# Patient Record
Sex: MALE | Race: WHITE | Employment: OTHER | URBAN - METROPOLITAN AREA
[De-identification: names, ages, dates, MRNs, and addresses within clinical notes are randomized per-mention and may not be internally consistent; named-entity substitution may affect disease eponyms.]

---

## 2019-06-18 ENCOUNTER — OFFICE VISIT (OUTPATIENT)
Dept: ORTHOPEDIC SURGERY | Age: 69
End: 2019-06-18
Payer: MEDICARE

## 2019-06-18 VITALS — HEIGHT: 74 IN | WEIGHT: 283.07 LBS | BODY MASS INDEX: 36.33 KG/M2

## 2019-06-18 DIAGNOSIS — S93.401A MILD ANKLE SPRAIN, RIGHT, INITIAL ENCOUNTER: Primary | ICD-10-CM

## 2019-06-18 DIAGNOSIS — M25.571 ACUTE RIGHT ANKLE PAIN: ICD-10-CM

## 2019-06-18 PROCEDURE — 4004F PT TOBACCO SCREEN RCVD TLK: CPT | Performed by: PODIATRIST

## 2019-06-18 PROCEDURE — G8428 CUR MEDS NOT DOCUMENT: HCPCS | Performed by: PODIATRIST

## 2019-06-18 PROCEDURE — 4040F PNEUMOC VAC/ADMIN/RCVD: CPT | Performed by: PODIATRIST

## 2019-06-18 PROCEDURE — 99203 OFFICE O/P NEW LOW 30 MIN: CPT | Performed by: PODIATRIST

## 2019-06-18 PROCEDURE — G8417 CALC BMI ABV UP PARAM F/U: HCPCS | Performed by: PODIATRIST

## 2019-06-18 PROCEDURE — 3017F COLORECTAL CA SCREEN DOC REV: CPT | Performed by: PODIATRIST

## 2019-06-18 PROCEDURE — 1123F ACP DISCUSS/DSCN MKR DOCD: CPT | Performed by: PODIATRIST

## 2019-06-18 PROCEDURE — L1902 AFO ANKLE GAUNTLET PRE OTS: HCPCS | Performed by: PODIATRIST

## 2019-06-18 RX ORDER — DOXYCYCLINE HYCLATE 100 MG
TABLET ORAL
COMMUNITY
Start: 2013-08-26 | End: 2019-07-17

## 2019-06-18 RX ORDER — MULTIVIT-MIN/IRON/FOLIC ACID/K 18-600-40
1 CAPSULE ORAL
Status: ON HOLD | COMMUNITY
Start: 2016-06-30 | End: 2022-10-27 | Stop reason: HOSPADM

## 2019-06-18 RX ORDER — CELECOXIB 200 MG/1
200 CAPSULE ORAL
COMMUNITY
Start: 2018-01-11 | End: 2022-10-26 | Stop reason: CLARIF

## 2019-06-18 NOTE — PROGRESS NOTES
HISTORY OF PRESENT ILLNESS: This is an initial visit for a 21-year-old male with a chief complaint of right lateral ankle and foot pain. He was shopping at [employer] on Saturday night when he suddenly fell. Pain is present with weightbearing  and twisting motions of the ankle. The pain is best relieved with rest, ice, and  elevation. FAMILY HISTORY:  Documented in chart. SOCIAL HISTORY: Documented in chart. REVIEW OF SYSTEMS: NIDDM with peripheral neuropathy otherwise, the patient denies any fever, chills, or night sweats. The patient also denies developing any type of rash. The patient denies any problems with cardiovascular, pulmonary, gastrointestinal, neurologic, urologic, genitourinary, psychiatric, dermatologic, and HEENT systems. PHYSICAL EXAMINATION: The area of greatest palpable tenderness is at the  right lateral ankle, over the ATF ligament. An anterior drawer test does  not reproduce any gross instability. There is mild to moderate edema of the  lateral ankle. There is no erythema or ecchymosis noted. No open lesions or fracture blisters are present. Neurovascular  status is grossly intact to the foot and ankle. There is no pain over the deltoid  ligament. There is no pain over the Achilles tendon and this is palpated to be  intact. Thompsonâs test is negative for a complete rupture of the Achilles  tendon. The patient is able to dorsiflex and plantarflex the foot, as well as  joao and invert independently. RADIOGRAPHS: Three weightbearing x-ray views of the right ankle were evaluated. No acute fractures or dislocations are noted. The ankle joint is in  excellent alignment. ASSESSMENT: Right Lateral Ankle Sprain with ankle pain. PLAN: The patient was educated on the pathology and its treatment options. An Aircast ankle brace was dispensed for the right ankle.   The patient was instructed on how to apply it correctly and will use it full-time with a supportive shoe.    I prescribed physical therapy to rehab the ankle. The patient will return after therapy. Procedures    Aircast Air Sport Ankle Brace     Patient was prescribed an Aircast Air Sport Brace. The right ankle will require stabilization / immobilization from this semi-rigid / rigid orthosis to improve their function. The orthosis will assist in protecting the affected area, provide functional support and facilitate healing. Patient was instructed to progress ambulation weight bearing as tolerated in the device. The patient was educated and fit by a healthcare professional with expert knowledge and specialization in brace application while under the direct supervision of the treating physician. Verbal and written instructions for the use of and application of this item were provided. They were instructed to contact the office immediately should the brace result in increased pain, decreased sensation, increased swelling or worsening of the condition.

## 2019-06-25 ENCOUNTER — HOSPITAL ENCOUNTER (OUTPATIENT)
Dept: PHYSICAL THERAPY | Age: 69
Setting detail: THERAPIES SERIES
Discharge: HOME | End: 2019-06-25
Payer: MEDICARE

## 2019-06-25 PROCEDURE — G0283 ELEC STIM OTHER THAN WOUND: HCPCS | Performed by: PHYSICAL THERAPIST

## 2019-06-25 PROCEDURE — 97110 THERAPEUTIC EXERCISES: CPT | Performed by: PHYSICAL THERAPIST

## 2019-06-25 PROCEDURE — 97161 PT EVAL LOW COMPLEX 20 MIN: CPT | Performed by: PHYSICAL THERAPIST

## 2019-06-25 NOTE — FLOWSHEET NOTE
activities related to improving balance, coordination, kinesthetic sense, posture, motor skill, proprioception and motor activation to allow for proper function of core, proximal hip and LE with self care and ADLs  [] (89221) Gait Re-education- Provided training and instruction to the patient for proper LE, core and proximal hip recruitment and positioning and eccentric body weight control with ambulation re-education including up and down stairs     Home Exercise Program:    [x] (67296) Reviewed/Progressed HEP activities related to strengthening, flexibility, endurance, ROM of core, proximal hip and LE for functional self-care, mobility, lifting and ambulation/stair navigation   [] (28049)Reviewed/Progressed HEP activities related to improving balance, coordination, kinesthetic sense, posture, motor skill, proprioception of core, proximal hip and LE for self care, mobility, lifting, and ambulation/stair navigation      Manual Treatments:  PROM / STM / Oscillations-Mobs:  G-I, II, III, IV (PA's, Inf., Post.)  [] (86998) Provided manual therapy to mobilize LE, proximal hip and/or LS spine soft tissue/joints for the purpose of modulating pain, promoting relaxation,  increasing ROM, reducing/eliminating soft tissue swelling/inflammation/restriction, improving soft tissue extensibility and allowing for proper ROM for normal function with self care, mobility, lifting and ambulation. Modalities:  hivolt 10Min ankle    Charges:  Timed Code Treatment Minutes: 20   Total Treatment Minutes: 50     [x] EVAL (LOW) 16585 (typically 20 minutes face-to-face)    [x] ASIA(03793) x      [] IONTO  [] NMR (02101) x      [] VASO  [] Manual (63987) x       [] Other:  [] TA x       [] Mech Traction (02828)  [] ES(attended) (23440)      [x] ES (un) (29625):     Kathrine Martinez stated goal: normal gait     Therapist goals for Patient:   Short Term Goals: To be achieved in: 2 weeks  1.  Independent in HEP and progression per patient tolerance, in order to prevent re-injury. 2. Patient will have a decrease in pain to facilitate improvement in movement, function, and ADLs as indicated by Functional Deficits.     Long Term Goals: To be achieved in: 8 weeks  1. Disability index score of 12% or less for the LEFS to assist with reaching prior level of function. 2. Patient will demonstrate increased AROM to cooper + to allow for proper joint functioning as indicated by patients Functional Deficits. 3. Patient will demonstrate an increase in Strength to good proximal hip strength and control, within 5/5 MMT in LE to allow for proper functional mobility as indicated by patients Functional Deficits. 4. Patient will return to full functional activities without increased symptoms or restriction. 5. Normal gait comm(patient specific functional goal)    6. recip gait up/down steps 1 flight            Progression Towards Functional goals:  [] Patient is progressing as expected towards functional goals listed. [] Progression is slowed due to complexities listed. [] Progression has been slowed due to co-morbidities.   [x] Plan just implemented, too soon to assess goals progression  [] Other:     ASSESSMENT:  See eval    Treatment/Activity Tolerance:  [x] Patient tolerated treatment well [] Patient limited by fatique  [] Patient limited by pain  [] Patient limited by other medical complications  [] Other:     Prognosis: [x] Good [] Fair  [] Poor    Patient Requires Follow-up: [x] Yes  [] No    PLAN: See eval  [] Continue per plan of care [] Alter current plan (see comments)  [x] Plan of care initiated [] Hold pending MD visit [] Discharge    Electronically signed by: Tab Villarreal PT

## 2019-06-25 NOTE — PLAN OF CARE
[facility], [address] [facility], [address] [facility] [address]  Phone: [phone]   Fax:     [phone]       Physical Therapy Certification    Dear  Dr Axel Goss,    We had the pleasure of evaluating the following patient for physical therapy services at [address] [place] [address]. A summary of our findings can be found in the initial assessment below. This includes our plan of care. If you have any questions or concerns regarding these findings, please do not hesitate to contact me at the office phone number checked above. Thank you for the referral.       Physician Signature:_______________________________Date:__________________  By signing above (or electronic signature), therapists plan is approved by physician      Patient: Pattie Hernandez   : 1950   MRN: [id number]  Referring Physician: Axel Goss      Evaluation Date: 2019      Medical Diagnosis Information:   S93.401D R ankle sprain     M25.571 R ankle pain                                        Insurance information:   [facility]     Precautions/ Contra-indications: stroke 20 yrs ago no long term problems, has a balance problem he is being seen for at [address] [place] [address] Road:  [x]NO      []YES  Preferred Language for Healthcare:   [x]English       []other:    SUBJECTIVE: Patient stated complaint:  Week ago  fell while walking not sure how or why. XRAY neg for fx.  Marked time on steps    Relevant Medical History:as a kid had hurt ankle  Functional Disability Index: lefs 25%    Pain Scale: 3/10  Easing factors: rest elevation  Provocative factors: standing prolonged walking prolonged      Type: []Constant   [x]Intermittent  []Radiating []Localized []other:     Numbness/Tingling: neuropathies fr diabetes    Occupation/School:retired, volunteer sitting and with [employer]    Living Status/Prior Level of Function: Independent with ADLs and IADLs,     OBJECTIVE:     AROM LEFT RIGHT   HIP Flex     HIP Abd     HIP Ext     HIP IR     HIP ER     Knee ext     Knee Flex     Ankle PF  40   Ankle DF  Jay Jay/sol  -4 fr 0/13   Ankle In  32   Ankle Ev  3   Strength  LEFT RIGHT   HIP Flexors     HIP Abductors     HIP Ext     Hip ER     Knee EXT (quad)     Knee Flex (HS)     Ankle DF  5/5   Ankle PF     Ankle Inv  5/5   Ankle EV  4/5        Circumference  Mid apex  7 cm prox             Reflexes/Sensation:    [x]Dermatomes/Myotomes intact    [x]Reflexes equal and normal bilaterally   []Other:    Joint mobility:    []Normal    []Hypo   []Hyper    Palpation: red area over lat mall that is hyper sensitive to touch, + swelling    Functional Mobility/Transfers: independent    Posture: good    Bandages/Dressings/Incisions: na    Gait: (include devices/WB status) antalgic has a brace he wears all the time per MD    Orthopedic Special Tests:                        [x] Patient history, allergies, meds reviewed. Medical chart reviewed. See intake form. Review Of Systems (ROS):  [x]Performed Review of systems (Integumentary, CardioPulmonary, Neurological) by intake and observation. Intake form has been scanned into medical record. Patient has been instructed to contact their primary care physician regarding ROS issues if not already being addressed at this time.       Co-morbidities/Complexities (which will affect course of rehabilitation):   [x]None           Arthritic conditions   []Rheumatoid arthritis (M05.9)  []Osteoarthritis (M19.91)   Cardiovascular conditions   []Hypertension (I10)  []Hyperlipidemia (E78.5)  []Angina pectoris (I20)  []Atherosclerosis (I70)   Musculoskeletal conditions   []Disc pathology   []Congenital spine pathologies   []Prior surgical intervention  []Osteoporosis (M81.8)  []Osteopenia (M85.8)   Endocrine conditions   []Hypothyroid (E03.9)  []Hyperthyroid Gastrointestinal conditions   []Constipation (P42.49)   Metabolic [] high complexity using standardized patient assessment instrument and/or measurable assessment of functional outcome. [x] EVAL (LOW) 04908 (typically 20 minutes face-to-face)  [] EVAL (MOD) 45497 (typically 30 minutes face-to-face)  [] EVAL (HIGH) 88038 (typically 45 minutes face-to-face)  [] RE-EVAL     PLAN:   Frequency/Duration:  2-3 days per week for 8 Weeks:  Interventions:  [x]  Therapeutic exercise including: strength training, ROM, for Lower extremity and core   [x]  NMR activation and proprioception for LE, Glutes and Core   [x]  Manual therapy as indicated for LE, Hip and spine to include: Dry Needling/IASTM, STM, PROM, Gr I-IV mobilizations, manipulation. [x] Modalities as needed that may include: thermal agents, E-stim, Biofeedback, US, iontophoresis as indicated  [x] Patient education on joint protection, postural re-education, activity modification, progression of HEP. HEP instruction: (see scanned forms)    GOALS:  Patient stated goal: normal gait    Therapist goals for Patient:   Short Term Goals: To be achieved in: 2 weeks  1. Independent in HEP and progression per patient tolerance, in order to prevent re-injury. 2. Patient will have a decrease in pain to facilitate improvement in movement, function, and ADLs as indicated by Functional Deficits. Long Term Goals: To be achieved in: 8 weeks  1. Disability index score of 12% or less for the LEFS to assist with reaching prior level of function. 2. Patient will demonstrate increased AROM to cooper + to allow for proper joint functioning as indicated by patients Functional Deficits. 3. Patient will demonstrate an increase in Strength to good proximal hip strength and control, within 5/5 MMT in LE to allow for proper functional mobility as indicated by patients Functional Deficits. 4. Patient will return to full functional activities without increased symptoms or restriction. 5. Normal gait comm(patient specific functional goal)    6. recip gait up/down steps 1 flight     Electronically signed by:  Catherine Rosas, PT

## 2019-06-27 ENCOUNTER — APPOINTMENT (OUTPATIENT)
Dept: PHYSICAL THERAPY | Age: 69
End: 2019-06-27
Payer: MEDICARE

## 2019-07-01 ENCOUNTER — HOSPITAL ENCOUNTER (OUTPATIENT)
Dept: PHYSICAL THERAPY | Age: 69
Setting detail: THERAPIES SERIES
Discharge: HOME | End: 2019-07-01
Payer: MEDICARE

## 2019-07-01 PROCEDURE — G0283 ELEC STIM OTHER THAN WOUND: HCPCS | Performed by: PHYSICAL THERAPIST

## 2019-07-01 PROCEDURE — 97112 NEUROMUSCULAR REEDUCATION: CPT | Performed by: PHYSICAL THERAPIST

## 2019-07-01 PROCEDURE — 97110 THERAPEUTIC EXERCISES: CPT | Performed by: PHYSICAL THERAPIST

## 2019-07-01 NOTE — FLOWSHEET NOTE
[facility]    Physical Therapy Daily Treatment Note  Date:  2019    Patient Name:  Karl Villegas 26  :  1950  MRN: [id number]  Restrictions/Precautions:    Medical/Treatment Diagnosis Information:    S93.401D R ankle sprain           M25.571 R ankle pain        ·      Insurance/Certification information:   [facility]  Physician Information: [facility] signed (Y/N):     Date of Patient follow up with Physician:     G-Code (if applicable):      Date G-Code Applied:     lefs 25%    Progress Note: [x]  Yes  []  No  Next due by: Visit #10       Latex Allergy:  [x]NO      []YES  Preferred Language for Healthcare:   [x]English       []other:    Visit # Insurance Allowable   2 [facility]     Pain level:  3/10     SUBJECTIVE:  Feeling much better with pain and function. OBJECTIVE: See eval  Observation:   Test measurements:      RESTRICTIONS/PRECAUTIONS: none    Exercises/Interventions:     Therapeutic Ex Sets/sec Reps Notes HEP   SLR  Standing hip ABD  TB ankle 4 way    Belt stretch G/S  HR  SLB  Standing calf stretch  Ankle iso ev/inv     limegreen x30  x30  x30 ea      x30  5 min  3x30s  10x5s     Max cue.  Unable to move foot only          Table leg, siting X  X  X      X  X  X  x          Bike   5 min            Pt ed on monitoring red spots from brace to keep sores from forming 5 min  Lat mal           equipment  next                                                                    Manual Intervention                                Therapeutic Exercise and NMR EXR  [x] (79057) Provided verbal/tactile cueing for activities related to strengthening, flexibility, endurance, ROM for improvements in LE, proximal hip, and core control with self care, mobility, lifting, ambulation.  [] (39660) Provided verbal/tactile cueing for activities related to improving balance, coordination, kinesthetic sense, posture, motor skill, proprioception  to assist with LE, proximal hip, and core control in self care, mobility, lifting, ambulation and eccentric single leg control. NMR and Therapeutic Activities:    [] (70865 or 29018) Provided verbal/tactile cueing for activities related to improving balance, coordination, kinesthetic sense, posture, motor skill, proprioception and motor activation to allow for proper function of core, proximal hip and LE with self care and ADLs  [] (11599) Gait Re-education- Provided training and instruction to the patient for proper LE, core and proximal hip recruitment and positioning and eccentric body weight control with ambulation re-education including up and down stairs     Home Exercise Program:    [x] (75868) Reviewed/Progressed HEP activities related to strengthening, flexibility, endurance, ROM of core, proximal hip and LE for functional self-care, mobility, lifting and ambulation/stair navigation   [] (50095)Reviewed/Progressed HEP activities related to improving balance, coordination, kinesthetic sense, posture, motor skill, proprioception of core, proximal hip and LE for self care, mobility, lifting, and ambulation/stair navigation      Manual Treatments:  PROM / STM / Oscillations-Mobs:  G-I, II, III, IV (PA's, Inf., Post.)  [] (47733) Provided manual therapy to mobilize LE, proximal hip and/or LS spine soft tissue/joints for the purpose of modulating pain, promoting relaxation,  increasing ROM, reducing/eliminating soft tissue swelling/inflammation/restriction, improving soft tissue extensibility and allowing for proper ROM for normal function with self care, mobility, lifting and ambulation.      Modalities:  hivolt 10Min ankle    Charges:  Timed Code Treatment Minutes: 40   Total Treatment Minutes: 50     [] EVAL (LOW) 32595 (typically 20 minutes face-to-face)    [x] XH(30682) x  2   [] IONTO  [x] NMR (39456) x      [] VASO  [] Manual (88171) x       [] Other:  [] TA x       [] Mech Traction (47718)  [] ES(attended)

## 2019-07-03 ENCOUNTER — HOSPITAL ENCOUNTER (OUTPATIENT)
Dept: PHYSICAL THERAPY | Age: 69
Setting detail: THERAPIES SERIES
Discharge: HOME | End: 2019-07-03
Payer: MEDICARE

## 2019-07-03 PROCEDURE — 97110 THERAPEUTIC EXERCISES: CPT | Performed by: PHYSICAL THERAPIST

## 2019-07-03 PROCEDURE — 97112 NEUROMUSCULAR REEDUCATION: CPT | Performed by: PHYSICAL THERAPIST

## 2019-07-03 PROCEDURE — G0283 ELEC STIM OTHER THAN WOUND: HCPCS | Performed by: PHYSICAL THERAPIST

## 2019-07-09 ENCOUNTER — HOSPITAL ENCOUNTER (OUTPATIENT)
Dept: PHYSICAL THERAPY | Age: 69
Setting detail: THERAPIES SERIES
Discharge: HOME | End: 2019-07-09
Payer: MEDICARE

## 2019-07-09 PROCEDURE — 97140 MANUAL THERAPY 1/> REGIONS: CPT | Performed by: PHYSICAL THERAPIST

## 2019-07-09 PROCEDURE — G0283 ELEC STIM OTHER THAN WOUND: HCPCS | Performed by: PHYSICAL THERAPIST

## 2019-07-09 PROCEDURE — 97110 THERAPEUTIC EXERCISES: CPT | Performed by: PHYSICAL THERAPIST

## 2019-07-09 NOTE — FLOWSHEET NOTE
kinesthetic sense, posture, motor skill, proprioception  to assist with LE, proximal hip, and core control in self care, mobility, lifting, ambulation and eccentric single leg control. NMR and Therapeutic Activities:    [] (41474 or 79252) Provided verbal/tactile cueing for activities related to improving balance, coordination, kinesthetic sense, posture, motor skill, proprioception and motor activation to allow for proper function of core, proximal hip and LE with self care and ADLs  [] (09106) Gait Re-education- Provided training and instruction to the patient for proper LE, core and proximal hip recruitment and positioning and eccentric body weight control with ambulation re-education including up and down stairs     Home Exercise Program:    [x] (86330) Reviewed/Progressed HEP activities related to strengthening, flexibility, endurance, ROM of core, proximal hip and LE for functional self-care, mobility, lifting and ambulation/stair navigation   [] (95940)Reviewed/Progressed HEP activities related to improving balance, coordination, kinesthetic sense, posture, motor skill, proprioception of core, proximal hip and LE for self care, mobility, lifting, and ambulation/stair navigation      Manual Treatments:  PROM / STM / Oscillations-Mobs:  G-I, II, III, IV (PA's, Inf., Post.)  [] (01638) Provided manual therapy to mobilize LE, proximal hip and/or LS spine soft tissue/joints for the purpose of modulating pain, promoting relaxation,  increasing ROM, reducing/eliminating soft tissue swelling/inflammation/restriction, improving soft tissue extensibility and allowing for proper ROM for normal function with self care, mobility, lifting and ambulation.      Modalities:  hivolt 10Min ankle    Charges:  Timed Code Treatment Minutes: 40   Total Treatment Minutes: 50     [] EVAL (LOW) 10013 (typically 20 minutes face-to-face)    [x] BF(75848) x  2   [] IONTO  [x] NMR (30793) x      [] VASO  [] Manual (81602) x       []

## 2019-07-16 ENCOUNTER — HOSPITAL ENCOUNTER (OUTPATIENT)
Dept: PHYSICAL THERAPY | Age: 69
Setting detail: THERAPIES SERIES
Discharge: HOME | End: 2019-07-16
Payer: MEDICARE

## 2019-07-16 PROCEDURE — 97112 NEUROMUSCULAR REEDUCATION: CPT | Performed by: PHYSICAL THERAPIST

## 2019-07-16 PROCEDURE — G0283 ELEC STIM OTHER THAN WOUND: HCPCS | Performed by: PHYSICAL THERAPIST

## 2019-07-16 PROCEDURE — 97110 THERAPEUTIC EXERCISES: CPT | Performed by: PHYSICAL THERAPIST

## 2019-07-16 NOTE — FLOWSHEET NOTE
[facility]    Physical Therapy Daily Treatment Note  Date:  2019    Patient Name:  Mouna SMITH ANDI [facility]  :  1950  MRN: [id number]  Restrictions/Precautions:    Medical/Treatment Diagnosis Information:    S93.401D R ankle sprain           M25.571 R ankle pain        ·      Insurance/Certification information:   [facility]  Physician Information: [facility] signed (Y/N):     Date of Patient follow up with Physician:     G-Code (if applicable):      Date G-Code Applied:     lefs 25%    Progress Note: [x]  Yes  []  No  Next due by: Visit #10       Latex Allergy:  [x]NO      []YES  Preferred Language for Healthcare:   [x]English       []other:    Visit # Insurance Allowable   5 [facility]     Pain level:  3/10     SUBJECTIVE: ankle doing better    OBJECTIVE:   recip gait up steps with handrails  BUE  Observation:   Test measurements:  AROM +5 DF,  MMT EV 4+/5    RESTRICTIONS/PRECAUTIONS: none    Exercises/Interventions:     Therapeutic Ex Sets/sec Reps Notes HEP   SLR  Standing hip ABD  TB ankle 4 way    Belt stretch G/S  HR  SLB  Standing calf stretch  Ankle iso ev/inv  LAQ  HS curls   30 L  limegreen   x30  x30 5# R      x30  10x5s  3x30s  10x5s  x30 5#  x30 brown     Max cue.  Unable to move foot only          Table leg, siting X  X  X      X  X  X  x          Bike   5 min            Pt ed on monitoring red spots from brace to keep sores from forming 5 min  Lat mal           equipment  next                                                                    Manual Intervention                                Therapeutic Exercise and NMR EXR  [x] (18175) Provided verbal/tactile cueing for activities related to strengthening, flexibility, endurance, ROM for improvements in LE, proximal hip, and core control with self care, mobility, lifting, ambulation.  [] (54182) Provided verbal/tactile cueing for activities related to improving balance, coordination, Other:  [] TA x       [] [facility] Traction (56898)  [] ES(attended) (76773)      [x] ES (un) (09963):     Xuan Vides stated goal: normal gait     Therapist goals for Patient:   Short Term Goals: To be achieved in: 2 weeks  1. Independent in HEP and progression per patient tolerance, in order to prevent re-injury. 2. Patient will have a decrease in pain to facilitate improvement in movement, function, and ADLs as indicated by Functional Deficits.     Long Term Goals: To be achieved in: 8 weeks  1. Disability index score of 12% or less for the LEFS to assist with reaching prior level of function. 2. Patient will demonstrate increased AROM to cooper + to allow for proper joint functioning as indicated by patients Functional Deficits. 3. Patient will demonstrate an increase in Strength to good proximal hip strength and control, within 5/5 MMT in LE to allow for proper functional mobility as indicated by patients Functional Deficits. 4. Patient will return to full functional activities without increased symptoms or restriction. 5. Normal gait comm(patient specific functional goal)    6. recip gait up/down steps 1 flight            Progression Towards Functional goals:  [x] Patient is progressing as expected towards functional goals listed. [] Progression is slowed due to complexities listed. [] Progression has been slowed due to co-morbidities. [] Plan just implemented, too soon to assess goals progression  [] Other:     ASSESSMENT:  husam increased wts and reps today.  Progressing with increased strenght and ROM     Treatment/Activity Tolerance:  [x] Patient tolerated treatment well [] Patient limited by fatique  [] Patient limited by pain  [] Patient limited by other medical complications  [] Other:     Prognosis: [x] Good [] Fair  [] Poor    Patient Requires Follow-up: [x] Yes  [] No    PLAN:   [x] Continue per plan of care [] Alter current plan (see comments)  [] Plan of care initiated [] Hold pending MD visit [] Discharge    Electronically signed by: Tanya Youssef PT

## 2019-07-17 ENCOUNTER — OFFICE VISIT (OUTPATIENT)
Dept: ORTHOPEDIC SURGERY | Age: 69
End: 2019-07-17
Payer: MEDICARE

## 2019-07-17 VITALS
SYSTOLIC BLOOD PRESSURE: 126 MMHG | DIASTOLIC BLOOD PRESSURE: 60 MMHG | HEART RATE: 67 BPM | HEIGHT: 74 IN | BODY MASS INDEX: 36.33 KG/M2 | WEIGHT: 283.07 LBS

## 2019-07-17 DIAGNOSIS — S93.401A MILD ANKLE SPRAIN, RIGHT, INITIAL ENCOUNTER: Primary | ICD-10-CM

## 2019-07-17 DIAGNOSIS — M25.571 ACUTE RIGHT ANKLE PAIN: ICD-10-CM

## 2019-07-17 PROCEDURE — G8427 DOCREV CUR MEDS BY ELIG CLIN: HCPCS | Performed by: PODIATRIST

## 2019-07-17 PROCEDURE — G8417 CALC BMI ABV UP PARAM F/U: HCPCS | Performed by: PODIATRIST

## 2019-07-17 PROCEDURE — 1036F TOBACCO NON-USER: CPT | Performed by: PODIATRIST

## 2019-07-17 PROCEDURE — 99213 OFFICE O/P EST LOW 20 MIN: CPT | Performed by: PODIATRIST

## 2019-07-17 PROCEDURE — 1123F ACP DISCUSS/DSCN MKR DOCD: CPT | Performed by: PODIATRIST

## 2019-07-17 PROCEDURE — 3017F COLORECTAL CA SCREEN DOC REV: CPT | Performed by: PODIATRIST

## 2019-07-17 PROCEDURE — 4040F PNEUMOC VAC/ADMIN/RCVD: CPT | Performed by: PODIATRIST

## 2019-07-17 RX ORDER — DOXYCYCLINE HYCLATE 100 MG
TABLET ORAL
COMMUNITY
Start: 2013-08-26 | End: 2022-10-26 | Stop reason: CLARIF

## 2019-07-24 ENCOUNTER — HOSPITAL ENCOUNTER (OUTPATIENT)
Dept: PHYSICAL THERAPY | Age: 69
Setting detail: THERAPIES SERIES
Discharge: HOME | End: 2019-07-24
Payer: MEDICARE

## 2019-07-24 PROCEDURE — 97110 THERAPEUTIC EXERCISES: CPT | Performed by: PHYSICAL THERAPIST

## 2019-07-24 PROCEDURE — G0283 ELEC STIM OTHER THAN WOUND: HCPCS | Performed by: PHYSICAL THERAPIST

## 2019-07-24 NOTE — FLOWSHEET NOTE
cueing for activities related to improving balance, coordination, kinesthetic sense, posture, motor skill, proprioception  to assist with LE, proximal hip, and core control in self care, mobility, lifting, ambulation and eccentric single leg control. NMR and Therapeutic Activities:    [] (88150 or 63023) Provided verbal/tactile cueing for activities related to improving balance, coordination, kinesthetic sense, posture, motor skill, proprioception and motor activation to allow for proper function of core, proximal hip and LE with self care and ADLs  [] (98786) Gait Re-education- Provided training and instruction to the patient for proper LE, core and proximal hip recruitment and positioning and eccentric body weight control with ambulation re-education including up and down stairs     Home Exercise Program:    [x] (91455) Reviewed/Progressed HEP activities related to strengthening, flexibility, endurance, ROM of core, proximal hip and LE for functional self-care, mobility, lifting and ambulation/stair navigation   [] (31308)Reviewed/Progressed HEP activities related to improving balance, coordination, kinesthetic sense, posture, motor skill, proprioception of core, proximal hip and LE for self care, mobility, lifting, and ambulation/stair navigation      Manual Treatments:  PROM / STM / Oscillations-Mobs:  G-I, II, III, IV (PA's, Inf., Post.)  [] (81088) Provided manual therapy to mobilize LE, proximal hip and/or LS spine soft tissue/joints for the purpose of modulating pain, promoting relaxation,  increasing ROM, reducing/eliminating soft tissue swelling/inflammation/restriction, improving soft tissue extensibility and allowing for proper ROM for normal function with self care, mobility, lifting and ambulation.      Modalities:  hivolt 10Min ankle    Charges:  Timed Code Treatment Minutes: 30   Total Treatment Minutes: 40     [] EVAL (LOW) 61095 (typically 20 minutes face-to-face)    [x] XP(90819) x  2   []

## 2019-07-29 ENCOUNTER — HOSPITAL ENCOUNTER (OUTPATIENT)
Dept: PHYSICAL THERAPY | Age: 69
Setting detail: THERAPIES SERIES
Discharge: HOME | End: 2019-07-29
Payer: MEDICARE

## 2019-07-29 PROCEDURE — 97112 NEUROMUSCULAR REEDUCATION: CPT | Performed by: PHYSICAL THERAPIST

## 2019-07-29 PROCEDURE — G0283 ELEC STIM OTHER THAN WOUND: HCPCS | Performed by: PHYSICAL THERAPIST

## 2019-07-29 PROCEDURE — 97110 THERAPEUTIC EXERCISES: CPT | Performed by: PHYSICAL THERAPIST

## 2019-07-29 NOTE — FLOWSHEET NOTE
[facility]    Physical Therapy Daily Treatment Note  Date:  2019    Patient Name:  Brigida SMITH ANDI [facility]  :  1950  MRN: [id number]  Restrictions/Precautions:    Medical/Treatment Diagnosis Information:    S93.401D R ankle sprain           M25.571 R ankle pain        ·      Insurance/Certification information:   [facility]  Physician Information: [address] [facility] signed (Y/N):     Date of Patient follow up with Physician:     G-Code (if applicable):      Date G-Code Applied:     lefs 25%    Progress Note: [x]  Yes  []  No  Next due by: Visit #10  MD 19     Latex Allergy:  [x]NO      []YES  Preferred Language for Healthcare:   [x]English       []other:    Visit # Insurance Allowable   7 MC     Pain level:  3/10     SUBJECTIVE: did a lot of walking and it is sore. Over all better    OBJECTIVE:   recip gait up steps with handrails  BUE   Observation:   Test measurements:  AROM +5 DF,  MMT EV 4+/5    RESTRICTIONS/PRECAUTIONS: none    Exercises/Interventions:     Therapeutic Ex Sets/sec Reps Notes HEP   SLR  Standing hip ABD  Standing hip ext  TB ankle 4 way      Belt stretch G/S  HR  SLB  Standing calf stretch  Ankle iso ev/inv  LAQ  HS curls   30 L  30 L  limegreen   x30 R/L  x30 5# R  x30 R 5#        x30  10x5s  3x30s  10x5s  x30 5#  x30 brown       Max cue.  Unable to move foot only          Table leg, siting X  X  X      X  X  X  x   Bridges  crunches  x30  x30     Bike   5 min            Pt ed on monitoring red spots from brace to keep sores from forming 5 min  Lat mal           equipment  next            Pt ed on breaking in new shoes  8 min                                                      Manual Intervention                                Therapeutic Exercise and NMR EXR  [x] (31014) Provided verbal/tactile cueing for activities related to strengthening, flexibility, endurance, ROM for improvements in LE, proximal hip, and core control with self care, mobility, lifting, ambulation.  [] (57245) Provided verbal/tactile cueing for activities related to improving balance, coordination, kinesthetic sense, posture, motor skill, proprioception  to assist with LE, proximal hip, and core control in self care, mobility, lifting, ambulation and eccentric single leg control. NMR and Therapeutic Activities:    [] (80841 or 83804) Provided verbal/tactile cueing for activities related to improving balance, coordination, kinesthetic sense, posture, motor skill, proprioception and motor activation to allow for proper function of core, proximal hip and LE with self care and ADLs  [] (05138) Gait Re-education- Provided training and instruction to the patient for proper LE, core and proximal hip recruitment and positioning and eccentric body weight control with ambulation re-education including up and down stairs     Home Exercise Program:    [x] (73926) Reviewed/Progressed HEP activities related to strengthening, flexibility, endurance, ROM of core, proximal hip and LE for functional self-care, mobility, lifting and ambulation/stair navigation   [] (70475)Reviewed/Progressed HEP activities related to improving balance, coordination, kinesthetic sense, posture, motor skill, proprioception of core, proximal hip and LE for self care, mobility, lifting, and ambulation/stair navigation      Manual Treatments:  PROM / STM / Oscillations-Mobs:  G-I, II, III, IV (PA's, Inf., Post.)  [] (46811) Provided manual therapy to mobilize LE, proximal hip and/or LS spine soft tissue/joints for the purpose of modulating pain, promoting relaxation,  increasing ROM, reducing/eliminating soft tissue swelling/inflammation/restriction, improving soft tissue extensibility and allowing for proper ROM for normal function with self care, mobility, lifting and ambulation.      Modalities:  hivolt 10Min ankle    Charges:  Timed Code Treatment Minutes: 40   Total Treatment Minutes: 50     [] Requires Follow-up: [x] Yes  [] No    PLAN:   [x] Continue per plan of care [] Alter current plan (see comments)  [] Plan of care initiated [] Hold pending MD visit [] Discharge    Electronically signed by: Marnie Villalba PT

## 2019-08-01 ENCOUNTER — HOSPITAL ENCOUNTER (OUTPATIENT)
Dept: PHYSICAL THERAPY | Age: 69
Setting detail: THERAPIES SERIES
Discharge: HOME | End: 2019-08-01
Payer: MEDICARE

## 2019-08-01 PROCEDURE — 97110 THERAPEUTIC EXERCISES: CPT | Performed by: PHYSICAL THERAPIST

## 2019-08-01 PROCEDURE — 97112 NEUROMUSCULAR REEDUCATION: CPT | Performed by: PHYSICAL THERAPIST

## 2019-08-01 PROCEDURE — G0283 ELEC STIM OTHER THAN WOUND: HCPCS | Performed by: PHYSICAL THERAPIST

## 2019-08-01 NOTE — FLOWSHEET NOTE
Treatment Minutes: 50     [] EVAL (LOW) 37938 (typically 20 minutes face-to-face)    [x] AU(35810) x  2   [] IONTO  [x] NMR (16232) x      [] VASO  [] Manual (62093) x       [] Other:  [] TA x       [] Mech Traction (95067)  [] ES(attended) (18546)      [x] ES (un) (25193):     Shayy Franco stated goal: normal gait     Therapist goals for Patient:   Short Term Goals: To be achieved in: 2 weeks  1. Independent in HEP and progression per patient tolerance, in order to prevent re-injury. 2. Patient will have a decrease in pain to facilitate improvement in movement, function, and ADLs as indicated by Functional Deficits.     Long Term Goals: To be achieved in: 8 weeks  1. Disability index score of 12% or less for the LEFS to assist with reaching prior level of function. 2. Patient will demonstrate increased AROM to cooper + to allow for proper joint functioning as indicated by patients Functional Deficits. 3. Patient will demonstrate an increase in Strength to good proximal hip strength and control, within 5/5 MMT in LE to allow for proper functional mobility as indicated by patients Functional Deficits. 4. Patient will return to full functional activities without increased symptoms or restriction. 5. Normal gait comm(patient specific functional goal)    6. recip gait up/down steps 1 flight            Progression Towards Functional goals:  [x] Patient is progressing as expected towards functional goals listed. [] Progression is slowed due to complexities listed. [] Progression has been slowed due to co-morbidities.   [] Plan just implemented, too soon to assess goals progression  [] Other:     ASSESSMENT:  husam new ex without increased pain     Treatment/Activity Tolerance:  [x] Patient tolerated treatment well [] Patient limited by fatique  [] Patient limited by pain  [] Patient limited by other medical complications  [] Other:     Prognosis: [x] Good [] Fair  [] Poor    Patient Requires Follow-up: [x]

## 2019-08-05 ENCOUNTER — HOSPITAL ENCOUNTER (OUTPATIENT)
Dept: PHYSICAL THERAPY | Age: 69
Setting detail: THERAPIES SERIES
Discharge: HOME | End: 2019-08-05
Payer: MEDICARE

## 2019-08-05 PROCEDURE — 97110 THERAPEUTIC EXERCISES: CPT | Performed by: PHYSICAL THERAPIST

## 2019-08-05 NOTE — FLOWSHEET NOTE
Ladders     Square     Jump/Hop  Low                        Med.                        High               Modality     PTA Assessment Added a step up and a few machines today. Good exercise tolerance. Good exercise pace and technique.     Time Based Treatment 15 minutes 25 minutes     Electronically signed by: Harvinder Rodriguez PTA, ATC
Minutes: 30     [] EVAL (LOW) 79443 (typically 20 minutes face-to-face)    [x] NR(84239) x  2   [] IONTO  [] NMR (03446) x      [] VASO  [] Manual (02491) x       [] Other:  [] TA x       [] Mech Traction (69062)  [] ES(attended) (93225)      [] ES (un) (82170):     Dai Fuentes stated goal: normal gait     Therapist goals for Patient:   Short Term Goals: To be achieved in: 2 weeks  1. Independent in HEP and progression per patient tolerance, in order to prevent re-injury. 2. Patient will have a decrease in pain to facilitate improvement in movement, function, and ADLs as indicated by Functional Deficits.     Long Term Goals: To be achieved in: 8 weeks  1. Disability index score of 12% or less for the LEFS to assist with reaching prior level of function. 2. Patient will demonstrate increased AROM to cooper + to allow for proper joint functioning as indicated by patients Functional Deficits. 3. Patient will demonstrate an increase in Strength to good proximal hip strength and control, within 5/5 MMT in LE to allow for proper functional mobility as indicated by patients Functional Deficits. 4. Patient will return to full functional activities without increased symptoms or restriction. 5. Normal gait comm(patient specific functional goal)    6. recip gait up/down steps 1 flight            Progression Towards Functional goals:  [x] Patient is progressing as expected towards functional goals listed. [] Progression is slowed due to complexities listed. [] Progression has been slowed due to co-morbidities.   [] Plan just implemented, too soon to assess goals progression  [] Other:     ASSESSMENT:  husam new ex without increased pain     Treatment/Activity Tolerance:  [x] Patient tolerated treatment well [] Patient limited by fatique  [] Patient limited by pain  [] Patient limited by other medical complications  [] Other:     Prognosis: [x] Good [] Fair  [] Poor    Patient Requires Follow-up: [x] Yes  []

## 2019-08-08 ENCOUNTER — HOSPITAL ENCOUNTER (OUTPATIENT)
Dept: PHYSICAL THERAPY | Age: 69
Setting detail: THERAPIES SERIES
Discharge: HOME | End: 2019-08-08
Payer: MEDICARE

## 2019-08-08 PROCEDURE — G0283 ELEC STIM OTHER THAN WOUND: HCPCS | Performed by: PHYSICAL THERAPIST

## 2019-08-08 PROCEDURE — 97110 THERAPEUTIC EXERCISES: CPT | Performed by: PHYSICAL THERAPIST

## 2019-08-08 NOTE — FLOWSHEET NOTE
Physical Therapist Assistant Activity Sheet    Date:  2019    Patient Name:  Lucia Spatz    :  1950  MRN: [id number]  Restrictions/Precautions:    Medical/Treatment Diagnosis Information:     S93.401D R ankle sprain         ·   T03.726 R ankle pain         Physician Information:       Weeks Post-op  8 wks  12 wks 16 wks 20 wks   24 wks                            Activities                                                DOS/DOI:                                                    Date: 19   Bike  6' 6'   Elliptical      Treadmill      Airdyne            Gastroc stretch      Soleus stretch      Hamstring stretch      ITB stretch      Hip Flexor stretch      Quad stretch      Adductor stretch            Weight Shifting sp                                fp                                tp      Lateral walking (with/w/o TB)            Balance: PEP/Isa board                     SLS            Star excursion load/explode            Extremity reach UE/LE            Leg Press Moises. 65# x 30 65# x 30 65# x 30                     Ecc.                        Inv. Calf Press Moises. Ecc.                        Inv.            ADA   Flex                 ABd R/L 30# x 20 ea R/L 30# x 30 ea R/L 35# x 30 ea              ADd                 TKE                 Ext R/L 30# x 20 ea R/L 30# x 30 ea R/L 35# x 30 ea         Steps  Up 6\" x 20 6\" x 30 6\" x 30               Up and Over                  Down                  Lateral                  Rotation            Squats:  Mini                     Wall                     BOSU            Lunges:  Lunge to Balance                     Balance to Lunge                     Walking            Knee Extension Bilat. 25# x 30 25# x 30 25# x 30                              Ecc.                                 Inv. Hamstring Curls Bilat.  35# x 30 35# x 30 40# x 30                               Ecc.

## 2019-08-08 NOTE — FLOWSHEET NOTE
[x] (38463) Reviewed/Progressed HEP activities related to strengthening, flexibility, endurance, ROM of core, proximal hip and LE for functional self-care, mobility, lifting and ambulation/stair navigation   [] (36140)Reviewed/Progressed HEP activities related to improving balance, coordination, kinesthetic sense, posture, motor skill, proprioception of core, proximal hip and LE for self care, mobility, lifting, and ambulation/stair navigation      Manual Treatments:  PROM / STM / Oscillations-Mobs:  G-I, II, III, IV (PA's, Inf., Post.)  [] (12998) Provided manual therapy to mobilize LE, proximal hip and/or LS spine soft tissue/joints for the purpose of modulating pain, promoting relaxation,  increasing ROM, reducing/eliminating soft tissue swelling/inflammation/restriction, improving soft tissue extensibility and allowing for proper ROM for normal function with self care, mobility, lifting and ambulation. Modalities:  hivolt 10Min ankle    Charges:  Timed Code Treatment Minutes: 30   Total Treatment Minutes: 30     [] EVAL (LOW) 33176 (typically 20 minutes face-to-face)    [x] WA(69071) x  2   [] IONTO  [] NMR (16112) x      [] VASO  [] Manual (34847) x       [] Other:  [] TA x       [] Mech Traction (72955)  [] ES(attended) (89531)      [] ES (un) (30337):     Donna Hill stated goal: normal gait     Therapist goals for Patient:   Short Term Goals: To be achieved in: 2 weeks  1. Independent in HEP and progression per patient tolerance, in order to prevent re-injury. 2. Patient will have a decrease in pain to facilitate improvement in movement, function, and ADLs as indicated by Functional Deficits.     Long Term Goals: To be achieved in: 8 weeks  1. Disability index score of 12% or less for the LEFS to assist with reaching prior level of function. 2. Patient will demonstrate increased AROM to cooper + to allow for proper joint functioning as indicated by patients Functional Deficits.    3. Patient will demonstrate an increase in Strength to good proximal hip strength and control, within 5/5 MMT in LE to allow for proper functional mobility as indicated by patients Functional Deficits. 4. Patient will return to full functional activities without increased symptoms or restriction. 5. Normal gait comm(patient specific functional goal)    6. recip gait up/down steps 1 flight          Progression Towards Functional goals:  [x] Patient is progressing as expected towards functional goals listed. [] Progression is slowed due to complexities listed. [] Progression has been slowed due to co-morbidities. [] Plan just implemented, too soon to assess goals progression  [] Other:     ASSESSMENT: Progressing strength. See other flowsheet for exercises.     Treatment/Activity Tolerance:  [x] Patient tolerated treatment well [] Patient limited by fatique  [] Patient limited by pain  [] Patient limited by other medical complications  [] Other:     Prognosis: [x] Good [] Fair  [] Poor    Patient Requires Follow-up: [x] Yes  [] No    PLAN:   [x] Continue per plan of care [] Alter current plan (see comments)  [] Plan of care initiated [] Hold pending MD visit [] Discharge    Electronically signed by: Niko Rowley PT

## 2019-08-12 ENCOUNTER — HOSPITAL ENCOUNTER (OUTPATIENT)
Dept: PHYSICAL THERAPY | Age: 69
Setting detail: THERAPIES SERIES
Discharge: HOME | End: 2019-08-12
Payer: MEDICARE

## 2019-08-12 PROCEDURE — 97110 THERAPEUTIC EXERCISES: CPT | Performed by: PHYSICAL THERAPIST

## 2019-08-12 PROCEDURE — G0283 ELEC STIM OTHER THAN WOUND: HCPCS | Performed by: PHYSICAL THERAPIST

## 2019-08-12 NOTE — FLOWSHEET NOTE
[facility]                                                                         Physical Therapy Flow Sheet      Date:  2019    Patient Name:  Gold SMITH ANDI [facility]  :  1950  MRN: [id number]  Restrictions/Precautions:    Medical/Treatment Diagnosis Information:    S93.401D R ankle sprain           M25.571 R ankle pain        ·      Insurance/Certification information:   [facility]  Physician Information: [address] [facility] signed (Y/N):     Date of Patient follow up with Physician:     G-Code (if applicable):      Date G-Code Applied:     lefs 45%    Progress Note: [x]  Yes  []  No  Next due by: Visit #10  MD 19     Latex Allergy:  [x]NO      []YES  Preferred Language for Healthcare:   [x]English       []other:    Visit # Insurance Allowable   11 MC     Pain level:  3/10     SUBJECTIVE: ankle feeling good. Back still hurting     OBJECTIVE:   recip gait up steps with handrails  BUE   Observation:   Test measurements:  AROM +5 DF,  MMT EV 4+/5    RESTRICTIONS/PRECAUTIONS: none    Exercises/Interventions:     Therapeutic Ex Sets/sec Reps Notes HEP   SLR  Standing hip ABD  Standing hip ext  TB ankle 4 way      Belt stretch G/S  HR  SLB  Standing calf stretch  Ankle iso ev/inv  LAQ  HS curls   30 L  30 L  limegreen                B  40# x30 R/L  x30 5# R  x30 R 5#        x30  20x5s B  3x30s B  10x5s  x30 25#  x30        Max cue.  Unable to move foot only          Table leg, siting X  X  X      X  X  X  x   Bridges  crunches  x30  x30     Bike   5 min     Leg press  Step up/back 80# x30 B  6\" x30      Pt ed on monitoring red spots from brace to keep sores from forming 5 min  Lat mal           See other flow sheet              Pt ed on breaking in new shoes  8 min                                                      Manual Intervention                                Therapeutic Exercise and NMR EXR  [x] (92559) Provided verbal/tactile cueing for activities related to strengthening, flexibility, endurance, ROM for improvements in LE, proximal hip, and core control with self care, mobility, lifting, ambulation.  [] (77675) Provided verbal/tactile cueing for activities related to improving balance, coordination, kinesthetic sense, posture, motor skill, proprioception  to assist with LE, proximal hip, and core control in self care, mobility, lifting, ambulation and eccentric single leg control. NMR and Therapeutic Activities:    [] (82089 or 08201) Provided verbal/tactile cueing for activities related to improving balance, coordination, kinesthetic sense, posture, motor skill, proprioception and motor activation to allow for proper function of core, proximal hip and LE with self care and ADLs  [] (35410) Gait Re-education- Provided training and instruction to the patient for proper LE, core and proximal hip recruitment and positioning and eccentric body weight control with ambulation re-education including up and down stairs     Home Exercise Program:    [x] (42237) Reviewed/Progressed HEP activities related to strengthening, flexibility, endurance, ROM of core, proximal hip and LE for functional self-care, mobility, lifting and ambulation/stair navigation   [] (01670)Reviewed/Progressed HEP activities related to improving balance, coordination, kinesthetic sense, posture, motor skill, proprioception of core, proximal hip and LE for self care, mobility, lifting, and ambulation/stair navigation      Manual Treatments:  PROM / STM / Oscillations-Mobs:  G-I, II, III, IV (PA's, Inf., Post.)  [] (33908) Provided manual therapy to mobilize LE, proximal hip and/or LS spine soft tissue/joints for the purpose of modulating pain, promoting relaxation,  increasing ROM, reducing/eliminating soft tissue swelling/inflammation/restriction, improving soft tissue extensibility and allowing for proper ROM for normal function with self care, mobility, lifting and ambulation.

## 2019-08-15 ENCOUNTER — HOSPITAL ENCOUNTER (OUTPATIENT)
Dept: PHYSICAL THERAPY | Age: 69
Setting detail: THERAPIES SERIES
Discharge: HOME | End: 2019-08-15
Payer: MEDICARE

## 2019-08-15 PROCEDURE — 97110 THERAPEUTIC EXERCISES: CPT | Performed by: PHYSICAL THERAPIST

## 2019-08-15 NOTE — FLOWSHEET NOTE
[facility] [facility]                                                                         Physical Therapy Flow Sheet      Date:  8/15/2019    Patient Name:  Dutch SMITH ANDI [facility]  :  1950  MRN: [id number]  Restrictions/Precautions:    Medical/Treatment Diagnosis Information:    S93.401D R ankle sprain           M25.571 R ankle pain        ·      Insurance/Certification information:   [facility]  Physician Information: [address] [facility] signed (Y/N):     Date of Patient follow up with Physician:     G-Code (if applicable):      Date G-Code Applied:     lefs 45%    Progress Note: [x]  Yes  []  No  Next due by: Visit #10  MD 19     Latex Allergy:  [x]NO      []YES  Preferred Language for Healthcare:   [x]English       []other:    Visit # Insurance Allowable   12 MC     Pain level:  3/10     SUBJECTIVE: waiting to hear back fr [facility]. OBJECTIVE:   recip gait up steps with handrails  BUE   Observation:   Test measurements:  AROM +5 DF,  MMT EV 4+/5    RESTRICTIONS/PRECAUTIONS: none    Exercises/Interventions:     Therapeutic Ex Sets/sec Reps Notes HEP   SLR  Standing hip ABD  Standing hip ext  TB ankle 4 way      Belt stretch G/S  HR  SLB  Standing calf stretch  Ankle iso ev/inv  LAQ  HS curls   30 L  30 L  limegreen                B  40# x30 R/L  x30 5# R  x30 R 5#        x30  10x5s B  3x30s B  10x5s  x30 25#  x30        Max cue.  Unable to move foot only          Table leg, siting X  X  X      X  X  X  x   Bridges  crunches  x30  x30     Bike   5 min     Leg press  Step up/back  ADA ABD 80# x30 B  8\" x30  30# x30 R/L      Pt ed on monitoring red spots from brace to keep sores from forming 5 min  Lat mal           See other flow sheet              Pt ed on breaking in new shoes  8 min                                                      Manual Intervention                                Therapeutic Exercise and NMR EXR  [x] (66245) Provided pain  [] Patient limited by other medical complications  [] Other:     Prognosis: [x] Good [] Fair  [] Poor    Patient Requires Follow-up: [x] Yes  [] No    PLAN:   [x] Continue per plan of care [] Alter current plan (see comments)  [] Plan of care initiated [] Hold pending MD visit [] Discharge    Electronically signed by: Clifton Pritchard PT

## 2019-08-20 ENCOUNTER — HOSPITAL ENCOUNTER (OUTPATIENT)
Dept: PHYSICAL THERAPY | Age: 69
Setting detail: THERAPIES SERIES
Discharge: HOME | End: 2019-08-20
Payer: MEDICARE

## 2019-08-20 PROCEDURE — 97110 THERAPEUTIC EXERCISES: CPT | Performed by: PHYSICAL THERAPIST

## 2019-08-20 NOTE — PLAN OF CARE
hear back fr [place]. OBJECTIVE:   recip gait up steps with handrails  BUE   Observation:   Test measurements:  AROM +5 DF,  MMT EV 4+/5    RESTRICTIONS/PRECAUTIONS: none    Exercises/Interventions:     Therapeutic Ex Sets/sec Reps Notes HEP   SLR  Standing hip ABD  Standing hip ext  TB ankle 4 way      Belt stretch G/S  HR  SLB  Standing calf stretch  Ankle iso ev/inv  LAQ  HS curls   30 L  30 L  [place]                B  50# x30 R/L  x30 5# R  x30 R 5#        x30  10x5s B  3x30s B  10x5s  x30 35#  x30        Max cue. Unable to move foot only          Table leg, siting X  X  X      X  X  X  x   Bridges  crunches  x30  x30     Bike   5 min     Leg press  Step up/back  ADA ABD 85# x30 B  8\" x30  35# x30 R/L      Pt ed on monitoring red spots from brace to keep sores from forming 5 min  Lat mal           See other flow sheet              Pt ed on breaking in new shoes  8 min                                                      Manual Intervention                                Therapeutic Exercise and NMR EXR  [x] (87887) Provided verbal/tactile cueing for activities related to strengthening, flexibility, endurance, ROM for improvements in LE, proximal hip, and core control with self care, mobility, lifting, ambulation.  [] (84585) Provided verbal/tactile cueing for activities related to improving balance, coordination, kinesthetic sense, posture, motor skill, proprioception  to assist with LE, proximal hip, and core control in self care, mobility, lifting, ambulation and eccentric single leg control.      NMR and Therapeutic Activities:    [] (23020 or 64815) Provided verbal/tactile cueing for activities related to improving balance, coordination, kinesthetic sense, posture, motor skill, proprioception and motor activation to allow for proper function of core, proximal hip and LE with self care and ADLs  [] (08818) Gait Re-education- Provided training and instruction to the patient for proper LE, core and will demonstrate increased AROM to cooper + to allow for proper joint functioning as indicated by patients Functional Deficits. 3. Patient will demonstrate an increase in Strength to good proximal hip strength and control, within 5/5 MMT in LE to allow for proper functional mobility as indicated by patients Functional Deficits. 4. Patient will return to full functional activities without increased symptoms or restriction. 5. Normal gait comm(patient specific functional goal)    6. recip gait up/down steps 1 flight          Progression Towards Functional goals:  [x] Patient is progressing as expected towards functional goals listed. [] Progression is slowed due to complexities listed. [] Progression has been slowed due to co-morbidities. [] Plan just implemented, too soon to assess goals progression  [] Other:     ASSESSMENT: cont with eversion weakness despite the extra reps he has been doing on the ev mode. . Instructed to start doing HEP every day again to stay strong and help work core stability for his back pain.     Treatment/Activity Tolerance:  [x] Patient tolerated treatment well [] Patient limited by fatique  [] Patient limited by pain  [] Patient limited by other medical complications  [] Other:     Prognosis: [x] Good [] Fair  [] Poor    Patient Requires Follow-up: [x] Yes  [] No    PLAN: To try HEP unitl back from trip and call with questions   [] Continue per plan of care [] Alter current plan (see comments)  [] Plan of care initiated [] Hold pending MD visit [] Discharge    Electronically signed by: Jose Sow PT

## 2019-08-21 ENCOUNTER — OFFICE VISIT (OUTPATIENT)
Dept: ORTHOPEDIC SURGERY | Age: 69
End: 2019-08-21
Payer: MEDICARE

## 2019-08-21 VITALS
DIASTOLIC BLOOD PRESSURE: 79 MMHG | HEART RATE: 67 BPM | SYSTOLIC BLOOD PRESSURE: 149 MMHG | HEIGHT: 74 IN | BODY MASS INDEX: 36.33 KG/M2 | WEIGHT: 283.07 LBS

## 2019-08-21 DIAGNOSIS — S93.401A MILD ANKLE SPRAIN, RIGHT, INITIAL ENCOUNTER: Primary | ICD-10-CM

## 2019-08-21 PROCEDURE — G8417 CALC BMI ABV UP PARAM F/U: HCPCS | Performed by: PODIATRIST

## 2019-08-21 PROCEDURE — 4040F PNEUMOC VAC/ADMIN/RCVD: CPT | Performed by: PODIATRIST

## 2019-08-21 PROCEDURE — 99213 OFFICE O/P EST LOW 20 MIN: CPT | Performed by: PODIATRIST

## 2019-08-21 PROCEDURE — 1123F ACP DISCUSS/DSCN MKR DOCD: CPT | Performed by: PODIATRIST

## 2019-08-21 PROCEDURE — 1036F TOBACCO NON-USER: CPT | Performed by: PODIATRIST

## 2019-08-21 PROCEDURE — G8427 DOCREV CUR MEDS BY ELIG CLIN: HCPCS | Performed by: PODIATRIST

## 2019-08-21 PROCEDURE — 3017F COLORECTAL CA SCREEN DOC REV: CPT | Performed by: PODIATRIST

## 2019-08-21 NOTE — PROGRESS NOTES
HISTORY OF PRESENT ILLNESS: This is a followup for a right lateral ankle sprain. He states that he is no longer having pain. PHYSICAL EXAMINATION: He has minimal palpable tenderness over the right lateral ankle. There is very mild edema of the lateral ankle. Single limb stance strength of the right is still significantly weaker than the left. No open lesions or fracture blisters are present. Pedal pulses are palpable bilateral.  The sensation is grossly intact bilateral. There is no pain over the deltoid  ligament. There is no pain over the Achilles tendon and this is palpated to be  intact. Thompsonâs test is negative for a complete rupture of the Achilles  tendon. There is no pain over the peroneal tendons. Range of motion at the ankle does not demonstrate subluxation of the peroneal tendons. The patient is able to dorsiflex and plantarflex the foot, as well as joao and invert independently. ASSESSMENT: Right Lateral Ankle Sprain with ankle pain. PLAN: He can discontinue formal physical therapy and continue with home exercises. I advised him to continue using the ankle brace until he improves his strength. The lack of strength to the right lower leg may actually be due more to his lower back condition than the sprain ankle. I will see him back as needed.

## 2019-11-27 ENCOUNTER — OFFICE VISIT (OUTPATIENT)
Dept: ORTHOPEDIC SURGERY | Age: 69
End: 2019-11-27
Payer: MEDICARE

## 2019-11-27 VITALS
HEIGHT: 74 IN | SYSTOLIC BLOOD PRESSURE: 155 MMHG | BODY MASS INDEX: 36.33 KG/M2 | WEIGHT: 283.07 LBS | HEART RATE: 74 BPM | DIASTOLIC BLOOD PRESSURE: 72 MMHG

## 2019-11-27 DIAGNOSIS — L97.519 TYPE 2 DIABETES MELLITUS WITH RIGHT DIABETIC FOOT ULCER (HCC): Primary | ICD-10-CM

## 2019-11-27 DIAGNOSIS — E11.621 TYPE 2 DIABETES MELLITUS WITH RIGHT DIABETIC FOOT ULCER (HCC): Primary | ICD-10-CM

## 2019-11-27 DIAGNOSIS — L03.115 CELLULITIS OF RIGHT FOOT: ICD-10-CM

## 2019-11-27 DIAGNOSIS — L02.611 ABSCESS OF RIGHT FOOT: ICD-10-CM

## 2019-11-27 DIAGNOSIS — E11.42 DM TYPE 2 WITH DIABETIC PERIPHERAL NEUROPATHY (HCC): ICD-10-CM

## 2019-11-27 PROCEDURE — 1036F TOBACCO NON-USER: CPT | Performed by: PODIATRIST

## 2019-11-27 PROCEDURE — L3260 AMBULATORY SURGICAL BOOT EAC: HCPCS | Performed by: PODIATRIST

## 2019-11-27 PROCEDURE — 3017F COLORECTAL CA SCREEN DOC REV: CPT | Performed by: PODIATRIST

## 2019-11-27 PROCEDURE — G8484 FLU IMMUNIZE NO ADMIN: HCPCS | Performed by: PODIATRIST

## 2019-11-27 PROCEDURE — G8417 CALC BMI ABV UP PARAM F/U: HCPCS | Performed by: PODIATRIST

## 2019-11-27 PROCEDURE — 11042 DBRDMT SUBQ TIS 1ST 20SQCM/<: CPT | Performed by: PODIATRIST

## 2019-11-27 PROCEDURE — G8427 DOCREV CUR MEDS BY ELIG CLIN: HCPCS | Performed by: PODIATRIST

## 2019-11-27 PROCEDURE — 99213 OFFICE O/P EST LOW 20 MIN: CPT | Performed by: PODIATRIST

## 2019-11-27 PROCEDURE — 2022F DILAT RTA XM EVC RTNOPTHY: CPT | Performed by: PODIATRIST

## 2019-11-27 PROCEDURE — 1123F ACP DISCUSS/DSCN MKR DOCD: CPT | Performed by: PODIATRIST

## 2019-11-27 PROCEDURE — 4040F PNEUMOC VAC/ADMIN/RCVD: CPT | Performed by: PODIATRIST

## 2019-11-27 RX ORDER — AMOXICILLIN AND CLAVULANATE POTASSIUM 875; 125 MG/1; MG/1
1 TABLET, FILM COATED ORAL 2 TIMES DAILY
Qty: 20 TABLET | Refills: 0 | Status: SHIPPED | OUTPATIENT
Start: 2019-11-27 | End: 2022-10-26 | Stop reason: CLARIF

## 2019-12-02 LAB
ANAEROBIC CULTURE: ABNORMAL
GRAM STAIN RESULT: ABNORMAL
ORGANISM: ABNORMAL
WOUND/ABSCESS: ABNORMAL
WOUND/ABSCESS: ABNORMAL

## 2019-12-04 ENCOUNTER — OFFICE VISIT (OUTPATIENT)
Dept: ORTHOPEDIC SURGERY | Age: 69
End: 2019-12-04
Payer: MEDICARE

## 2019-12-04 VITALS
WEIGHT: 283.07 LBS | SYSTOLIC BLOOD PRESSURE: 187 MMHG | HEIGHT: 74 IN | HEART RATE: 60 BPM | DIASTOLIC BLOOD PRESSURE: 92 MMHG | BODY MASS INDEX: 36.33 KG/M2

## 2019-12-04 DIAGNOSIS — E11.621 TYPE 2 DIABETES MELLITUS WITH RIGHT DIABETIC FOOT ULCER (HCC): Primary | ICD-10-CM

## 2019-12-04 DIAGNOSIS — L03.115 CELLULITIS OF RIGHT FOOT: ICD-10-CM

## 2019-12-04 DIAGNOSIS — L02.611 ABSCESS OF RIGHT FOOT: ICD-10-CM

## 2019-12-04 DIAGNOSIS — E11.42 DM TYPE 2 WITH DIABETIC PERIPHERAL NEUROPATHY (HCC): ICD-10-CM

## 2019-12-04 DIAGNOSIS — L97.519 TYPE 2 DIABETES MELLITUS WITH RIGHT DIABETIC FOOT ULCER (HCC): Primary | ICD-10-CM

## 2019-12-04 PROCEDURE — 1036F TOBACCO NON-USER: CPT | Performed by: PODIATRIST

## 2019-12-04 PROCEDURE — 1123F ACP DISCUSS/DSCN MKR DOCD: CPT | Performed by: PODIATRIST

## 2019-12-04 PROCEDURE — G8417 CALC BMI ABV UP PARAM F/U: HCPCS | Performed by: PODIATRIST

## 2019-12-04 PROCEDURE — 4040F PNEUMOC VAC/ADMIN/RCVD: CPT | Performed by: PODIATRIST

## 2019-12-04 PROCEDURE — 2022F DILAT RTA XM EVC RTNOPTHY: CPT | Performed by: PODIATRIST

## 2019-12-04 PROCEDURE — 11042 DBRDMT SUBQ TIS 1ST 20SQCM/<: CPT | Performed by: PODIATRIST

## 2019-12-04 PROCEDURE — G8427 DOCREV CUR MEDS BY ELIG CLIN: HCPCS | Performed by: PODIATRIST

## 2019-12-04 PROCEDURE — G8484 FLU IMMUNIZE NO ADMIN: HCPCS | Performed by: PODIATRIST

## 2019-12-04 PROCEDURE — 3017F COLORECTAL CA SCREEN DOC REV: CPT | Performed by: PODIATRIST

## 2019-12-11 ENCOUNTER — OFFICE VISIT (OUTPATIENT)
Dept: ORTHOPEDIC SURGERY | Age: 69
End: 2019-12-11
Payer: MEDICARE

## 2019-12-11 VITALS — BODY MASS INDEX: 35.89 KG/M2 | HEIGHT: 72 IN | WEIGHT: 265 LBS

## 2019-12-11 DIAGNOSIS — L97.519 TYPE 2 DIABETES MELLITUS WITH RIGHT DIABETIC FOOT ULCER (HCC): Primary | ICD-10-CM

## 2019-12-11 DIAGNOSIS — E11.621 TYPE 2 DIABETES MELLITUS WITH RIGHT DIABETIC FOOT ULCER (HCC): Primary | ICD-10-CM

## 2019-12-11 DIAGNOSIS — E11.42 DM TYPE 2 WITH DIABETIC PERIPHERAL NEUROPATHY (HCC): ICD-10-CM

## 2019-12-11 PROCEDURE — 1036F TOBACCO NON-USER: CPT | Performed by: PODIATRIST

## 2019-12-11 PROCEDURE — 3045F PR MOST RECENT HEMOGLOBIN A1C LEVEL 7.0-9.0%: CPT | Performed by: PODIATRIST

## 2019-12-11 PROCEDURE — 1123F ACP DISCUSS/DSCN MKR DOCD: CPT | Performed by: PODIATRIST

## 2019-12-11 PROCEDURE — G8484 FLU IMMUNIZE NO ADMIN: HCPCS | Performed by: PODIATRIST

## 2019-12-11 PROCEDURE — 11042 DBRDMT SUBQ TIS 1ST 20SQCM/<: CPT | Performed by: PODIATRIST

## 2019-12-11 PROCEDURE — 4040F PNEUMOC VAC/ADMIN/RCVD: CPT | Performed by: PODIATRIST

## 2019-12-11 PROCEDURE — 3017F COLORECTAL CA SCREEN DOC REV: CPT | Performed by: PODIATRIST

## 2019-12-11 PROCEDURE — 2022F DILAT RTA XM EVC RTNOPTHY: CPT | Performed by: PODIATRIST

## 2019-12-11 PROCEDURE — G8417 CALC BMI ABV UP PARAM F/U: HCPCS | Performed by: PODIATRIST

## 2019-12-11 PROCEDURE — G8427 DOCREV CUR MEDS BY ELIG CLIN: HCPCS | Performed by: PODIATRIST

## 2019-12-18 ENCOUNTER — OFFICE VISIT (OUTPATIENT)
Dept: ORTHOPEDIC SURGERY | Age: 69
End: 2019-12-18
Payer: MEDICARE

## 2019-12-18 VITALS — BODY MASS INDEX: 35.89 KG/M2 | HEIGHT: 72 IN | WEIGHT: 265 LBS

## 2019-12-18 DIAGNOSIS — E11.621 TYPE 2 DIABETES MELLITUS WITH RIGHT DIABETIC FOOT ULCER (HCC): Primary | ICD-10-CM

## 2019-12-18 DIAGNOSIS — E11.42 DM TYPE 2 WITH DIABETIC PERIPHERAL NEUROPATHY (HCC): ICD-10-CM

## 2019-12-18 DIAGNOSIS — L97.519 TYPE 2 DIABETES MELLITUS WITH RIGHT DIABETIC FOOT ULCER (HCC): Primary | ICD-10-CM

## 2019-12-18 PROCEDURE — G8417 CALC BMI ABV UP PARAM F/U: HCPCS | Performed by: PODIATRIST

## 2019-12-18 PROCEDURE — 1123F ACP DISCUSS/DSCN MKR DOCD: CPT | Performed by: PODIATRIST

## 2019-12-18 PROCEDURE — 3017F COLORECTAL CA SCREEN DOC REV: CPT | Performed by: PODIATRIST

## 2019-12-18 PROCEDURE — 99212 OFFICE O/P EST SF 10 MIN: CPT | Performed by: PODIATRIST

## 2019-12-18 PROCEDURE — 3045F PR MOST RECENT HEMOGLOBIN A1C LEVEL 7.0-9.0%: CPT | Performed by: PODIATRIST

## 2019-12-18 PROCEDURE — 2022F DILAT RTA XM EVC RTNOPTHY: CPT | Performed by: PODIATRIST

## 2019-12-18 PROCEDURE — 4040F PNEUMOC VAC/ADMIN/RCVD: CPT | Performed by: PODIATRIST

## 2019-12-18 PROCEDURE — G8484 FLU IMMUNIZE NO ADMIN: HCPCS | Performed by: PODIATRIST

## 2019-12-18 PROCEDURE — 1036F TOBACCO NON-USER: CPT | Performed by: PODIATRIST

## 2019-12-18 PROCEDURE — G8427 DOCREV CUR MEDS BY ELIG CLIN: HCPCS | Performed by: PODIATRIST

## 2020-02-05 ENCOUNTER — OFFICE VISIT (OUTPATIENT)
Dept: ORTHOPEDIC SURGERY | Age: 70
End: 2020-02-05
Payer: MEDICARE

## 2020-02-05 VITALS — WEIGHT: 264.99 LBS | HEIGHT: 72 IN | BODY MASS INDEX: 35.89 KG/M2

## 2020-02-05 PROCEDURE — 4040F PNEUMOC VAC/ADMIN/RCVD: CPT | Performed by: PODIATRIST

## 2020-02-05 PROCEDURE — 3046F HEMOGLOBIN A1C LEVEL >9.0%: CPT | Performed by: PODIATRIST

## 2020-02-05 PROCEDURE — G8417 CALC BMI ABV UP PARAM F/U: HCPCS | Performed by: PODIATRIST

## 2020-02-05 PROCEDURE — G8427 DOCREV CUR MEDS BY ELIG CLIN: HCPCS | Performed by: PODIATRIST

## 2020-02-05 PROCEDURE — 1123F ACP DISCUSS/DSCN MKR DOCD: CPT | Performed by: PODIATRIST

## 2020-02-05 PROCEDURE — 3017F COLORECTAL CA SCREEN DOC REV: CPT | Performed by: PODIATRIST

## 2020-02-05 PROCEDURE — 1036F TOBACCO NON-USER: CPT | Performed by: PODIATRIST

## 2020-02-05 PROCEDURE — G8484 FLU IMMUNIZE NO ADMIN: HCPCS | Performed by: PODIATRIST

## 2020-02-05 PROCEDURE — 99212 OFFICE O/P EST SF 10 MIN: CPT | Performed by: PODIATRIST

## 2020-02-05 PROCEDURE — 2022F DILAT RTA XM EVC RTNOPTHY: CPT | Performed by: PODIATRIST

## 2021-09-17 ENCOUNTER — OFFICE VISIT (OUTPATIENT)
Dept: ORTHOPEDIC SURGERY | Age: 71
End: 2021-09-17
Payer: MEDICARE

## 2021-09-17 DIAGNOSIS — M75.81 ROTATOR CUFF TENDINITIS, RIGHT: ICD-10-CM

## 2021-09-17 DIAGNOSIS — M25.511 ACUTE PAIN OF RIGHT SHOULDER: Primary | ICD-10-CM

## 2021-09-17 PROCEDURE — G8427 DOCREV CUR MEDS BY ELIG CLIN: HCPCS | Performed by: STUDENT IN AN ORGANIZED HEALTH CARE EDUCATION/TRAINING PROGRAM

## 2021-09-17 PROCEDURE — 20610 DRAIN/INJ JOINT/BURSA W/O US: CPT | Performed by: STUDENT IN AN ORGANIZED HEALTH CARE EDUCATION/TRAINING PROGRAM

## 2021-09-17 PROCEDURE — 1036F TOBACCO NON-USER: CPT | Performed by: STUDENT IN AN ORGANIZED HEALTH CARE EDUCATION/TRAINING PROGRAM

## 2021-09-17 PROCEDURE — G8421 BMI NOT CALCULATED: HCPCS | Performed by: STUDENT IN AN ORGANIZED HEALTH CARE EDUCATION/TRAINING PROGRAM

## 2021-09-17 PROCEDURE — 4040F PNEUMOC VAC/ADMIN/RCVD: CPT | Performed by: STUDENT IN AN ORGANIZED HEALTH CARE EDUCATION/TRAINING PROGRAM

## 2021-09-17 PROCEDURE — 99203 OFFICE O/P NEW LOW 30 MIN: CPT | Performed by: STUDENT IN AN ORGANIZED HEALTH CARE EDUCATION/TRAINING PROGRAM

## 2021-09-17 PROCEDURE — 1123F ACP DISCUSS/DSCN MKR DOCD: CPT | Performed by: STUDENT IN AN ORGANIZED HEALTH CARE EDUCATION/TRAINING PROGRAM

## 2021-09-17 PROCEDURE — 3017F COLORECTAL CA SCREEN DOC REV: CPT | Performed by: STUDENT IN AN ORGANIZED HEALTH CARE EDUCATION/TRAINING PROGRAM

## 2021-09-17 RX ORDER — LIDOCAINE HYDROCHLORIDE 10 MG/ML
4 INJECTION, SOLUTION INFILTRATION; PERINEURAL ONCE
Status: COMPLETED | OUTPATIENT
Start: 2021-09-17 | End: 2021-09-17

## 2021-09-17 RX ORDER — METHYLPREDNISOLONE ACETATE 40 MG/ML
80 INJECTION, SUSPENSION INTRA-ARTICULAR; INTRALESIONAL; INTRAMUSCULAR; SOFT TISSUE ONCE
Status: COMPLETED | OUTPATIENT
Start: 2021-09-17 | End: 2021-09-17

## 2021-09-17 RX ADMIN — LIDOCAINE HYDROCHLORIDE 4 ML: 10 INJECTION, SOLUTION INFILTRATION; PERINEURAL at 10:18

## 2021-09-17 RX ADMIN — METHYLPREDNISOLONE ACETATE 80 MG: 40 INJECTION, SUSPENSION INTRA-ARTICULAR; INTRALESIONAL; INTRAMUSCULAR; SOFT TISSUE at 10:18

## 2021-09-17 NOTE — PROGRESS NOTES
Date:  2021    Name:  Felicia Collazo  Address:  [address] [place] [address] [facility]    :  1950      Age:   66 y.o.    SSN:  [id number]      Medical Record Number:  [id number]    Reason for Visit:    Chief Complaint    Shoulder Pain      DOS:2021     HPI: Shanique Mckee is a right hand dominant 66 y.o. male here today for new patient evaluation regarding his right shoulder. He reports R shoulder pain for the past month after sustaining a fall backwards and helping his friend carry a smoker. He denies prior shoulder pain in the past. Since then he has had worsening pain to the shoulder and difficulty with sleeping on his right side. He denies numbness and tingling down the arm. He has a history of DM on insulin, last A1c 7.1, as well as afib for which he reports taking Xarelto. ROS: All systems reviewed on patient intake form. Pertinent items are noted in HPI.         Past Medical History:   Diagnosis Date    Atrial fibrillation ([address]     Cancer ([address]     skin    Diabetes mellitus ([facility]     Hyperlipidemia     Hypertension     TIA (transient ischemic attack)         Past Surgical History:   Procedure Laterality Date    COLONOSCOPY      COLONOSCOPY  11    Diverticulosis    TONSILLECTOMY         Family History   Problem Relation Age of Onset    Heart Disease Mother     Cancer Father         colon       Social History     Socioeconomic History    Marital status:      Spouse name: None    Number of children: None    Years of education: None    Highest education level: None   Occupational History    None   Tobacco Use    Smoking status: Never Smoker    Smokeless tobacco: Never Used   Substance and Sexual Activity    Alcohol use: Yes     Comment: 1/ month    Drug use: None    Sexual activity: Yes     Partners: Female   Other Topics Concern    None   Social History Narrative    None     Social Determinants of Health     Financial Resource Strain:     Difficulty of Paying Living Expenses:    Food Insecurity:     Worried About Running Out of Food in the Last Year:     [address] in the Last Year:    Transportation Needs:     Lack of Transportation (Medical):  Lack of Transportation (Non-Medical):    Physical Activity:     Days of Exercise per Week:     Minutes of Exercise per Session:    Stress:     Feeling of Stress :    Social Connections:     Frequency of Communication with Friends and Family:     Frequency of Social Gatherings with Friends and Family:     Attends [employer] Services:     Active Member of Clubs or Organizations:     Attends Club or Organization Meetings:     Marital Status:    Intimate Partner Violence:     Fear of Current or Ex-Partner:     Emotionally Abused:     Physically Abused:     Sexually Abused:        Current Outpatient Medications   Medication Sig Dispense Refill    amoxicillin-clavulanate (AUGMENTIN) 875-125 MG per tablet Take 1 tablet by mouth 2 times daily 20 tablet 0    doxycycline hyclate (VIBRA-TABS) 100 MG tablet TAKE 1 TABLET TWICE DAILY FOR FLARES OF SORES ON SCALP      Dabigatran Etexilate Mesylate 110 MG CAPS Take by mouth      celecoxib (CELEBREX) 200 MG capsule Take 200 mg by mouth      Ascorbic Acid (VITAMIN C) 500 MG CAPS Take 1 tablet by mouth      mupirocin (BACTROBAN) 2 % ointment Apply to infected skin two times daily for 10 days      glimepiride (AMARYL) 2 MG tablet Take 4 mg by mouth every morning (before breakfast).  warfarin (COUMADIN) 2.5 MG tablet Take 2.5 mg by mouth Daily.  pantoprazole (PROTONIX) 40 MG tablet Take 40 mg by mouth daily.  flecainide (TAMBOCOR) 100 MG tablet Take 150 mg by mouth 2 times daily.  metoprolol (LOPRESSOR) 50 MG tablet Take 50 mg by mouth 2 times daily.  metformin (GLUCOPHAGE) 500 MG tablet Take 1,000 mg by mouth 2 times daily (with meals).         fenofibrate (TRICOR) 145 MG tablet Take 90 mg by mouth daily.        atorvastatin (LIPITOR) 10 MG tablet Take 10 mg by mouth daily.  olmesartan (BENICAR) 20 MG tablet Take 40 mg by mouth daily.  Enoxaparin Sodium (LOVENOX SC) Inject  into the skin. No current facility-administered medications for this visit. Allergies   Allergen Reactions    Cipro Xr     Ciprofloxacin Swelling       Vital signs: There were no vitals taken for this visit. R shoulder exam    Inspection:  Held in a normal posture. Normal contour at the acromioclavicular joint. No swelling, ecchymosis, or erythema about the shoulder. No atrophy appreciated. No scapular winging. Palpation:  No subacromial crepitus. tender to palpation about the acromioclavicular joint and the greater tuberosity footprint. Range of Motion: Full passive and active ROM with pain on terminal flexion. Normal scapulothoracic rhythm. Strength:  4+/5 supra and infraspinatus, 4+ subscapularis    Stability: Deferred    Special Tests: Impingement findings are positive. Labral findings are negative. Speed sign and Yergason signs are both negative. Crossover sign is positive. Other findings: The skin is warm dry and well perfused. 2+ radial pulse. Sensation is intact to light touch over the deltoid. L comparison shoulder exam    Inspection:  Held in a normal posture. Normal contour at the acromioclavicular joint. No swelling, ecchymosis, or erythema about the shoulder. No atrophy appreciated. No scapular winging. Palpation:  No subacromial crepitus. No tenderness of the AC joint. No greater tuberosity tenderness. No tenderness in the bicipital groove. Range of Motion: Full passive and active ROM. Normal scapulothoracic rhythm. Strength:  Normal supraspinatus, infraspinatus, and subscapularis muscle strength. Stability: No anterior instability. No posterior instability. Special Tests: Impingement findings are negative. Labral findings are negative.  Speed sign and first given his Xarelto use. Eneida Trejo is in agreement with this plan. All questions were answered to patient's satisfaction and was encouraged to call with any further questions. The indications and risks of steroid injection as well as treatment alternatives were discussed with the patient who consented to the procedure. Under sterile conditions and after informed consent was obtained, using posterior approach the patient was given an injection into the right shoulder subacromial space. 2mL 40 mg of Depo-Medrol and 4 mL of 1% lidocaine were placed in the shoulder after it was prepped with chlorhexidine. This resulted in good relief of symptoms. There were no complications. The patient was advised to ice the shoulder this evening and to avoid vigorous activities for the next 2 days. They were advised to call us if there was any erythema, enduration, swelling or increasing pain.       Nelson Olson, DO  Orthopedic Surgery and Sports Medicine  9/17/2021    Orders Placed This Encounter   Procedures    XR SHOULDER RIGHT (MIN 2 VIEWS)     Standing Status:   Future     Number of Occurrences:   1     Standing Expiration Date:   9/17/2022    Ambulatory referral to Physical Therapy     Referral Priority:   Routine     Referral Type:   Eval and Treat     Referral Reason:   Specialty Services Required     Requested Specialty:   Physical Therapy     Number of Visits Requested:   1    [place] ARTHROCENTESIS ASPIR&/INJ MAJOR JT/BURSA W/O US

## 2021-10-06 ENCOUNTER — HOSPITAL ENCOUNTER (OUTPATIENT)
Dept: PHYSICAL THERAPY | Age: 71
Setting detail: THERAPIES SERIES
Discharge: HOME | End: 2021-10-06
Payer: MEDICARE

## 2021-10-06 PROCEDURE — 97161 PT EVAL LOW COMPLEX 20 MIN: CPT

## 2021-10-06 PROCEDURE — 97110 THERAPEUTIC EXERCISES: CPT

## 2021-10-06 NOTE — FLOWSHEET NOTE
[facility] Therapy  [address]     [place], [facility]  Phone: [phone]       Fax:   [phone]       Physical Therapy Certification    Dear Dr. Desean Gomez, DO,    We had the pleasure of evaluating the following patient for physical therapy services at [address] [facility]. A summary of our findings can be found in the initial assessment below. This includes our plan of care. If you have any questions or concerns regarding these findings, please do not hesitate to contact me at the office phone number checked above. Thank you for the referral.       Physician Signature:_______________________________Date:__________________  By signing above (or electronic signature), therapist's plan is approved by physician      Patient: Adeola Frank   : 1950   MRN: [id number]             Evaluation Date: 10/6/2021        Medical Diagnosis Information:    Rotator cuff tendinitis, right M75.81                                              Insurance information:  [address] Medicare Supp     Precautions/ Contra-indications:   Latex Allergy:  [x]NO      []YES     Preferred Language for Healthcare:   [x]English       []other:    C-SSRS Triggered by Intake questionnaire (Past 2 wk assessment):   [x] No, Questionnaire did not trigger screening.   [] Yes, Patient intake triggered further evaluation      [] C-SSRS Screening completed  [] PCP notified via Plan of Care  [] Emergency services notified        SUBJECTIVE FINDINGS      History of Present Illness:      Pt presents with C/o R shoulder pain that increases when he lifts it and when he sleeps on it. He describes it as a dull pain, yesterday when he woke up the pain was enough to keep him awake. Pt reports he is a side sleeper and it is usually on the side. Because of this and the pain he is not sleeping as much, \"maybe 4.5 hrs when it is painful\".  Denies any clicking/popping in shoulder, no N&T. Pt also denies neck pain. He has tried steroid injection, which helped a little. He is due for another shot in another area, but physician would like to trial PT and conservative Tx before imaging/further interventions. Pain       Patient reports pain is 3 /10 pain at present and  6/10 pain at its worst.  Pain increases with : moving/lifting/sleeping on it      Decreases with: rest  Pt. reports pain with coughing, sneezing and laughing:  []Yes   [x]No        Current Functional Limitations:   [x]Yes   []No  Functional Complaints: volunteer duties, pain with picking up objects     PLOF:   [x]No functional limitations   []Pre-exisiting limitations:  Pt's sleep is affected? 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 Has not been able to do these much lately because of symptoms.      Sport/recreational activities: general exercise/walking        Patient goal for therapy:  \"Address shoulder to prevent surgery and RC weakness\"        OBJECTIVE FINDINGS    Gait/Steps/Balance       [x]Gait WNL                           [] Deviations on a level linoleum surface include:        Posture: [] WNL  [x]Forward head  []Forward flexed trunk   []Pronounced CT junction    [x]Increased thoracic kyphosis   []Scoliosis  []Scapular winging  []Decreased WB on   []R   []L     [x]Other:  Depressed R shoulder/gaurding      Range of Motion  [x]All [facility]  [x]Cervical Spine   []Thoracic Spine     ROM Deficits Identified             *Denotes pain AROM              % Decresased PROM              % Decreased MMT/Resisted Tests   Flexion      Extension      Sidebending Left      Sidebending Right      Rotation Left      Rotation Right          UE Range of Motion/Strength Testing      [x]All ROM WFL except as marked below   [x]All strength WFL (5/5) except as marked below [x]All myotomes WFL (5/5) except as marked below      Range Tested MMT / Resisted PROM AROM Comments   *denotes pain Left Right Left Right Left Right    Cervical Flexion C1-2          Cervical Sidebend  C3          Shoulder Shrug  C4          Shoulder Flexion 4+/5 4-/5 100% 75% 100% 60%    Shoulder Extension          Shoulder Abduction  C5 4+/5 4-/5 100% 75% 100% 50%    Shoulder Adduction           Shoulder IR          Shoulder ER 4+/5 4-/5 100% 75% 100% 80%    Elbow Flexion  C6          Elbow Extension C7          Pronation          Supination          Wrist Flexion C7          Wrist Extension C6          Radial Deviation          Ulnar Deviation                     Thumb Ext C8          Intrinsics T1              Scapular Strength    [x]All WFL (5/5) except as marked below   []NT     Scapula Left Right   Upper Trapezius     Middle Trapezius 3+/5 3+/5   Lower Trapezius 3+/5 3+/5   Rhomboid 3+/5 3+/5   Serratus Anterior       Latissimus Dorsi          Special Tests- UE    Special Test Abnormal Findings   Empty can test/supraspinatus []Neg   [x]Pos R   []Pos L      Drop arm test [x]Neg   []Pos R   []Pos L      Neer's impingement [x]Neg   []Pos R   []Pos L      Speed's test  [x]Neg   []Pos R   []Pos L      Elbow varus stress []Neg   []Pos R   []Pos L      Elbow valgus stress  []Neg   []Pos R   []Pos L      Tinel's sign  []Neg   []Pos R   []Pos L     []Elbow   []Wrist    Finkelstein's test []Neg   []Pos R   []Pos L      Phalen's test  []Neg   []Pos R   []Pos L      Other []Neg   []Pos R   []Pos L      Other  []Neg   []Pos R   []Pos L        Palpation     Patient reported tenderness with palpation:  [x]Yes   []No   []NA  Location:   SupraS, Deltoid insertion pt, general tenderness in anterior shoulder  PT notes warmth:  []Yes   [x]No   []NA  Location:   PT notes increased muscle tone:   []Yes   [x]No   []NA  Location:   PT notes crepitus with palpation:   []Yes   [x]No   []NA  Location:   Ligament tenderness/provocation:   []Yes   [x]No   []NA  Location:    PT notes decreased scar mobility:   []Yes   [x]No   []NA    Appearance    PT notes swelling:   []Yes   [x]No   []NA  Location:     PT notes redness:  []Yes   [x]No   []NA  Location:   PT notes drainage:   []Yes   [x]No   []NA  Location:        Deep Tendon Reflexes  WNL  Dermatomal Sensation:WNL      Bandages/Dressings/Incisions:  [x]NA                         [x] Patient history, allergies, meds reviewed. Medical chart reviewed. See intake form. Review Of Systems (ROS):  [x]Performed Review of systems (Integumentary, CardioPulmonary, Neurological) by intake and observation. Intake form has been scanned into medical record. Patient has been instructed to contact their primary care physician regarding ROS issues if not already being addressed at this time.         Co-morbidities/Complexities (which will affect course of rehabilitation):   []None           Arthritic conditions   []Rheumatoid arthritis (M05.9)  []Osteoarthritis (M19.91)   Cardiovascular conditions   []Hypertension (I10)  []Hyperlipidemia (E78.5)  []Angina pectoris (I20)  []Atherosclerosis (I70)   Musculoskeletal conditions   []Disc pathology   []Congenital spine pathologies   []Prior surgical intervention  []Osteoporosis (M81.8)  []Osteopenia (M85.8)   Endocrine conditions   []Hypothyroid (E03.9)  []Hyperthyroid Gastrointestinal conditions   []Constipation (N58.08)   Metabolic conditions   []Morbid obesity (E66.01)  []Diabetes type 1(E10.65) or 2 (E11.65)   [x]Neuropathy (G60.9)     Pulmonary conditions   []Asthma (J45)  []Coughing   []COPD (J44.9)   Psychological Disorders  []Anxiety (F41.9)  []Depression (F32.9)   []Other:   []Other:            Barriers to/and or personal factors that will affect rehab potential:   []None                 []Age  []Sex    []Smoker              []Motivation/Lack of Motivation                        [x]Co-Morbidities              []Cognitive Function, education/learning barriers              []Environmental, home barriers              [x]profession/work barriers  []past PT/medical experience  []other:  Justification:  Current activities are bothersome for pt's symptoms. Falls Risk Assessment (30 days): [x]NA  [x] Falls Risk assessed and no intervention required. [] Falls Risk assessed and Patient requires intervention due to being higher risk   TUG score (>12s at risk):     [] Falls education provided, including:         ASSESSMENT: Pt is  66 Y. O  male, presenting with c/o  R shoulder pain/RC tendinitis. Assessment reveals deficits in strength, ROM, alignment as well as increased pain. Pt will benefit from cont PT to address these deficits and promote return to highest level of functional independence.       Functional Impairments:     []Noted spinal or UE joint hypomobility   []Noted spinal or UE joint hypermobility   [x]Decreased spinal/UE functional ROM   []Abnormal reflexes/sensation/myotomal/dermatomal deficits   [x]Decreased RC/scapular/core strength and neuromuscular control    [x]Decreased UE functional strength    []other:       Functional Activity Limitations (from functional questionnaire and intake)   [x]Reduced ability to tolerate prolonged functional positions   [x]Reduced ability or difficulty with changes of positions or transfers between positions   [x]Reduced ability to maintain good posture and demonstrate good body mechanics with sitting, bending, and lifting   [x] Reduced ability or tolerance with driving and/or computer work   [x]Reduced ability to perform lifting, reaching, carrying tasks   [x]Reduced ability to reach behind back   [x]Reduced ability to sleep    [x]Reduced ability to tolerate any impact through UE or spine   [x]Reduced ability to  or hold objects   [x]Reduced ability to throw or toss an object   []other:     Participation Restrictions   [x]Reduced participation in self care activities   [x]Reduced participation in home management activities   [x]Reduced participation in work activities   [x]Reduced participation in social activities. [x]Reduced participation in sport/recreational activities. Classification :    []signs/symptoms consistent with post-surgical status including decreased ROM, strength and function.     []signs/symptoms consistent with joint sprain/strain    []signs/symptoms consistent with shoulder impingement (internal, external, primary or secondary)   []signs/symptoms consistent with shoulder/elbow/wrist tendinopathy   [x]Signs/symptoms consistent with Rotator cuff weakness   []sign/symptoms consistent with labral tear   []signs/symptoms consistent with rib dysfunction   [x]signs/symptoms consistent with postural dysfunction   []signs/symptoms consistent with Glenohumeral IR Deficit - <45 degrees   []signs/symptoms consistent with facet dysfunction of cervical/thoracic spine   []signs/symptoms consistent with pathology which may benefit from Dry Needling   []signs/symptoms which may limit the use of advanced manual therapy techniques: (Elevated CV risk profile, recent trauma, intolerance to end range positions, prior TIA, visual issues, UE neurological compromise )    Prognosis/Rehab Potential:      []Excellent   [x]Good    []Fair   []Poor    Tolerance of evaluation/treatment:    []Excellent   [x]Good    []Fair   []Poor     Physical Therapy Evaluation Complexity Justification  [x] A history of present problem with:  [] no personal factors and/or comorbidities that impact the plan of care;  [x]1-2 personal factors and/or comorbidities that impact the plan of care  []3 personal factors and/or comorbidities that impact the plan of care  [x] An examination of body systems using standardized tests and measures addressing any of the following: body structures and functions (impairments), activity limitations, and/or participation restrictions;:  [] a total of 1-2 or more elements   [x] a total of 3 or more elements   [] a total of 4 or more elements   [x] A clinical presentation with:  [] stable and/or uncomplicated characteristics   [x] evolving clinical presentation with changing characteristics  [] unstable and unpredictable characteristics;   [x] Clinical decision making of [x] low, [] moderate, [] high complexity using standardized patient assessment instrument and/or measurable assessment of functional outcome. [x] EVAL (LOW) 64267 (typically 20 minutes face-to-face)  [] EVAL (MOD) 14473 (typically 30 minutes face-to-face)  [] EVAL (HIGH) 39407 (typically 45 minutes face-to-face)  [] RE-EVAL      PLAN:  Frequency/Duration:  1-2 days per week for 10 Weeks:    Interventions:  [x]  Therapeutic exercise including: strength training, ROM, for scapula, core and Upper extremity, including postural re-education. [x]  NMR activation and proprioception for UE, periscapular and RC muscles and Core, including postural re-education. [x]  Manual therapy as indicated for shoulder, scapula, spine and associated soft tissue including: Dry Needling/IASTM, STM, PROM, Gr I-IV mobilizations, manipulation. [x] Modalities as needed that may include: thermal agents, E-stim, Biofeedback, US, iontophoresis as indicated  [x] Patient education on joint protection, postural re-education, activity modification, progression of HEP. HEP instruction: Pt provided HEP via [employer]         GOALS:  Patient stated goal: Address shoulder pain and prevent surgery   [x] Progressing: [] Met: [] Not Met: [] Adjusted    Therapist goals for Patient:   Short Term Goals: To be achieved in: 2 weeks  1. Independent in HEP and progression per patient tolerance, in order to prevent re-injury. [x] Progressing: [] Met: [] Not Met: [] Adjusted  2. Patient will have a decrease in pain to facilitate improvement in movement, function, and ADLs as indicated by Functional Deficits. [x] Progressing: [] Met: [] Not Met: [] Adjusted    Long Term Goals:  To be achieved in: 10 weeks  1. UEFI of 15/80 or less  to assist with reaching prior level of function. [x] Progressing: [] Met: [] Not Met: [] Adjusted  2. Patient will demonstrate increased AROM to 90% to allow for proper joint functioning as indicated by Functional Deficits. [x] Progressing: [] Met: [] Not Met: [] Adjusted  3. Patient will demonstrate an increase in NM recruitment/activation and overall GH and scapular strength to 4+/5 or greater, for proper functional mobility as indicated by patients Functional Deficits. [x] Progressing: [] Met: [] Not Met: [] Adjusted  4. Patient will return to Functional activities without increased symptoms or restriction. [x] Progressing: [] Met: [] Not Met: [] Adjusted  5. Pt will return to all work/volunteer activities without increased symptoms or restriction.      [x] Progressing: [] Met: [] Not Met: [] Adjusted      Electronically signed by:  Isabel Lu PT

## 2021-10-06 NOTE — FLOWSHEET NOTE
Physical Therapy Daily Treatment Note    [x]Daily Tx Note    []Progress Note    [] Discharge Summary         Date:  10/6/2021    Patient Name:  Lacy Patel    :  1950  MRN: [id number]      Medical/Treatment Diagnosis Information:  ·  Rotator Cuff tendinitis M75.81  ·      Insurance/Certification information:   [address] Medicare Supp    Physician Information:   Ted Gaming DO    Plan of care signed :  []  Yes  [x] No  [x]  Cosign []  Fax    Date of Patient follow up with Physician:     Is this a Progress Report:     []  Yes  [x]  No      If Yes:  Date Range for reporting period:  Beginning 10/6/2021  Ending 12/15/2021    Progress report will be due (10 Rx or 30 days whichever is less):       Recertification will be due (POC Duration  / 90 days whichever is less):  2022      Visit # Insurance Allowable Auth Required    BMN []  Yes [x]  No        Functional Scale:  UEFI     Date  10/6/2021  Score 52/80    Latex Allergy:  [x]NO      []YES  Preferred Language for Healthcare:   [x]English       []other:    RESTRICTIONS/PRECAUTIONS:      SUBJECTIVE:  See eval    Pain level:  3/10      Plan Moving Forward/ For next visit:     Improve Scapular stability    Address posture deficits     Improve Rotator Cuff strength/function       OBJECTIVE: See eval        Exercises/Interventions:     Exercises in bold performed in department today. Items not bolded are carried forward from prior visits for continuity of the record.   Exercise/Equipment Resistance/Repetitions HEP Other comments     Scapular Squeezes 2x10 bilat [x]      ER isometric 2x10 bilat [x]      Rows w/ scap squeeze 2x10 bilat  [x] RTB       []        []        []        []        []        []          []         []        []        []        []        []        []        []        []      Therapeutic Exercise/Home Exercise Program:   25 minutes    Therapeutic Activity:  0 minutes     Gait: 0 minutes    Neuromuscular Re-Education:  0 minutes      Canalith Repositioning Procedure:      Manual Therapy:  0 minutes    Modalities: 0 minutes          ASSESSMENT:      Goals:   Patient stated goal: Address shoulder pain and prevent surgery   [x]? Progressing: []? Met: []? Not Met: []? Adjusted     Therapist goals for Patient:   Short Term Goals: To be achieved in: 2 weeks  1. Independent in HEP and progression per patient tolerance, in order to prevent re-injury. [x]? Progressing: []? Met: []? Not Met: []? Adjusted  2. Patient will have a decrease in pain to facilitate improvement in movement, function, and ADLs as indicated by Functional Deficits. [x]? Progressing: []? Met: []? Not Met: []? Adjusted     Long Term Goals: To be achieved in: 10 weeks  1. UEFI of 15/80 or less  to assist with reaching prior level of function. [x]? Progressing: []? Met: []? Not Met: []? Adjusted  2. Patient will demonstrate increased AROM to 90% to allow for proper joint functioning as indicated by Functional Deficits. [x]? Progressing: []? Met: []? Not Met: []? Adjusted  3. Patient will demonstrate an increase in NM recruitment/activation and overall GH and scapular strength to 4+/5 or greater, for proper functional mobility as indicated by patients Functional Deficits. [x]? Progressing: []? Met: []? Not Met: []? Adjusted  4. Patient will return to Functional activities without increased symptoms or restriction. [x]? Progressing: []? Met: []? Not Met: []? Adjusted  5. Pt will return to all work/volunteer activities without increased symptoms or restriction. [x]? Progressing: []? Met: []? Not Met: []? Adjusted      Overall Progression Towards Functional goals/ Treatment Progress Update:  [] Patient is progressing as expected towards functional goals listed. [] Progression is slowed due to complexities/Impairments listed. [] Progression has been slowed due to co-morbidities.   [x] Plan just implemented, too soon to assess goals progression <30days   [] Goals require adjustment due to lack of progress  [] Patient is not progressing as expected and requires additional follow up with physician  [] Other    Prognosis for POC: [x] Good [] Fair  [] Poor    Patient requires continued skilled intervention: [x] Yes  [] No    Treatment/Activity Tolerance:  [x] Patient able to complete treatment  [] Patient limited by fatigue  [] Patient limited by pain    [] Patient limited by other medical complications  [] Other:         PLAN: See eval  [] Continue per plan of care [] Alter current plan (see comments above)  [x] Plan of care initiated [] Hold pending MD visit [] Discharge        Therapeutic Exercise and NMR EXR  [x] (64871) Provided verbal/tactile cueing for activities related to strengthening, flexibility, endurance, ROM  for improvements in proximal strength and core control with self care, mobility, lifting and ambulation. [x] (62540) Provided verbal/tactile cueing for activities related to improving balance, coordination, kinesthetic sense, posture, motor skill, proprioception  to assist with core control in self care, mobility, lifting, and ambulation.      Therapeutic Activities and Gait:    [] (21092 or 28815) Provided verbal/tactile cueing for activities related to improving balance, coordination, kinesthetic sense, posture, motor skill, proprioception and motor activation to allow for proper function  with self care and ADLs  [] (52615) Provided training and instruction to the patient for proper core and proximal hip recruitment and positioning with ambulation re-education     Home Exercise Program:    [x] (46499) Reviewed/Progressed HEP activities related to strengthening, flexibility, endurance, ROM of core, proximal hip and LE for functional self-care, mobility, lifting and ambulation   [] (27925) Reviewed/Progressed HEP activities related to improving balance, coordination, kinesthetic sense, posture, motor skill, proprioception of core, proximal hip and LE for self care, mobility, lifting, and ambulation      Manual Treatments:  PROM / STM / Oscillations-Mobs:  G-I, II, III, IV (PA's, Inf., Post.)  [] (93917) Provided manual therapy to mobilize proximal hip and LS spine soft tissue/joints for the purpose of modulating pain, promoting relaxation,  increasing ROM, reducing/eliminating soft tissue swelling/inflammation/restriction, improving soft tissue extensibility and allowing for proper ROM for normal function with self care, mobility, lifting and ambulation. []CRP:  Canalith Repositioning procedure for the assessment, treatment and education of BPPV    Modalities:       Charges:  Timed Code Treatment Minutes: 45   Total Treatment Minutes: 45     Medicare Cap total YTD:        [x]N/A  Workers Comp Time Stamp  (Per CPT and Total Treatment) [x]N/A   Time In: 8:15   Time Out: 9:00     [x] EVAL    [] Dry Needling  [x] XF(41711)   x  2   [] EStim Unattended 54002  [] NMR (47531)  x     [] Estim Attended  07273  [] Manual (35689)  x      [] Mechanical Txn 57489  [] TA    x     [] Ultrasound  [] Gait   x  [] Vaso  [] CRP    [] Ionto           [] Other:        Electronically signed by: Douglas Fuchs PT,       Note: If patient does not return for scheduled/ recommended follow up visits, this note will serve as a discharge from care along with most recent update on progress.

## 2021-10-11 ENCOUNTER — HOSPITAL ENCOUNTER (OUTPATIENT)
Dept: PHYSICAL THERAPY | Age: 71
Setting detail: THERAPIES SERIES
Discharge: HOME | End: 2021-10-11
Payer: MEDICARE

## 2021-10-11 PROCEDURE — 97140 MANUAL THERAPY 1/> REGIONS: CPT

## 2021-10-11 PROCEDURE — 97110 THERAPEUTIC EXERCISES: CPT

## 2021-10-11 NOTE — FLOWSHEET NOTE
Physical Therapy Daily Treatment Note    [x]Daily Tx Note    []Progress Note    [] Discharge Summary         Date:  10/11/2021    Patient Name:  Rosi Ma    :  1950  MRN: [id number]      Medical/Treatment Diagnosis Information:  ·  Rotator Cuff tendinitis M75.81       Insurance/Certification information:   [address] Medicare Supp    Physician Information:   Tisha Place, DO    Plan of care signed :  []  Yes  [x] No  [x]  Cosign []  Fax    Date of Patient follow up with Physician:     Is this a Progress Report:     []  Yes  [x]  No      If Yes:  Date Range for reporting period:  Beginning 10/11/2021  Ending 12/15/2021    Progress report will be due (10 Rx or 30 days whichever is less):     3991  Recertification will be due (POC Duration  / 90 days whichever is less):  2022      Visit # Insurance Allowable Auth Required    BMN []  Yes [x]  No        Functional Scale:  UEFI     Date  10/6/2021  Score 52/80    Latex Allergy:  [x]NO      []YES  Preferred Language for Healthcare:   [x]English       []other:    RESTRICTIONS/PRECAUTIONS:      SUBJECTIVE:  See eval    Pain level:  3/10      Plan Moving Forward/ For next visit:     Improve Scapular stability    Address posture deficits     Improve Rotator Cuff strength/function       OBJECTIVE: See eval        Exercises/Interventions:     Exercises in bold performed in department today. Items not bolded are carried forward from prior visits for continuity of the record.   Exercise/Equipment Resistance/Repetitions HEP Other comments     Scapular Squeezes 2x10 bilat [x]      ER isometric 2x10 bilat [x]      Rows w/ scap squeeze 2x10 bilat  [x] RTB     Supraspinatus lifts  2x10 bilat [x] 2#       []        []        []      Manual  []      LS stretch   []        TrP to ER/SS/biceps tendon   []         []        []        []        []        []        []        []        []      Therapeutic Exercise/Home Exercise Program: 25 minutes    Therapeutic Activity:  0 minutes     Gait: 0 minutes    Neuromuscular Re-Education:  0 minutes      Canalith Repositioning Procedure:      Manual Therapy:  20 minutes    Modalities: 0 minutes          ASSESSMENT:  Pt presents w/ similar level of pain/function compared to start of session. Pt will cont to benefit from skilled PT services to improve function towards goals. Goals:   Patient stated goal: Address shoulder pain and prevent surgery   [x]? Progressing: []? Met: []? Not Met: []? Adjusted     Therapist goals for Patient:   Short Term Goals: To be achieved in: 2 weeks  1. Independent in HEP and progression per patient tolerance, in order to prevent re-injury. [x]? Progressing: []? Met: []? Not Met: []? Adjusted  2. Patient will have a decrease in pain to facilitate improvement in movement, function, and ADLs as indicated by Functional Deficits. [x]? Progressing: []? Met: []? Not Met: []? Adjusted     Long Term Goals: To be achieved in: 10 weeks  1. UEFI of 15/80 or less  to assist with reaching prior level of function. [x]? Progressing: []? Met: []? Not Met: []? Adjusted  2. Patient will demonstrate increased AROM to 90% to allow for proper joint functioning as indicated by Functional Deficits. [x]? Progressing: []? Met: []? Not Met: []? Adjusted  3. Patient will demonstrate an increase in NM recruitment/activation and overall GH and scapular strength to 4+/5 or greater, for proper functional mobility as indicated by patients Functional Deficits. [x]? Progressing: []? Met: []? Not Met: []? Adjusted  4. Patient will return to Functional activities without increased symptoms or restriction. [x]? Progressing: []? Met: []? Not Met: []? Adjusted  5. Pt will return to all work/volunteer activities without increased symptoms or restriction. [x]? Progressing: []? Met: []? Not Met: []?  Adjusted      Overall Progression Towards Functional goals/ Treatment Progress Update:  [] Patient is progressing as expected towards functional goals listed. [] Progression is slowed due to complexities/Impairments listed. [] Progression has been slowed due to co-morbidities. [x] Plan just implemented, too soon to assess goals progression <30days   [] Goals require adjustment due to lack of progress  [] Patient is not progressing as expected and requires additional follow up with physician  [] Other    Prognosis for POC: [x] Good [] Fair  [] Poor    Patient requires continued skilled intervention: [x] Yes  [] No    Treatment/Activity Tolerance:  [x] Patient able to complete treatment  [] Patient limited by fatigue  [] Patient limited by pain    [] Patient limited by other medical complications  [] Other:         PLAN: See eval  [] Continue per plan of care [] Alter current plan (see comments above)  [x] Plan of care initiated [] Hold pending MD visit [] Discharge        Therapeutic Exercise and NMR EXR  [x] (03271) Provided verbal/tactile cueing for activities related to strengthening, flexibility, endurance, ROM  for improvements in proximal strength and core control with self care, mobility, lifting and ambulation. [x] (13886) Provided verbal/tactile cueing for activities related to improving balance, coordination, kinesthetic sense, posture, motor skill, proprioception  to assist with core control in self care, mobility, lifting, and ambulation.      Therapeutic Activities and Gait:    [] (36015 or 63951) Provided verbal/tactile cueing for activities related to improving balance, coordination, kinesthetic sense, posture, motor skill, proprioception and motor activation to allow for proper function  with self care and ADLs  [] (88513) Provided training and instruction to the patient for proper core and proximal hip recruitment and positioning with ambulation re-education     Home Exercise Program:    [x] (23971) Reviewed/Progressed HEP activities related to strengthening, flexibility, endurance, ROM of core, proximal hip and LE for functional self-care, mobility, lifting and ambulation   [] (25339) Reviewed/Progressed HEP activities related to improving balance, coordination, kinesthetic sense, posture, motor skill, proprioception of core, proximal hip and LE for self care, mobility, lifting, and ambulation      Manual Treatments:  PROM / STM / Oscillations-Mobs:  G-I, II, III, IV (PA's, Inf., Post.)  [] (60392) Provided manual therapy to mobilize proximal hip and LS spine soft tissue/joints for the purpose of modulating pain, promoting relaxation,  increasing ROM, reducing/eliminating soft tissue swelling/inflammation/restriction, improving soft tissue extensibility and allowing for proper ROM for normal function with self care, mobility, lifting and ambulation. []CRP:  Canalith Repositioning procedure for the assessment, treatment and education of BPPV    Modalities:       Charges:  Timed Code Treatment Minutes: 45   Total Treatment Minutes: 45     Medicare Cap total YTD:        [x]N/A  Workers Comp Time Stamp  (Per CPT and Total Treatment) [x]N/A   Time In 12:00   Time Out: 12:45     [] EVAL    [] Dry Needling  [x] IB(03853)   x  2   [] EStim Unattended 31389  [] NMR (84796)  x     [] Estim Attended  33563  [x] Manual (53271)  x 1     [] Mechanical Txn 72144  [] TA    x     [] Ultrasound  [] Gait   x  [] Vaso  [] CRP    [] Ionto           [] Other:        Electronically signed by: Romayne Sieve, PT,       Note: If patient does not return for scheduled/ recommended follow up visits, this note will serve as a discharge from care along with most recent update on progress.

## 2021-10-13 ENCOUNTER — HOSPITAL ENCOUNTER (OUTPATIENT)
Dept: PHYSICAL THERAPY | Age: 71
Setting detail: THERAPIES SERIES
Discharge: HOME | End: 2021-10-13
Payer: MEDICARE

## 2021-10-13 PROCEDURE — 97110 THERAPEUTIC EXERCISES: CPT

## 2021-10-13 PROCEDURE — 97140 MANUAL THERAPY 1/> REGIONS: CPT

## 2021-10-13 NOTE — FLOWSHEET NOTE
Physical Therapy Daily Treatment Note    [x]Daily Tx Note    []Progress Note    [] Discharge Summary         Date:  10/13/2021    Patient Name:  Lacy Patel    :  1950  MRN: [id number]      Medical/Treatment Diagnosis Information:  ·  Rotator Cuff tendinitis M75.81       Insurance/Certification information:   [address] Medicare Supp    Physician Information:   Ted Gaming DO    Plan of care signed :  []  Yes  [x] No  [x]  Cosign []  Fax    Date of Patient follow up with Physician:     Is this a Progress Report:     []  Yes  [x]  No      If Yes:  Date Range for reporting period:  Beginning 10/13/2021  Ending 12/15/2021    Progress report will be due (10 Rx or 30 days whichever is less):       Recertification will be due (POC Duration  / 90 days whichever is less):  2022      Visit # Insurance Allowable Auth Required   3/ 20 BMN []  Yes [x]  No        Functional Scale:  UEFI     Date  10/6/2021  Score 52/80    Latex Allergy:  [x]NO      []YES  Preferred Language for Healthcare:   [x]English       []other:    RESTRICTIONS/PRECAUTIONS:      SUBJECTIVE:  Pt reports that he feels he is doing better in general and that his exercises are going well. Pain level:  3/10      Plan Moving Forward/ For next visit:     Improve Scapular stability    Address posture deficits     Improve Rotator Cuff strength/function       OBJECTIVE: See eval        Exercises/Interventions:     Exercises in bold performed in department today. Items not bolded are carried forward from prior visits for continuity of the record.   Exercise/Equipment Resistance/Repetitions HEP Other comments     Scapular Squeezes 2x10 bilat [x] \"down and back cue\"     ER/IR 2x10 bilat [x] RTB     Rows w/ scap squeeze 2x10 bilat  [x] RTB     Supraspinatus lifts  2x10 bilat [x] 2#   [x]      SA punches 2x10 bilat [x]        []      Manual  []      LS and UT stretch   [x]        TrP to ER/SS/biceps tendon   [] []        []        []        []        []        []        []        []      Therapeutic Exercise/Home Exercise Program:   30 minutes    Therapeutic Activity:  0 minutes     Gait: 0 minutes    Neuromuscular Re-Education:  0 minutes      Canalith Repositioning Procedure:      Manual Therapy:  15 minutes    Modalities: 0 minutes          ASSESSMENT:  Pt presents w/ similar level of pain/function compared to start of session. Pt will cont to benefit from skilled PT services to improve function towards goals. Goals:   Patient stated goal: Address shoulder pain and prevent surgery   [x]? Progressing: []? Met: []? Not Met: []? Adjusted     Therapist goals for Patient:   Short Term Goals: To be achieved in: 2 weeks  1. Independent in HEP and progression per patient tolerance, in order to prevent re-injury. [x]? Progressing: []? Met: []? Not Met: []? Adjusted  2. Patient will have a decrease in pain to facilitate improvement in movement, function, and ADLs as indicated by Functional Deficits. [x]? Progressing: []? Met: []? Not Met: []? Adjusted     Long Term Goals: To be achieved in: 10 weeks  1. UEFI of 15/80 or less  to assist with reaching prior level of function. [x]? Progressing: []? Met: []? Not Met: []? Adjusted  2. Patient will demonstrate increased AROM to 90% to allow for proper joint functioning as indicated by Functional Deficits. [x]? Progressing: []? Met: []? Not Met: []? Adjusted  3. Patient will demonstrate an increase in NM recruitment/activation and overall GH and scapular strength to 4+/5 or greater, for proper functional mobility as indicated by patients Functional Deficits. [x]? Progressing: []? Met: []? Not Met: []? Adjusted  4. Patient will return to Functional activities without increased symptoms or restriction. [x]? Progressing: []? Met: []? Not Met: []? Adjusted  5. Pt will return to all work/volunteer activities without increased symptoms or restriction. [x]? Progressing: []? Met: []? Not Met: []? Adjusted      Overall Progression Towards Functional goals/ Treatment Progress Update:  [] Patient is progressing as expected towards functional goals listed. [] Progression is slowed due to complexities/Impairments listed. [] Progression has been slowed due to co-morbidities. [x] Plan just implemented, too soon to assess goals progression <30days   [] Goals require adjustment due to lack of progress  [] Patient is not progressing as expected and requires additional follow up with physician  [] Other    Prognosis for POC: [x] Good [] Fair  [] Poor    Patient requires continued skilled intervention: [x] Yes  [] No    Treatment/Activity Tolerance:  [x] Patient able to complete treatment  [] Patient limited by fatigue  [] Patient limited by pain    [] Patient limited by other medical complications  [] Other:         PLAN: See eval  [] Continue per plan of care [] Alter current plan (see comments above)  [x] Plan of care initiated [] Hold pending MD visit [] Discharge        Therapeutic Exercise and NMR EXR  [x] (77149) Provided verbal/tactile cueing for activities related to strengthening, flexibility, endurance, ROM  for improvements in proximal strength and core control with self care, mobility, lifting and ambulation. [x] (06632) Provided verbal/tactile cueing for activities related to improving balance, coordination, kinesthetic sense, posture, motor skill, proprioception  to assist with core control in self care, mobility, lifting, and ambulation.      Therapeutic Activities and Gait:    [] (31522 or 37731) Provided verbal/tactile cueing for activities related to improving balance, coordination, kinesthetic sense, posture, motor skill, proprioception and motor activation to allow for proper function  with self care and ADLs  [] (23055) Provided training and instruction to the patient for proper core and proximal hip recruitment and positioning with ambulation

## 2021-10-18 ENCOUNTER — HOSPITAL ENCOUNTER (OUTPATIENT)
Dept: PHYSICAL THERAPY | Age: 71
Setting detail: THERAPIES SERIES
Discharge: HOME | End: 2021-10-18
Payer: MEDICARE

## 2021-10-18 PROCEDURE — 97110 THERAPEUTIC EXERCISES: CPT

## 2021-10-18 NOTE — FLOWSHEET NOTE
Physical Therapy Daily Treatment Note    [x]Daily Tx Note    []Progress Note    [] Discharge Summary         Date:  10/18/2021    Patient Name:  Najma Diamond    :  1950  MRN: [id number]      Medical/Treatment Diagnosis Information:  ·  Rotator Cuff tendinitis M75.81       Insurance/Certification information:   [address] Medicare Supp    Physician Information:   Yesika Russell DO    Plan of care signed :  []  Yes  [x] No  [x]  Cosign []  Fax    Date of Patient follow up with Physician:     Is this a Progress Report:     []  Yes  [x]  No      If Yes:  Date Range for reporting period:  Beginning 10/18/2021  Ending 12/15/2021    Progress report will be due (10 Rx or 30 days whichever is less):     9875  Recertification will be due (POC Duration  / 90 days whichever is less):  2022      Visit # Insurance Allowable Auth Required    BMN []  Yes [x]  No        Functional Scale:  UEFI     Date  10/6/2021  Score 52/80    Latex Allergy:  [x]NO      []YES  Preferred Language for Healthcare:   [x]English       []other:    RESTRICTIONS/PRECAUTIONS:      SUBJECTIVE:  Pt reports that he feels he is doing better in general and that his exercises are going well. He states that he got a new dog and has been walking him around a lot, which increased shoulder pain. Pt reports that pain continues to inc/dec randomly. Pain level:  3/10      Plan Moving Forward/ For next visit:     Improve Scapular stability    Address posture deficits     Improve Rotator Cuff strength/function       OBJECTIVE: See eval        Exercises/Interventions:     Exercises in bold performed in department today. Items not bolded are carried forward from prior visits for continuity of the record.   Exercise/Equipment Resistance/Repetitions HEP Other comments     Scapular Squeezes 2x10 bilat [x] \"down and back cue\"     ER/IR 2x10 bilat [x] RTB for IR YTB for ER     Rows w/ scap squeeze 2x10 bilat  [x] GTB Supraspinatus lifts  2x10 bilat [x] 2#     Eccentric curls 2x10 bilat [x] 1#     SA punches 2x10 bilat [x] 1#       []      []      LS and UT stretch  3x30 sec ea [x]      []         []        []        []        []        []        []        []        []      Therapeutic Exercise/Home Exercise Program:   45 minutes    Therapeutic Activity:  0 minutes     Gait: 0 minutes    Neuromuscular Re-Education:  0 minutes      Canalith Repositioning Procedure:      Manual Therapy:  0 minutes    Modalities: 0 minutes    HEP:   Access Code: [email or web address]  URL: [email or web address]  Date: 10/18/2021      Exercises  Seated Upper Trapezius Stretch - 1 x daily - 7 x weekly - 3 sets - 30 hold  Seated Levator Scapulae Stretch - 1 x daily - 7 x weekly - 3 sets - 30 hold  Seated Scapular Retraction - 1 x daily - 7 x weekly - 3 sets - 10 reps - 5 hold  Shoulder External Rotation with Anchored Resistance - 1 x daily - 7 x weekly - 3 sets - 10 reps  Standing Shoulder Internal Rotation with Anchored Resistance - 1 x daily - 7 x weekly - 3 sets - 10 reps  Standing Shoulder Row with Anchored Resistance - 1 x daily - 7 x weekly - 3 sets - 10 reps - green band resistance  Standing Single Arm Eccentric Bicep Curl Pronated then Supinated - 1 x daily - 7 x weekly - 3 sets - 10 reps       ASSESSMENT:  Pt presents w/ similar level of pain/function compared to previous session. He reports that he was unable to see videos on email, so had to do exercises from memory. Focus of session was to review and alter home program as needed. Pt will cont to benefit from skilled PT services to improve function towards goals. Goals:   Patient stated goal: Address shoulder pain and prevent surgery   [x]? Progressing: []? Met: []? Not Met: []? Adjusted     Therapist goals for Patient:   Short Term Goals: To be achieved in: 2 weeks  1. Independent in HEP and progression per patient tolerance, in order to prevent re-injury. [x]? Progressing: []? Met: []?  Not Met: []? Adjusted  2. Patient will have a decrease in pain to facilitate improvement in movement, function, and ADLs as indicated by Functional Deficits. [x]? Progressing: []? Met: []? Not Met: []? Adjusted     Long Term Goals: To be achieved in: 10 weeks  1. UEFI of 15/80 or less  to assist with reaching prior level of function. [x]? Progressing: []? Met: []? Not Met: []? Adjusted  2. Patient will demonstrate increased AROM to 90% to allow for proper joint functioning as indicated by Functional Deficits. [x]? Progressing: []? Met: []? Not Met: []? Adjusted  3. Patient will demonstrate an increase in NM recruitment/activation and overall GH and scapular strength to 4+/5 or greater, for proper functional mobility as indicated by patients Functional Deficits. [x]? Progressing: []? Met: []? Not Met: []? Adjusted  4. Patient will return to Functional activities without increased symptoms or restriction. [x]? Progressing: []? Met: []? Not Met: []? Adjusted  5. Pt will return to all work/volunteer activities without increased symptoms or restriction. [x]? Progressing: []? Met: []? Not Met: []? Adjusted      Overall Progression Towards Functional goals/ Treatment Progress Update:  [] Patient is progressing as expected towards functional goals listed. [] Progression is slowed due to complexities/Impairments listed. [] Progression has been slowed due to co-morbidities.   [x] Plan just implemented, too soon to assess goals progression <30days   [] Goals require adjustment due to lack of progress  [] Patient is not progressing as expected and requires additional follow up with physician  [] Other    Prognosis for POC: [x] Good [] Fair  [] Poor    Patient requires continued skilled intervention: [x] Yes  [] No    Treatment/Activity Tolerance:  [x] Patient able to complete treatment  [] Patient limited by fatigue  [] Patient limited by pain    [] Patient limited by other medical complications  [] Other:

## 2021-10-20 ENCOUNTER — HOSPITAL ENCOUNTER (OUTPATIENT)
Dept: PHYSICAL THERAPY | Age: 71
Setting detail: THERAPIES SERIES
Discharge: HOME | End: 2021-10-20
Payer: MEDICARE

## 2021-10-20 PROCEDURE — 97110 THERAPEUTIC EXERCISES: CPT

## 2021-10-20 PROCEDURE — 97112 NEUROMUSCULAR REEDUCATION: CPT

## 2021-10-20 NOTE — FLOWSHEET NOTE
Physical Therapy Daily Treatment Note    [x]Daily Tx Note    []Progress Note    [] Discharge Summary         Date:  10/20/2021    Patient Name:  Gennaro Penn    :  1950  MRN: [id number]      Medical/Treatment Diagnosis Information:  ·  Rotator Cuff tendinitis M75.81       Insurance/Certification information:   [address] Medicare Supp    Physician Information:   Selene Gunter DO    Plan of care signed :  []  Yes  [x] No  [x]  Cosign []  Fax    Date of Patient follow up with Physician:     Is this a Progress Report:     []  Yes  [x]  No      If Yes:  Date Range for reporting period:  Beginning 10/20/2021  Ending 12/15/2021    Progress report will be due (10 Rx or 30 days whichever is less):     8123  Recertification will be due (POC Duration  / 90 days whichever is less):  2022      Visit # Insurance Allowable Auth Required    BMN []  Yes [x]  No        Functional Scale:  UEFI     Date  10/6/2021  Score 52/80    Latex Allergy:  [x]NO      []YES  Preferred Language for Healthcare:   [x]English       []other:    RESTRICTIONS/PRECAUTIONS:      SUBJECTIVE: Pt reports that he is feeling an improvement in symptoms and states he feels good w/ exercises    Pain level:  1-2/10      Plan Moving Forward/ For next visit:     Improve Scapular stability    Address posture deficits     Improve Rotator Cuff strength/function       OBJECTIVE: See eval        Exercises/Interventions:     Exercises in bold performed in department today. Items not bolded are carried forward from prior visits for continuity of the record.   Exercise/Equipment Resistance/Repetitions HEP Other comments     Scapular Squeezes 2x10 bilat [x] \"down and back cue\"     ER/IR 2x10 bilat [x] RTB for IR YTB for ER   Pulldowns  2x10 bilat  GTB     Rows w/ scap squeeze 2x10 bilat  [x] GTB     Supraspinatus lifts  2x10 bilat [x] 2#     Eccentric curls 2x10 bilat [x] 1#   Wall ball push ABCs2 ABCs bilat  UE bike  2 mins []    Bilat UE PNF   []      LS and UT stretch  3x30 sec ea [x]      []         []        []        []        []        []        []        []        []      Therapeutic Exercise/Home Exercise Program:   30 minutes    Therapeutic Activity:  0 minutes     Gait: 0 minutes    Neuromuscular Re-Education:  15 minutes      Canalith Repositioning Procedure:      Manual Therapy:  0 minutes    Modalities: 0 minutes    HEP:   Access Code: [email or web address]  URL: [email or web address]  Date: 10/18/2021      Exercises  Seated Upper Trapezius Stretch - 1 x daily - 7 x weekly - 3 sets - 30 hold  Seated Levator Scapulae Stretch - 1 x daily - 7 x weekly - 3 sets - 30 hold  Seated Scapular Retraction - 1 x daily - 7 x weekly - 3 sets - 10 reps - 5 hold  Shoulder External Rotation with Anchored Resistance - 1 x daily - 7 x weekly - 3 sets - 10 reps  Standing Shoulder Internal Rotation with Anchored Resistance - 1 x daily - 7 x weekly - 3 sets - 10 reps  Standing Shoulder Row with Anchored Resistance - 1 x daily - 7 x weekly - 3 sets - 10 reps - green band resistance  Standing Single Arm Eccentric Bicep Curl Supinated - 1 x daily - 7 x weekly - 3 sets - 10 reps       ASSESSMENT:  Pt presents w/ improved functional ROM and decrease in symptoms compared to previous sessions. Pt tolerated all new exercises well, but will benefit from same HEP d/t recent changes in last session. Pt will cont to benefit from skilled PT services to improve function towards goals. Goals:   Patient stated goal: Address shoulder pain and prevent surgery   [x]? Progressing: []? Met: []? Not Met: []? Adjusted     Therapist goals for Patient:   Short Term Goals: To be achieved in: 2 weeks  1. Independent in HEP and progression per patient tolerance, in order to prevent re-injury. [x]? Progressing: []? Met: []? Not Met: []? Adjusted  2.  Patient will have a decrease in pain to facilitate improvement in movement, function, and ADLs as indicated by Functional Deficits. [x]? Progressing: []? Met: []? Not Met: []? Adjusted     Long Term Goals: To be achieved in: 10 weeks  1. UEFI of 15/80 or less  to assist with reaching prior level of function. [x]? Progressing: []? Met: []? Not Met: []? Adjusted  2. Patient will demonstrate increased AROM to 90% to allow for proper joint functioning as indicated by Functional Deficits. [x]? Progressing: []? Met: []? Not Met: []? Adjusted  3. Patient will demonstrate an increase in NM recruitment/activation and overall GH and scapular strength to 4+/5 or greater, for proper functional mobility as indicated by patients Functional Deficits. [x]? Progressing: []? Met: []? Not Met: []? Adjusted  4. Patient will return to Functional activities without increased symptoms or restriction. [x]? Progressing: []? Met: []? Not Met: []? Adjusted  5. Pt will return to all work/volunteer activities without increased symptoms or restriction. [x]? Progressing: []? Met: []? Not Met: []? Adjusted      Overall Progression Towards Functional goals/ Treatment Progress Update:  [] Patient is progressing as expected towards functional goals listed. [] Progression is slowed due to complexities/Impairments listed. [] Progression has been slowed due to co-morbidities.   [x] Plan just implemented, too soon to assess goals progression <30days   [] Goals require adjustment due to lack of progress  [] Patient is not progressing as expected and requires additional follow up with physician  [] Other    Prognosis for POC: [x] Good [] Fair  [] Poor    Patient requires continued skilled intervention: [x] Yes  [] No    Treatment/Activity Tolerance:  [x] Patient able to complete treatment  [] Patient limited by fatigue  [] Patient limited by pain    [] Patient limited by other medical complications  [] Other:         PLAN: See eval  [] Continue per plan of care [] Alter current plan (see comments above)  [x] Plan of care initiated [] Hold pending MD visit [] Discharge        Therapeutic Exercise and NMR EXR  [x] (07879) Provided verbal/tactile cueing for activities related to strengthening, flexibility, endurance, ROM  for improvements in proximal strength and core control with self care, mobility, lifting and ambulation. [x] (81014) Provided verbal/tactile cueing for activities related to improving balance, coordination, kinesthetic sense, posture, motor skill, proprioception  to assist with core control in self care, mobility, lifting, and ambulation. Therapeutic Activities and Gait:    [] (90861 or 07868) Provided verbal/tactile cueing for activities related to improving balance, coordination, kinesthetic sense, posture, motor skill, proprioception and motor activation to allow for proper function  with self care and ADLs  [] (40687) Provided training and instruction to the patient for proper core and proximal hip recruitment and positioning with ambulation re-education     Home Exercise Program:    [x] (88998) Reviewed/Progressed HEP activities related to strengthening, flexibility, endurance, ROM of core, proximal hip and LE for functional self-care, mobility, lifting and ambulation   [] (27463) Reviewed/Progressed HEP activities related to improving balance, coordination, kinesthetic sense, posture, motor skill, proprioception of core, proximal hip and LE for self care, mobility, lifting, and ambulation      Manual Treatments:  PROM / STM / Oscillations-Mobs:  G-I, II, III, IV (PA's, Inf., Post.)  [x] (11914) Provided manual therapy to mobilize proximal hip and LS spine soft tissue/joints for the purpose of modulating pain, promoting relaxation,  increasing ROM, reducing/eliminating soft tissue swelling/inflammation/restriction, improving soft tissue extensibility and allowing for proper ROM for normal function with self care, mobility, lifting and ambulation.      []CRP:  Canalith Repositioning procedure for the assessment, treatment and education of BPPV    Modalities:       Charges:  Timed Code Treatment Minutes: 45   Total Treatment Minutes: 45     Medicare Cap total YTD:        [x]N/A  Workers Comp Time Stamp  (Per CPT and Total Treatment) [x]N/A   Time In 9:00 Time Out: 9:45     [] EVAL    [] Dry Needling  [x] WR(37861)   x  2  [] EStim Unattended 26827  [x] NMR (11703)  x   1  [] Estim Attended  06643  [] Manual (66239)  x    [] Mechanical Txn 58176  [] TA    x     [] Ultrasound  [] Gait   x  [] Vaso  [] CRP    [] Ionto           [] Other:        Electronically signed by: Renetta Mcallister PT,       Note: If patient does not return for scheduled/ recommended follow up visits, this note will serve as a discharge from care along with most recent update on progress.

## 2021-10-25 ENCOUNTER — HOSPITAL ENCOUNTER (OUTPATIENT)
Dept: PHYSICAL THERAPY | Age: 71
Setting detail: THERAPIES SERIES
Discharge: HOME | End: 2021-10-25
Payer: MEDICARE

## 2021-10-25 PROCEDURE — 97110 THERAPEUTIC EXERCISES: CPT

## 2021-10-25 PROCEDURE — 97112 NEUROMUSCULAR REEDUCATION: CPT

## 2021-10-25 NOTE — FLOWSHEET NOTE
Physical Therapy Daily Treatment Note    [x]Daily Tx Note    []Progress Note    [] Discharge Summary         Date:  10/25/2021    Patient Name:  Nell Cao    :  1950  MRN: [id number]      Medical/Treatment Diagnosis Information:  ·  Rotator Cuff tendinitis M75.81       Insurance/Certification information:   [address] Medicare Supp    Physician Information:   Humble Mckeon DO    Plan of care signed :  []  Yes  [x] No  [x]  Cosign []  Fax    Date of Patient follow up with Physician:     Is this a Progress Report:     []  Yes  [x]  No      If Yes:  Date Range for reporting period:  Beginning 10/25/2021  Ending 12/15/2021    Progress report will be due (10 Rx or 30 days whichever is less):       Recertification will be due (POC Duration  / 90 days whichever is less):  2022      Visit # Insurance Allowable Auth Required    BMN []  Yes [x]  No        Functional Scale:  UEFI     Date  10/6/2021  Score 52/80    Latex Allergy:  [x]NO      []YES  Preferred Language for Healthcare:   [x]English       []other:    RESTRICTIONS/PRECAUTIONS:      SUBJECTIVE: Pt reports that he is feeling an improvement in symptoms and states he feels good w/ exercises. He does report continued soreness in R shoulder mm w/ current exercises    Pain level:  1-2/10      Plan Moving Forward/ For next visit:     Improve Scapular stability    Address posture deficits     Improve Rotator Cuff strength/function       OBJECTIVE: See eval        Exercises/Interventions:     Exercises in bold performed in department today. Items not bolded are carried forward from prior visits for continuity of the record.   Exercise/Equipment Resistance/Repetitions HEP Other comments     Scapular Squeezes 2x10 bilat [x] \"down and back cue\"     ER/IR 2x10 bilat [x] RTB    Pulldowns  2x10 bilat  GTB     Rows w/ scap squeeze 2x10 bilat  [x] GTB     Supraspinatus lifts  2x10 bilat [x] 2#     Eccentric curls 2x10 bilat [x] 1#   Wall ball push ABCs2 ABCs bilat  UE bike  2 mins []    Bilat UE PNF   []      LS and UT stretch  3x30 sec ea [x]      []         []        []        []        []        []        []        []        []      Therapeutic Exercise/Home Exercise Program:   30 minutes    Therapeutic Activity:  0 minutes     Gait: 0 minutes    Neuromuscular Re-Education:  15 minutes      Canalith Repositioning Procedure:      Manual Therapy:  0 minutes    Modalities: 0 minutes    HEP:   Access Code: [email or web address]  URL: [email or web address]  Date: 10/18/2021      Exercises  Seated Upper Trapezius Stretch - 1 x daily - 7 x weekly - 3 sets - 30 hold  Seated Levator Scapulae Stretch - 1 x daily - 7 x weekly - 3 sets - 30 hold  Seated Scapular Retraction - 1 x daily - 7 x weekly - 3 sets - 10 reps - 5 hold  Shoulder External Rotation with Anchored Resistance - 1 x daily - 7 x weekly - 3 sets - 10 reps  Standing Shoulder Internal Rotation with Anchored Resistance - 1 x daily - 7 x weekly - 3 sets - 10 reps  Standing Shoulder Row with Anchored Resistance - 1 x daily - 7 x weekly - 3 sets - 10 reps - green band resistance  Standing Single Arm Eccentric Bicep Curl Supinated - 1 x daily - 7 x weekly - 3 sets - 10 reps       ASSESSMENT:  Pt presents w/ improved functional ROM and decrease in symptoms compared to previous sessions. Pt tolerated all new exercises well, but will benefit from same HEP d/t continued soreness. Pt reports he prefers to continue coming in for session 2x/week because it ensures proper form. Pt will cont to benefit from skilled PT services to improve function towards goals. Goals:   Patient stated goal: Address shoulder pain and prevent surgery   [x]? Progressing: []? Met: []? Not Met: []? Adjusted     Therapist goals for Patient:   Short Term Goals: To be achieved in: 2 weeks  1. Independent in HEP and progression per patient tolerance, in order to prevent re-injury. [x]? Progressing: []? Met: []?  Not Met: []? Adjusted  2. Patient will have a decrease in pain to facilitate improvement in movement, function, and ADLs as indicated by Functional Deficits. [x]? Progressing: []? Met: []? Not Met: []? Adjusted     Long Term Goals: To be achieved in: 10 weeks  1. UEFI of 15/80 or less  to assist with reaching prior level of function. [x]? Progressing: []? Met: []? Not Met: []? Adjusted  2. Patient will demonstrate increased AROM to 90% to allow for proper joint functioning as indicated by Functional Deficits. [x]? Progressing: []? Met: []? Not Met: []? Adjusted  3. Patient will demonstrate an increase in NM recruitment/activation and overall GH and scapular strength to 4+/5 or greater, for proper functional mobility as indicated by patients Functional Deficits. [x]? Progressing: []? Met: []? Not Met: []? Adjusted  4. Patient will return to Functional activities without increased symptoms or restriction. [x]? Progressing: []? Met: []? Not Met: []? Adjusted  5. Pt will return to all work/volunteer activities without increased symptoms or restriction. [x]? Progressing: []? Met: []? Not Met: []? Adjusted      Overall Progression Towards Functional goals/ Treatment Progress Update:  [] Patient is progressing as expected towards functional goals listed. [] Progression is slowed due to complexities/Impairments listed. [] Progression has been slowed due to co-morbidities.   [x] Plan just implemented, too soon to assess goals progression <30days   [] Goals require adjustment due to lack of progress  [] Patient is not progressing as expected and requires additional follow up with physician  [] Other    Prognosis for POC: [x] Good [] Fair  [] Poor    Patient requires continued skilled intervention: [x] Yes  [] No    Treatment/Activity Tolerance:  [x] Patient able to complete treatment  [] Patient limited by fatigue  [] Patient limited by pain    [] Patient limited by other medical complications  [] Other: PLAN: See eval  [] Continue per plan of care [] Alter current plan (see comments above)  [x] Plan of care initiated [] Hold pending MD visit [] Discharge        Therapeutic Exercise and NMR EXR  [x] (21603) Provided verbal/tactile cueing for activities related to strengthening, flexibility, endurance, ROM  for improvements in proximal strength and core control with self care, mobility, lifting and ambulation. [x] (02268) Provided verbal/tactile cueing for activities related to improving balance, coordination, kinesthetic sense, posture, motor skill, proprioception  to assist with core control in self care, mobility, lifting, and ambulation.      Therapeutic Activities and Gait:    [] (74612 or 83368) Provided verbal/tactile cueing for activities related to improving balance, coordination, kinesthetic sense, posture, motor skill, proprioception and motor activation to allow for proper function  with self care and ADLs  [] (97872) Provided training and instruction to the patient for proper core and proximal hip recruitment and positioning with ambulation re-education     Home Exercise Program:    [x] (29203) Reviewed/Progressed HEP activities related to strengthening, flexibility, endurance, ROM of core, proximal hip and LE for functional self-care, mobility, lifting and ambulation   [] (26334) Reviewed/Progressed HEP activities related to improving balance, coordination, kinesthetic sense, posture, motor skill, proprioception of core, proximal hip and LE for self care, mobility, lifting, and ambulation      Manual Treatments:  PROM / STM / Oscillations-Mobs:  G-I, II, III, IV (PA's, Inf., Post.)  [x] (53927) Provided manual therapy to mobilize proximal hip and LS spine soft tissue/joints for the purpose of modulating pain, promoting relaxation,  increasing ROM, reducing/eliminating soft tissue swelling/inflammation/restriction, improving soft tissue extensibility and allowing for proper ROM for normal function with self care, mobility, lifting and ambulation. []CRP:  Canalith Repositioning procedure for the assessment, treatment and education of BPPV    Modalities:       Charges:  Timed Code Treatment Minutes: 45   Total Treatment Minutes: 45     Medicare Cap total YTD:        [x]N/A  Workers Comp Time Stamp  (Per CPT and Total Treatment) [x]N/A   Time In 9:01 Time Out: 9:46     [] EVAL    [] Dry Needling  [x] BF(00816)   x  2  [] EStim Unattended 30115  [x] NMR (19876)  x   1  [] Estim Attended  14416  [] Manual (74853)  x    [] Mechanical Txn 34146  [] TA    x     [] Ultrasound  [] Gait   x  [] Vaso  [] CRP    [] Ionto           [] Other:        Electronically signed by: Pierre Olea PT,       Note: If patient does not return for scheduled/ recommended follow up visits, this note will serve as a discharge from care along with most recent update on progress.

## 2021-10-27 ENCOUNTER — HOSPITAL ENCOUNTER (OUTPATIENT)
Dept: PHYSICAL THERAPY | Age: 71
Setting detail: THERAPIES SERIES
Discharge: HOME | End: 2021-10-27
Payer: MEDICARE

## 2021-10-27 PROCEDURE — 97140 MANUAL THERAPY 1/> REGIONS: CPT

## 2021-10-27 PROCEDURE — 97110 THERAPEUTIC EXERCISES: CPT

## 2021-10-27 NOTE — FLOWSHEET NOTE
Physical Therapy Daily Treatment Note    [x]Daily Tx Note    []Progress Note    [] Discharge Summary         Date:  10/27/2021    Patient Name:  Gama Kelley    :  1950  MRN: [id number]      Medical/Treatment Diagnosis Information:  ·  Rotator Cuff tendinitis M75.81       Insurance/Certification information:   [address] Medicare Supp    Physician Information:   Emilee Resendiz DO    Plan of care signed :  []  Yes  [x] No  [x]  Cosign []  Fax    Date of Patient follow up with Physician:     Is this a Progress Report:     []  Yes  [x]  No      If Yes:  Date Range for reporting period:  Beginning 10/27/2021  Ending 12/15/2021    Progress report will be due (10 Rx or 30 days whichever is less):       Recertification will be due (POC Duration  / 90 days whichever is less):  2022      Visit # Insurance Allowable Auth Required    BMN []  Yes [x]  No        Functional Scale:  UEFI     Date  10/6/2021  Score 52/80    Latex Allergy:  [x]NO      []YES  Preferred Language for Healthcare:   [x]English       []other:    RESTRICTIONS/PRECAUTIONS:      SUBJECTIVE: Pt reports that he is feeling soreness in front and top of shoulder. Pt reports that he is doing exercises everyday and now walking dog . 5-1 mile per day, which is a significant increase to what he was doing prior. Pain level:  1-2/10      Plan Moving Forward/ For next visit:     Improve Scapular stability    Address posture deficits     Improve Rotator Cuff strength/function       OBJECTIVE: See eval        Exercises/Interventions:     Exercises in bold performed in department today. Items not bolded are carried forward from prior visits for continuity of the record.   Exercise/Equipment Resistance/Repetitions HEP Other comments     Scapular Squeezes 2x10 bilat [x] \"down and back cue\"     Rows w/ scap squeeze 2x10 bilat  [x] GTB     Supraspinatus lifts  2x10 bilat [x] 2#     Eccentric curls 2x10 bilat [x]    Wall ball push ABCs2 ABCs bilat[]    Bilat UE PNF   []      LS and UT stretch  3x30 sec ea [x]        TrP to ER/SS/biceps tendon   []         []        []        []        []        []        []        []        []      Therapeutic Exercise/Home Exercise Program:   30 minutes    Therapeutic Activity:  0 minutes     Gait: 0 minutes    Neuromuscular Re-Education:  0 minutes      Canalith Repositioning Procedure:      Manual Therapy:  15 minutes    Modalities: 0 minutes    HEP:   Access Code: [email or web address]  URL: [email or web address]  Date: 10/18/2021      Exercises  Seated Upper Trapezius Stretch - 1 x daily - 4 x weekly - 3 sets - 30 hold  Seated Levator Scapulae Stretch - 1 x daily - 4 x weekly - 3 sets - 30 hold  Seated Scapular Retraction - 1 x daily - 4 x weekly - 3 sets - 10 reps - 5 hold  Shoulder External Rotation with Anchored Resistance - 1 x daily - 4 x weekly - 3 sets - 10 reps  Standing Shoulder Internal Rotation with Anchored Resistance - 1 x daily - 4 x weekly - 3 sets - 10 reps  Standing Shoulder Row with Anchored Resistance - 1 x daily - 4 x weekly - 3 sets - 10 reps - green band resistance  Standing Single Arm Eccentric Bicep Curl Supinated - 1 x daily - 4 x weekly - 3 sets - 10 reps       ASSESSMENT:  Pt presents w/ improved functional ROM and decrease in symptoms after manual and there-ex interventions. Pt tolerated all exercises well, but will benefit from same HEP d/t continued soreness. Pt reports he prefers to continue coming in for session 2x/week because it ensures proper form. Pt will cont to benefit from skilled PT services to improve function towards goals. Pt educated on changing HEP to every other day vs daily d/t increase in general activity and possibility of exercises being too challenging for everyday frq. Goals:   Patient stated goal: Address shoulder pain and prevent surgery   [x]? Progressing: []? Met: []? Not Met: []?  Adjusted     Therapist goals for Patient:   Short Term Goals: Patient able to complete treatment  [] Patient limited by fatigue  [] Patient limited by pain    [] Patient limited by other medical complications  [] Other:         PLAN: See eval  [] Continue per plan of care [] Alter current plan (see comments above)  [x] Plan of care initiated [] Hold pending MD visit [] Discharge        Therapeutic Exercise and NMR EXR  [x] (29891) Provided verbal/tactile cueing for activities related to strengthening, flexibility, endurance, ROM  for improvements in proximal strength and core control with self care, mobility, lifting and ambulation. [x] (02533) Provided verbal/tactile cueing for activities related to improving balance, coordination, kinesthetic sense, posture, motor skill, proprioception  to assist with core control in self care, mobility, lifting, and ambulation.      Therapeutic Activities and Gait:    [] (15556 or 81376) Provided verbal/tactile cueing for activities related to improving balance, coordination, kinesthetic sense, posture, motor skill, proprioception and motor activation to allow for proper function  with self care and ADLs  [] (12976) Provided training and instruction to the patient for proper core and proximal hip recruitment and positioning with ambulation re-education     Home Exercise Program:    [x] (47228) Reviewed/Progressed HEP activities related to strengthening, flexibility, endurance, ROM of core, proximal hip and LE for functional self-care, mobility, lifting and ambulation   [] (68556) Reviewed/Progressed HEP activities related to improving balance, coordination, kinesthetic sense, posture, motor skill, proprioception of core, proximal hip and LE for self care, mobility, lifting, and ambulation      Manual Treatments:  PROM / STM / Oscillations-Mobs:  G-I, II, III, IV (PA's, Inf., Post.)  [x] (60488) Provided manual therapy to mobilize proximal hip and LS spine soft tissue/joints for the purpose of modulating pain, promoting relaxation, increasing ROM, reducing/eliminating soft tissue swelling/inflammation/restriction, improving soft tissue extensibility and allowing for proper ROM for normal function with self care, mobility, lifting and ambulation. []CRP:  Canalith Repositioning procedure for the assessment, treatment and education of BPPV    Modalities:       Charges:  Timed Code Treatment Minutes: 45   Total Treatment Minutes: 45     Medicare Cap total YTD:        [x]N/A  Workers Comp Time Stamp  (Per CPT and Total Treatment) [x]N/A   Time In 9:01 Time Out: 9:46     [] EVAL    [] Dry Needling  [x] GP(32819)   x  2  [] EStim Unattended 15651  [] NMR (88198)  x    [] Estim Attended  38606  [x] Manual (49231)  x  1  [] Mechanical Txn 11437  [] TA    x     [] Ultrasound  [] Gait   x  [] Vaso  [] CRP    [] Ionto           [] Other:        Electronically signed by: Ivis Fernandez PT,       Note: If patient does not return for scheduled/ recommended follow up visits, this note will serve as a discharge from care along with most recent update on progress.

## 2021-11-02 ENCOUNTER — HOSPITAL ENCOUNTER (OUTPATIENT)
Dept: PHYSICAL THERAPY | Age: 71
Setting detail: THERAPIES SERIES
Discharge: HOME | End: 2021-11-02
Payer: MEDICARE

## 2021-11-02 PROCEDURE — 97110 THERAPEUTIC EXERCISES: CPT

## 2021-11-02 PROCEDURE — 97140 MANUAL THERAPY 1/> REGIONS: CPT

## 2021-11-02 NOTE — FLOWSHEET NOTE
Physical Therapy Daily Treatment Note    [x]Daily Tx Note    []Progress Note    [] Discharge Summary         Date:  2021    Patient Name:  Elvis Lugo  \"Mac\"   :  1950  MRN: [id number]      Medical/Treatment Diagnosis Information:  ·  Rotator Cuff tendinitis M75.81, Right    Insurance/Certification information:   [address] Medicare Supp    Physician Information:   Geovanna Sanchez DO    Plan of care signed :  []  Yes  [x] No  [x]  Cosign []  Fax    Date of Patient follow up with Physician:     Is this a Progress Report:     []  Yes  [x]  No      If Yes:  Date Range for reporting period:  Beginning 2021  Ending 12/15/2021    Progress report will be due (10 Rx or 30 days whichever is less):       Recertification will be due (POC Duration  / 90 days whichever is less):  2022      Visit # Insurance Allowable Auth Required    BMN []  Yes [x]  No        Functional Scale:  UEFI     Date  10/6/2021  Score 52/80    Latex Allergy:  [x]NO      []YES  Preferred Language for Healthcare:   [x]English       []other:    RESTRICTIONS/PRECAUTIONS:      SUBJECTIVE: Pt reports that he is sore. Pain level:  1.5/10 now; sleeping on it 4/10      Plan Moving Forward/ For next visit:     Improve Scapular stability    Address posture deficits     Improve Rotator Cuff strength/function       OBJECTIVE: See eval    Exercises/Interventions:     Exercises in bold performed in department today. Items not bolded are carried forward from prior visits for continuity of the record.   Exercise/Equipment Resistance/Repetitions HEP Other comments     Scapular Squeezes 2x10 bilat [x] \"down and back cue\"     Mid and low Rows w/ scap squeeze 2x10 bilat  [x] GTB     Supraspinatus lifts  2x10 bilat [x] 2#     Eccentric curls 2x10 bilat [x]    Wall ball push ABCs2 ABCs bilat[]    Bilat UE PNF   []      LS and UT stretch  3x30 sec ea [x]        TrP to ER/SS/biceps tendon   []     inferior glide with towel   30 secs []        []        []        []        []        []        []        []      Therapeutic Exercise/Home Exercise Program:   20 minutes  See above   Access code [email or web address]    Therapeutic Activity:  0 minutes     Gait: 0 minutes    Neuromuscular Re-Education:  0 minutes      Canalith Repositioning Procedure:      Manual Therapy:  29 minutes  Scapular mobilization B; glenohumeral joint mobilization of right shoulder; AC mobilization; pec TPR. Modalities: 0 minutes    HEP:   Access Code: [email or web address]  URL: [email or web address]  Date: 10/18/2021      Exercises  Seated Upper Trapezius Stretch - 1 x daily - 4 x weekly - 3 sets - 30 hold  Seated Levator Scapulae Stretch - 1 x daily - 4 x weekly - 3 sets - 30 hold  Seated Scapular Retraction - 1 x daily - 4 x weekly - 3 sets - 10 reps - 5 hold  Shoulder External Rotation with Anchored Resistance - 1 x daily - 4 x weekly - 3 sets - 10 reps  Standing Shoulder Internal Rotation with Anchored Resistance - 1 x daily - 4 x weekly - 3 sets - 10 reps  Standing Shoulder Row with Anchored Resistance - 1 x daily - 4 x weekly - 3 sets - 10 reps - green band resistance  Standing Single Arm Eccentric Bicep Curl Supinated - 1 x daily - 4 x weekly - 3 sets - 10 reps     ASSESSMENT:  Pt demonstrates impingement in right UE and decreased mobility of right scapula compared to left. Goals:   Patient stated goal: Address shoulder pain and prevent surgery   [x]? Progressing: []? Met: []? Not Met: []? Adjusted     Therapist goals for Patient:   Short Term Goals: To be achieved in: 2 weeks  1. Independent in HEP and progression per patient tolerance, in order to prevent re-injury. []? Progressing: [x]? Met: []? Not Met: []? Adjusted  2. Patient will have a decrease in pain to facilitate improvement in movement, function, and ADLs as indicated by Functional Deficits. []? Progressing: [x]? Met: []? Not Met: []? Adjusted     Long Term Goals:  To be achieved in: activities related to strengthening, flexibility, endurance, ROM  for improvements in proximal strength and core control with self care, mobility, lifting and ambulation.  [] (10389) Provided verbal/tactile cueing for activities related to improving balance, coordination, kinesthetic sense, posture, motor skill, proprioception  to assist with core control in self care, mobility, lifting, and ambulation. Therapeutic Activities and Gait:    [] (33903 or 57727) Provided verbal/tactile cueing for activities related to improving balance, coordination, kinesthetic sense, posture, motor skill, proprioception and motor activation to allow for proper function  with self care and ADLs  [] (81753) Provided training and instruction to the patient for proper core and proximal hip recruitment and positioning with ambulation re-education     Home Exercise Program:    [x] (30379) Reviewed/Progressed HEP activities related to strengthening, flexibility, endurance, ROM of core, proximal hip and LE for functional self-care, mobility, lifting and ambulation   [] (73946) Reviewed/Progressed HEP activities related to improving balance, coordination, kinesthetic sense, posture, motor skill, proprioception of core, proximal hip and LE for self care, mobility, lifting, and ambulation      Manual Treatments:  PROM / STM / Oscillations-Mobs:  G-I, II, III, IV (PA's, Inf., Post.)  [x] (79559) Provided manual therapy to mobilize proximal hip and LS spine soft tissue/joints for the purpose of modulating pain, promoting relaxation,  increasing ROM, reducing/eliminating soft tissue swelling/inflammation/restriction, improving soft tissue extensibility and allowing for proper ROM for normal function with self care, mobility, lifting and ambulation.      []CRP:  Canalith Repositioning procedure for the assessment, treatment and education of BPPV    Modalities:       Charges:  Timed Code Treatment Minutes: 49   Total Treatment Minutes: 52 Medicare Cap total YTD:        [x]N/A  Workers Comp Time Stamp  (Per CPT and Total Treatment) [x]N/A       [] EVAL    [] Dry Needling  [x] KF(66367)   x  1  [] EStim Unattended 23764  [] NMR (72025)  x    [] Estim Attended  47983  [x] Manual (34811)  x  2  [] Mechanical Txn 09296  [] TA    x     [] Ultrasound  [] Gait   x  [] Vaso  [] CRP    [] Ionto           [] Other:        Electronically signed by: Luisana Timmons PT, [id number]      Note: If patient does not return for scheduled/ recommended follow up visits, this note will serve as a discharge from care along with most recent update on progress.

## 2021-11-04 ENCOUNTER — HOSPITAL ENCOUNTER (OUTPATIENT)
Dept: PHYSICAL THERAPY | Age: 71
Setting detail: THERAPIES SERIES
Discharge: HOME | End: 2021-11-04
Payer: MEDICARE

## 2021-11-04 PROCEDURE — 97110 THERAPEUTIC EXERCISES: CPT

## 2021-11-04 PROCEDURE — 97140 MANUAL THERAPY 1/> REGIONS: CPT

## 2021-11-04 NOTE — PROGRESS NOTES
Physical Therapy Daily Treatment Note    [x]Daily Tx Note    [x]Progress Note    [] Discharge Summary         Date:  2021    Patient Name:  Adwoa Julien  \"Mac\"   :  1950  MRN: [id number]      Medical/Treatment Diagnosis Information:  ·  Rotator Cuff tendinitis M75.81, Right    Insurance/Certification information:   [address] Medicare Supp    Physician Information:   Giovany Saavedra DO    Plan of care signed :  []  Yes  [x] No  [x]  Cosign []  Fax    Date of Patient follow up with Physician:     Is this a Progress Report:     [x]  Yes  []  No      If Yes:  Date Range for reporting period:  Beginning 10/6/2021   Ending 2021    Progress report will be due (10 Rx or 30 days whichever is less):       Recertification will be due (POC Duration  / 90 days whichever is less):  2022      Visit # Insurance Allowable Auth Required    BMN []  Yes [x]  No        Functional Scale:  UEFI     Date  2021  Score 56/80    Latex Allergy:  [x]NO      []YES  Preferred Language for Healthcare:   [x]English       []other:    RESTRICTIONS/PRECAUTIONS:      SUBJECTIVE: Pt reports that he was painful this AM               Pain level:  1.5/10 now; sleeping on it 4-510; end 0-1/10      Plan Moving Forward/ For next visit:     Improve Scapular stability    Address posture deficits     Improve Rotator Cuff strength/function       OBJECTIVE: See eval    Exercises/Interventions:     Exercises in bold performed in department today. Items not bolded are carried forward from prior visits for continuity of the record.   Exercise/Equipment Resistance/Repetitions HEP Other comments     Scapular Squeezes 2x10 bilat [x] \"down and back cue\"   ER in sidelying 2x10 bilatattempted with theraband with increased pain   Mid and low Rows w/ scap squeeze 2x10 bilat reviewed [x] GTB     Supraspinatus lifts  2x10 bilat [x] 2# shorten range in painfree range     Eccentric curls 2x10 bilat [x] Wall ball push ABCs2 ABCs bilat[]    Bilat UE PNF   []      LS and UT stretch  3x30 sec ea [x]        TrP to ER/SS/biceps tendon   []     inferior glide with towel   reviewed []        []        []        []        []        []        []        []      Therapeutic Exercise/Home Exercise Program:   25 minutes  See above   Access code [email or web address]  PROM: flexion 175, abduction 170, IR 90 at 85 degrees, ER 80 at 90 degrees abduction  AROM; flexion 160 (pain at 134), abduction 105, ER T3, IR mid thoracic    Therapeutic Activity:  0 minutes     Gait: 0 minutes    Neuromuscular Re-Education:  0 minutes      Canalith Repositioning Procedure:      Manual Therapy:  20 minutes  Scapular mobilization R; glenohumeral joint mobilization of right shoulder; AC mobilization; pec TPR. Modalities: 0 minutes    HEP:   Access Code: [email or web address]  URL: [email or web address]  Date: 10/18/2021      Exercises  Seated Upper Trapezius Stretch - 1 x daily - 4 x weekly - 3 sets - 30 hold  Seated Levator Scapulae Stretch - 1 x daily - 4 x weekly - 3 sets - 30 hold  Seated Scapular Retraction - 1 x daily - 4 x weekly - 3 sets - 10 reps - 5 hold  Shoulder External Rotation with Anchored Resistance - 1 x daily - 4 x weekly - 3 sets - 10 reps  Standing Shoulder Internal Rotation with Anchored Resistance - 1 x daily - 4 x weekly - 3 sets - 10 reps  Standing Shoulder Row with Anchored Resistance - 1 x daily - 4 x weekly - 3 sets - 10 reps - green band resistance  Standing Single Arm Eccentric Bicep Curl Supinated - 1 x daily - 4 x weekly - 3 sets - 10 reps     ASSESSMENT:  Pt demonstrates improvement with ROM and decreased pain except with sleeping. Goals:   Patient stated goal: Address shoulder pain and prevent surgery   [x]? Progressing: []? Met: []? Not Met: []? Adjusted     Therapist goals for Patient:   Short Term Goals: To be achieved in: 2 weeks  1. Independent in HEP and progression per patient tolerance, in order to prevent re-injury. []? Progressing: [x]? Met: []? Not Met: []? Adjusted  2. Patient will have a decrease in pain to facilitate improvement in movement, function, and ADLs as indicated by Functional Deficits. []? Progressing: [x]? Met: []? Not Met: []? Adjusted     Long Term Goals: To be achieved in: 10 weeks  1. UEFI of 15/80 or less  to assist with reaching prior level of function. [x]? Progressing: []? Met: []? Not Met: []? Adjusted  2. Patient will demonstrate increased AROM to 90% to allow for proper joint functioning as indicated by Functional Deficits. [x]? Progressing: []? Met: []? Not Met: []? Adjusted  3. Patient will demonstrate an increase in NM recruitment/activation and overall GH and scapular strength to 4+/5 or greater, for proper functional mobility as indicated by patients Functional Deficits. [x]? Progressing: []? Met: []? Not Met: []? Adjusted  4. Patient will return to Functional activities without increased symptoms or restriction. [x]? Progressing: []? Met: []? Not Met: []? Adjusted  5. Pt will return to all work/volunteer activities without increased symptoms or restriction. [x]? Progressing: []? Met: []? Not Met: []? Adjusted      Overall Progression Towards Functional goals/ Treatment Progress Update:  [x] Patient is progressing as expected towards functional goals listed. [] Progression is slowed due to complexities/Impairments listed. [] Progression has been slowed due to co-morbidities.   [] Plan just implemented, too soon to assess goals progression <30days   [] Goals require adjustment due to lack of progress  [] Patient is not progressing as expected and requires additional follow up with physician  [] Other    Prognosis for POC: [x] Good [] Fair  [] Poor    Patient requires continued skilled intervention: [x] Yes  [] No    Treatment/Activity Tolerance:  [x] Patient able to complete treatment  [] Patient limited by fatigue  [] Patient limited by pain    [] Patient limited by other medical complications  [] Other:   2 times per week for 5 more weeks      PLAN: See eval  [x] Continue per plan of care [] Alter current plan (see comments above)  [] Plan of care initiated [] Hold pending MD visit [] Discharge    Therapeutic Exercise and NMR EXR  [x] (61996) Provided verbal/tactile cueing for activities related to strengthening, flexibility, endurance, ROM  for improvements in proximal strength and core control with self care, mobility, lifting and ambulation.  [] (95017) Provided verbal/tactile cueing for activities related to improving balance, coordination, kinesthetic sense, posture, motor skill, proprioception  to assist with core control in self care, mobility, lifting, and ambulation.      Therapeutic Activities and Gait:    [] (37593 or 26440) Provided verbal/tactile cueing for activities related to improving balance, coordination, kinesthetic sense, posture, motor skill, proprioception and motor activation to allow for proper function  with self care and ADLs  [] (55635) Provided training and instruction to the patient for proper core and proximal hip recruitment and positioning with ambulation re-education     Home Exercise Program:    [x] (49420) Reviewed/Progressed HEP activities related to strengthening, flexibility, endurance, ROM of core, proximal hip and LE for functional self-care, mobility, lifting and ambulation   [] (16787) Reviewed/Progressed HEP activities related to improving balance, coordination, kinesthetic sense, posture, motor skill, proprioception of core, proximal hip and LE for self care, mobility, lifting, and ambulation      Manual Treatments:  PROM / STM / Oscillations-Mobs:  G-I, II, III, IV (PA's, Inf., Post.)  [x] (30634) Provided manual therapy to mobilize proximal hip and LS spine soft tissue/joints for the purpose of modulating pain, promoting relaxation,  increasing ROM, reducing/eliminating soft tissue swelling/inflammation/restriction, improving soft tissue extensibility and allowing for proper ROM for normal function with self care, mobility, lifting and ambulation. []CRP:  Canalith Repositioning procedure for the assessment, treatment and education of BPPV    Modalities:       Charges:  Timed Code Treatment Minutes: 45   Total Treatment Minutes: 45     Medicare Cap total YTD:        [x]N/A  Workers Comp Time Stamp  (Per CPT and Total Treatment) [x]N/A       [] EVAL    [] Dry Needling  [x] UJ(46411)   x  2  [] EStim Unattended 04910  [] NMR (47793)  x     [] Estim Attended  52754  [x] Manual (13493)  x  1  [] Mechanical Txn 91137  [] TA    x     [] Ultrasound  [] Gait   x  [] Vaso  [] CRP    [] Ionto           [] Other:        Electronically signed by: Devante Padilla PT, [id number]      Note: If patient does not return for scheduled/ recommended follow up visits, this note will serve as a discharge from care along with most recent update on progress.

## 2021-11-09 ENCOUNTER — HOSPITAL ENCOUNTER (OUTPATIENT)
Dept: PHYSICAL THERAPY | Age: 71
Setting detail: THERAPIES SERIES
Discharge: HOME | End: 2021-11-09
Payer: MEDICARE

## 2021-11-09 PROCEDURE — 97110 THERAPEUTIC EXERCISES: CPT

## 2021-11-09 PROCEDURE — 97140 MANUAL THERAPY 1/> REGIONS: CPT

## 2021-11-09 NOTE — FLOWSHEET NOTE
Physical Therapy Daily Treatment Note    [x]Daily Tx Note    []Progress Note    [] Discharge Summary         Date:  2021    Patient Name:  Yaniv Millan  \"Mac\"   :  1950  MRN: [id number]      Medical/Treatment Diagnosis Information:  ·  Rotator Cuff tendinitis M75.81, Right    Insurance/Certification information:   [address] Medicare Supp    Physician Information:   Rylee Del Angel,     Plan of care signed :  []  Yes  [x] No  [x]  Cosign []  Fax    Date of Patient follow up with Physician:     Is this a Progress Report:     []  Yes  [x]  No      If Yes:  Date Range for reporting period:  Beginning 10/6/2021   Ending 2021    Progress report will be due (10 Rx or 30 days whichever is less):       Recertification will be due (POC Duration  / 90 days whichever is less):  2022      Visit # Insurance Allowable Auth Required   10/ 20 BMN []  Yes [x]  No        Functional Scale:  UEFI     Date  2021  Score 56/80    Latex Allergy:  [x]NO      []YES  Preferred Language for Healthcare:   [x]English       []other:    RESTRICTIONS/PRECAUTIONS:      SUBJECTIVE: Pt reports that he was painful this AM and tried inferior glide waking up and it did not seem to help. Pain level:  1-2/10 now; end 210      Plan Moving Forward/ For next visit:     Improve Scapular stability    Address posture deficits     Improve Rotator Cuff strength/function       OBJECTIVE: See eval    Exercises/Interventions:     Exercises in bold performed in department today. Items not bolded are carried forward from prior visits for continuity of the record.   Exercise/Equipment Resistance/Repetitions HEP Other comments     Scapular Squeezes 2x10 bilat [x] \"down and back cue\"   ER in sidelying 2x10 bilatInstructed to decrease range  Mid and low Rows w/ scap squeeze 2x10 bilat reviewed [x] GTB     Supraspinatus lifts  2x10 bilat [x] 2# shorten range in painfree range     Eccentric curls 2x10 bilat [x]    Wall ball push ABCs2 ABCs bilat[]    Bilat UE PNF   []      LS and UT stretch  3x30 sec ea [x]        TrP to ER/SS/biceps tendon   []     inferior glide with towel   reviewed []        []        []        []        []        []        []      UBE  seat 12 L 1  2 minutes forward and 2 minutes backward []      Therapeutic Exercise/Home Exercise Program:   25 minutes  See above   Access code [email or web address]  PROM right UE  Instructed on laying in sidelying with pillow prop. Therapeutic Activity:  0 minutes     Gait: 0 minutes    Neuromuscular Re-Education:  0 minutes      Canalith Repositioning Procedure:      Manual Therapy:  20 minutes  Scapular mobilization R; glenohumeral joint mobilization of right shoulder; AC mobilization; pec TPR. Modalities: 0 minutes    HEP:   Access Code: [email or web address]  URL: [email or web address]  Date: 10/18/2021      Exercises  Seated Upper Trapezius Stretch - 1 x daily - 4 x weekly - 3 sets - 30 hold  Seated Levator Scapulae Stretch - 1 x daily - 4 x weekly - 3 sets - 30 hold  Seated Scapular Retraction - 1 x daily - 4 x weekly - 3 sets - 10 reps - 5 hold  Shoulder External Rotation with Anchored Resistance - 1 x daily - 4 x weekly - 3 sets - 10 reps  Standing Shoulder Internal Rotation with Anchored Resistance - 1 x daily - 4 x weekly - 3 sets - 10 reps  Standing Shoulder Row with Anchored Resistance - 1 x daily - 4 x weekly - 3 sets - 10 reps - green band resistance  Standing Single Arm Eccentric Bicep Curl Supinated - 1 x daily - 4 x weekly - 3 sets - 10 reps     ASSESSMENT:  Pt demonstrates improvement with ROM and decreased pain except with sleeping. Goals:   Patient stated goal: Address shoulder pain and prevent surgery   [x]? Progressing: []? Met: []? Not Met: []? Adjusted     Therapist goals for Patient:   Short Term Goals: To be achieved in: 2 weeks  1. Independent in HEP and progression per patient tolerance, in order to prevent re-injury.    []? Progressing: [x]? Met: []? Not Met: []? Adjusted  2. Patient will have a decrease in pain to facilitate improvement in movement, function, and ADLs as indicated by Functional Deficits. []? Progressing: [x]? Met: []? Not Met: []? Adjusted     Long Term Goals: To be achieved in: 10 weeks  1. UEFI of 15/80 or less  to assist with reaching prior level of function. [x]? Progressing: []? Met: []? Not Met: []? Adjusted  2. Patient will demonstrate increased AROM to 90% to allow for proper joint functioning as indicated by Functional Deficits. [x]? Progressing: []? Met: []? Not Met: []? Adjusted  3. Patient will demonstrate an increase in NM recruitment/activation and overall GH and scapular strength to 4+/5 or greater, for proper functional mobility as indicated by patients Functional Deficits. [x]? Progressing: []? Met: []? Not Met: []? Adjusted  4. Patient will return to Functional activities without increased symptoms or restriction. [x]? Progressing: []? Met: []? Not Met: []? Adjusted  5. Pt will return to all work/volunteer activities without increased symptoms or restriction. [x]? Progressing: []? Met: []? Not Met: []? Adjusted      Overall Progression Towards Functional goals/ Treatment Progress Update:  [x] Patient is progressing as expected towards functional goals listed. [] Progression is slowed due to complexities/Impairments listed. [] Progression has been slowed due to co-morbidities.   [] Plan just implemented, too soon to assess goals progression <30days   [] Goals require adjustment due to lack of progress  [] Patient is not progressing as expected and requires additional follow up with physician  [] Other    Prognosis for POC: [x] Good [] Fair  [] Poor    Patient requires continued skilled intervention: [x] Yes  [] No    Treatment/Activity Tolerance:  [x] Patient able to complete treatment  [] Patient limited by fatigue  [] Patient limited by pain    [] Patient limited by other medical complications  [] Other:   2 times per week for 5 more weeks      PLAN: See eval  [x] Continue per plan of care [] Alter current plan (see comments above)  [] Plan of care initiated [] Hold pending MD visit [] Discharge    Therapeutic Exercise and NMR EXR  [x] (85274) Provided verbal/tactile cueing for activities related to strengthening, flexibility, endurance, ROM  for improvements in proximal strength and core control with self care, mobility, lifting and ambulation.  [] (16626) Provided verbal/tactile cueing for activities related to improving balance, coordination, kinesthetic sense, posture, motor skill, proprioception  to assist with core control in self care, mobility, lifting, and ambulation.      Therapeutic Activities and Gait:    [] (65537 or 66339) Provided verbal/tactile cueing for activities related to improving balance, coordination, kinesthetic sense, posture, motor skill, proprioception and motor activation to allow for proper function  with self care and ADLs  [] (96286) Provided training and instruction to the patient for proper core and proximal hip recruitment and positioning with ambulation re-education     Home Exercise Program:    [x] (11045) Reviewed/Progressed HEP activities related to strengthening, flexibility, endurance, ROM of core, proximal hip and LE for functional self-care, mobility, lifting and ambulation   [] (87081) Reviewed/Progressed HEP activities related to improving balance, coordination, kinesthetic sense, posture, motor skill, proprioception of core, proximal hip and LE for self care, mobility, lifting, and ambulation      Manual Treatments:  PROM / STM / Oscillations-Mobs:  G-I, II, III, IV (PA's, Inf., Post.)  [x] (59128) Provided manual therapy to mobilize proximal hip and LS spine soft tissue/joints for the purpose of modulating pain, promoting relaxation,  increasing ROM, reducing/eliminating soft tissue swelling/inflammation/restriction, improving soft tissue

## 2021-11-11 ENCOUNTER — HOSPITAL ENCOUNTER (OUTPATIENT)
Dept: PHYSICAL THERAPY | Age: 71
Setting detail: THERAPIES SERIES
Discharge: HOME | End: 2021-11-11
Payer: MEDICARE

## 2021-11-11 PROCEDURE — 97140 MANUAL THERAPY 1/> REGIONS: CPT

## 2021-11-11 PROCEDURE — 97110 THERAPEUTIC EXERCISES: CPT

## 2021-11-11 NOTE — FLOWSHEET NOTE
Physical Therapy Daily Treatment Note    [x]Daily Tx Note    []Progress Note    [] Discharge Summary         Date:  2021    Patient Name:  Jam Wyatt  \"Mac\"   :  1950  MRN: [id number]      Medical/Treatment Diagnosis Information:  ·  Rotator Cuff tendinitis M75.81, Right    Insurance/Certification information:   [address] Medicare Supp    Physician Information:   Koffi Ashby DO    Plan of care signed :  []  Yes  [x] No  [x]  Cosign []  Fax    Date of Patient follow up with Physician:     Is this a Progress Report:     []  Yes  [x]  No      If Yes:  Date Range for reporting period:  Beginning 10/6/2021   Ending 2021    Progress report will be due (10 Rx or 30 days whichever is less):       Recertification will be due (POC Duration  / 90 days whichever is less):  2022      Visit # Insurance Allowable Auth Required    BMN []  Yes [x]  No        Functional Scale:  UEFI     Date  2021  Score 56/80    Latex Allergy:  [x]NO      []YES  Preferred Language for Healthcare:   [x]English       []other:    RESTRICTIONS/PRECAUTIONS:      SUBJECTIVE: Pt reports that he was able to sleep on side modified with pillows. Pain level:  0/10 resting1-2/10 now; end 2/10      Plan Moving Forward/ For next visit:     Improve Scapular stability    Address posture deficits     Improve Rotator Cuff strength/function       OBJECTIVE: See eval    Exercises/Interventions:     Exercises in bold performed in department today. Items not bolded are carried forward from prior visits for continuity of the record.   Exercise/Equipment Resistance/Repetitions HEP Other comments     Scapular Squeezes 2x10 bilat [x] \"down and back cue\"   ER in sidelying 2x10 bilatInstructed to decrease range  Mid and low Rows w/ scap squeeze 2x10 bilat reviewed [x] GTB     Supraspinatus lifts  2x10 bilat [x] 2# shorten range in painfree range     Eccentric curls 2x10 bilat [x]    Wall ball push ABCs2 ABCs bilat[]    Bilat UE PNF   []      LS and UT stretch  3x30 sec ea [x]        TrP to ER/SS/biceps tendon   []     inferior glide with towel   reviewed []      Supine flexion to shoulder height 1x10  []        []        []        []        []        []      UBE  seat 12 L 1  2 minutes forward and 2 minutes backward []      Therapeutic Exercise/Home Exercise Program:  37  minutes  See above  Reviewed HEP   Access code [email or web address]  PROM right UE    Therapeutic Activity:  0 minutes     Gait: 0 minutes    Neuromuscular Re-Education:  0 minutes      Canalith Repositioning Procedure:      Manual Therapy:  12 minutes  Scapular mobilization R; glenohumeral joint mobilization of right shoulder; AC mobilization; pec TPR. Modalities: 0 minutes    HEP:   Access Code: [email or web address]  URL: [email or web address]  Date: 10/18/2021      Exercises  Seated Upper Trapezius Stretch - 1 x daily - 4 x weekly - 3 sets - 30 hold  Seated Levator Scapulae Stretch - 1 x daily - 4 x weekly - 3 sets - 30 hold  Seated Scapular Retraction - 1 x daily - 4 x weekly - 3 sets - 10 reps - 5 hold  Shoulder External Rotation with Anchored Resistance - 1 x daily - 4 x weekly - 3 sets - 10 reps  Standing Shoulder Internal Rotation with Anchored Resistance - 1 x daily - 4 x weekly - 3 sets - 10 reps  Standing Shoulder Row with Anchored Resistance - 1 x daily - 4 x weekly - 3 sets - 10 reps - green band resistance  Standing Single Arm Eccentric Bicep Curl Supinated - 1 x daily - 4 x weekly - 3 sets - 10 reps     ASSESSMENT:  Pt demonstrates improvement with ROM and decreased pain except with sleeping. Goals:   Patient stated goal: Address shoulder pain and prevent surgery   [x]? Progressing: []? Met: []? Not Met: []? Adjusted     Therapist goals for Patient:   Short Term Goals: To be achieved in: 2 weeks  1. Independent in HEP and progression per patient tolerance, in order to prevent re-injury. []? Progressing: [x]? Met: []?  Not Met: []? Adjusted  2. Patient will have a decrease in pain to facilitate improvement in movement, function, and ADLs as indicated by Functional Deficits. []? Progressing: [x]? Met: []? Not Met: []? Adjusted     Long Term Goals: To be achieved in: 10 weeks  1. UEFI of 15/80 or less  to assist with reaching prior level of function. [x]? Progressing: []? Met: []? Not Met: []? Adjusted  2. Patient will demonstrate increased AROM to 90% to allow for proper joint functioning as indicated by Functional Deficits. [x]? Progressing: []? Met: []? Not Met: []? Adjusted  3. Patient will demonstrate an increase in NM recruitment/activation and overall GH and scapular strength to 4+/5 or greater, for proper functional mobility as indicated by patients Functional Deficits. [x]? Progressing: []? Met: []? Not Met: []? Adjusted  4. Patient will return to Functional activities without increased symptoms or restriction. [x]? Progressing: []? Met: []? Not Met: []? Adjusted  5. Pt will return to all work/volunteer activities without increased symptoms or restriction. [x]? Progressing: []? Met: []? Not Met: []? Adjusted      Overall Progression Towards Functional goals/ Treatment Progress Update:  [x] Patient is progressing as expected towards functional goals listed. [] Progression is slowed due to complexities/Impairments listed. [] Progression has been slowed due to co-morbidities.   [] Plan just implemented, too soon to assess goals progression <30days   [] Goals require adjustment due to lack of progress  [] Patient is not progressing as expected and requires additional follow up with physician  [] Other    Prognosis for POC: [x] Good [] Fair  [] Poor    Patient requires continued skilled intervention: [x] Yes  [] No    Treatment/Activity Tolerance:  [x] Patient able to complete treatment  [] Patient limited by fatigue  [] Patient limited by pain    [] Patient limited by other medical complications  [] Other:   2 times per week for 4 more weeks      PLAN: See eval  [x] Continue per plan of care [] Alter current plan (see comments above)  [] Plan of care initiated [] Hold pending MD visit [] Discharge    Therapeutic Exercise and NMR EXR  [x] (61405) Provided verbal/tactile cueing for activities related to strengthening, flexibility, endurance, ROM  for improvements in proximal strength and core control with self care, mobility, lifting and ambulation.  [] (99544) Provided verbal/tactile cueing for activities related to improving balance, coordination, kinesthetic sense, posture, motor skill, proprioception  to assist with core control in self care, mobility, lifting, and ambulation.      Therapeutic Activities and Gait:    [] (34932 or 41296) Provided verbal/tactile cueing for activities related to improving balance, coordination, kinesthetic sense, posture, motor skill, proprioception and motor activation to allow for proper function  with self care and ADLs  [] (48245) Provided training and instruction to the patient for proper core and proximal hip recruitment and positioning with ambulation re-education     Home Exercise Program:    [x] (40381) Reviewed/Progressed HEP activities related to strengthening, flexibility, endurance, ROM of core, proximal hip and LE for functional self-care, mobility, lifting and ambulation   [] (97960) Reviewed/Progressed HEP activities related to improving balance, coordination, kinesthetic sense, posture, motor skill, proprioception of core, proximal hip and LE for self care, mobility, lifting, and ambulation      Manual Treatments:  PROM / STM / Oscillations-Mobs:  G-I, II, III, IV (PA's, Inf., Post.)  [x] (89431) Provided manual therapy to mobilize proximal hip and LS spine soft tissue/joints for the purpose of modulating pain, promoting relaxation,  increasing ROM, reducing/eliminating soft tissue swelling/inflammation/restriction, improving soft tissue extensibility and allowing for proper ROM for normal function with self care, mobility, lifting and ambulation. []CRP:  Canalith Repositioning procedure for the assessment, treatment and education of BPPV    Modalities:       Charges:  Timed Code Treatment Minutes: 49   Total Treatment Minutes: 52     Medicare Cap total YTD:        [x]N/A  Workers Comp Time Stamp  (Per CPT and Total Treatment) [x]N/A       [] EVAL    [] Dry Needling  [x] CY(76624)   x  2  [] EStim Unattended 01379  [] NMR (86583)  x     [] Estim Attended  53013  [x] Manual (24016)  x  1  [] Mechanical Txn 14270  [] TA    x     [] Ultrasound  [] Gait   x  [] Vaso  [] CRP    [] Ionto           [] Other:        Electronically signed by: Josephine Hernandez PT, [id number]      Note: If patient does not return for scheduled/ recommended follow up visits, this note will serve as a discharge from care along with most recent update on progress.

## 2021-11-16 ENCOUNTER — HOSPITAL ENCOUNTER (OUTPATIENT)
Dept: PHYSICAL THERAPY | Age: 71
Setting detail: THERAPIES SERIES
Discharge: HOME | End: 2021-11-16
Payer: MEDICARE

## 2021-11-16 PROCEDURE — 97110 THERAPEUTIC EXERCISES: CPT

## 2021-11-16 PROCEDURE — 97140 MANUAL THERAPY 1/> REGIONS: CPT

## 2021-11-16 NOTE — FLOWSHEET NOTE
Physical Therapy Daily Treatment Note    [x]Daily Tx Note    []Progress Note    [] Discharge Summary         Date:  2021    Patient Name:  Gennaro Penn  \"Mac\"   :  1950  MRN: [id number]      Medical/Treatment Diagnosis Information:  ·  Rotator Cuff tendinitis M75.81, Right    Insurance/Certification information:   [address] Medicare Supp    Physician Information:   Selene Gunter DO    Plan of care signed :  []  Yes  [x] No  [x]  Cosign []  Fax    Date of Patient follow up with Physician:     Is this a Progress Report:     []  Yes  [x]  No      If Yes:  Date Range for reporting period:  Beginning 10/6/2021   Ending 2021    Progress report will be due (10 Rx or 30 days whichever is less):       Recertification will be due (POC Duration  / 90 days whichever is less):  2022      Visit # Insurance Allowable Auth Required    BMN []  Yes [x]  No        Functional Scale:  UEFI     Date  2021  Score 56/80    Latex Allergy:  [x]NO      []YES  Preferred Language for Healthcare:   [x]English       []other:    RESTRICTIONS/PRECAUTIONS:      SUBJECTIVE: Pt reports he is a little sore. Pain level:  1/10 resting1-2/10 now; end 2/10      Plan Moving Forward/ For next visit:     Improve Scapular stability    Address posture deficits     Improve Rotator Cuff strength/function       OBJECTIVE: See eval    Exercises/Interventions:     Exercises in bold performed in department today. Items not bolded are carried forward from prior visits for continuity of the record.   Exercise/Equipment Resistance/Repetitions HEP Other comments     Scapular Squeezes 2x10 bilat [x] \"down and back cue\"   ER in sidelying 2x10 bilat Instructed to decrease range  Mid and low Rows w/ scap squeeze 2x10 bilat reviewed [x] GTB     Supraspinatus lifts  2x10 bilat [x] 2# shorten range in painfree range     Eccentric curls 2x10 bilat [x]    Wall ball push ABCs2 ABCs bilat[]    Bilat UE PNF   []      LS and UT stretch  3x30 sec ea [x]        TrP to ER/SS/biceps tendon   []     inferior glide with towel   reviewed []      Supine flexion to shoulder height 1x10  []        []        []        []        []        []      UBE  seat 12 L 1  2 minutes forward and 2 minutes backward []      Therapeutic Exercise/Home Exercise Program:  25  minutes  See above  Reviewed HEP   Access code [email or web address]  PROM right UE    Therapeutic Activity:  0 minutes     Gait: 0 minutes    Neuromuscular Re-Education:  0 minutes      Canalith Repositioning Procedure:      Manual Therapy:  13 minutes  Scapular mobilization R; glenohumeral joint mobilization of right shoulder; AC mobilization. Modalities: 0 minutes    HEP:   Access Code: [email or web address]  URL: [email or web address]  Date: 10/18/2021      Exercises  Seated Upper Trapezius Stretch - 1 x daily - 4 x weekly - 3 sets - 30 hold  Seated Levator Scapulae Stretch - 1 x daily - 4 x weekly - 3 sets - 30 hold  Seated Scapular Retraction - 1 x daily - 4 x weekly - 3 sets - 10 reps - 5 hold  Shoulder External Rotation with Anchored Resistance - 1 x daily - 4 x weekly - 3 sets - 10 reps  Standing Shoulder Internal Rotation with Anchored Resistance - 1 x daily - 4 x weekly - 3 sets - 10 reps  Standing Shoulder Row with Anchored Resistance - 1 x daily - 4 x weekly - 3 sets - 10 reps - green band resistance  Standing Single Arm Eccentric Bicep Curl Supinated - 1 x daily - 4 x weekly - 3 sets - 10 reps     ASSESSMENT:  Pt demonstrates improvement with ROM. Goals:   Patient stated goal: Address shoulder pain and prevent surgery   [x]? Progressing: []? Met: []? Not Met: []? Adjusted     Therapist goals for Patient:   Short Term Goals: To be achieved in: 2 weeks  1. Independent in HEP and progression per patient tolerance, in order to prevent re-injury. []? Progressing: [x]? Met: []? Not Met: []? Adjusted  2.  Patient will have a decrease in pain to facilitate improvement in movement, function, and ADLs as indicated by Functional Deficits. []? Progressing: [x]? Met: []? Not Met: []? Adjusted     Long Term Goals: To be achieved in: 10 weeks  1. UEFI of 15/80 or less  to assist with reaching prior level of function. [x]? Progressing: []? Met: []? Not Met: []? Adjusted  2. Patient will demonstrate increased AROM to 90% to allow for proper joint functioning as indicated by Functional Deficits. [x]? Progressing: []? Met: []? Not Met: []? Adjusted  3. Patient will demonstrate an increase in NM recruitment/activation and overall GH and scapular strength to 4+/5 or greater, for proper functional mobility as indicated by patients Functional Deficits. [x]? Progressing: []? Met: []? Not Met: []? Adjusted  4. Patient will return to Functional activities without increased symptoms or restriction. [x]? Progressing: []? Met: []? Not Met: []? Adjusted  5. Pt will return to all work/volunteer activities without increased symptoms or restriction. [x]? Progressing: []? Met: []? Not Met: []? Adjusted      Overall Progression Towards Functional goals/ Treatment Progress Update:  [x] Patient is progressing as expected towards functional goals listed. [] Progression is slowed due to complexities/Impairments listed. [] Progression has been slowed due to co-morbidities.   [] Plan just implemented, too soon to assess goals progression <30days   [] Goals require adjustment due to lack of progress  [] Patient is not progressing as expected and requires additional follow up with physician  [] Other    Prognosis for POC: [x] Good [] Fair  [] Poor    Patient requires continued skilled intervention: [x] Yes  [] No    Treatment/Activity Tolerance:  [x] Patient able to complete treatment  [] Patient limited by fatigue  [] Patient limited by pain    [] Patient limited by other medical complications  [] Other:   2 times per week for 3 more weeks      PLAN: See eval  [x] Continue per plan of care [] Alter current plan (see comments above)  [] Plan of care initiated [] Hold pending MD visit [] Discharge    Therapeutic Exercise and NMR EXR  [x] (84157) Provided verbal/tactile cueing for activities related to strengthening, flexibility, endurance, ROM  for improvements in proximal strength and core control with self care, mobility, lifting and ambulation.  [] (45693) Provided verbal/tactile cueing for activities related to improving balance, coordination, kinesthetic sense, posture, motor skill, proprioception  to assist with core control in self care, mobility, lifting, and ambulation. Therapeutic Activities and Gait:    [] (65650 or 12741) Provided verbal/tactile cueing for activities related to improving balance, coordination, kinesthetic sense, posture, motor skill, proprioception and motor activation to allow for proper function  with self care and ADLs  [] (01578) Provided training and instruction to the patient for proper core and proximal hip recruitment and positioning with ambulation re-education     Home Exercise Program:    [x] (97987) Reviewed/Progressed HEP activities related to strengthening, flexibility, endurance, ROM of core, proximal hip and LE for functional self-care, mobility, lifting and ambulation   [] (95013) Reviewed/Progressed HEP activities related to improving balance, coordination, kinesthetic sense, posture, motor skill, proprioception of core, proximal hip and LE for self care, mobility, lifting, and ambulation      Manual Treatments:  PROM / STM / Oscillations-Mobs:  G-I, II, III, IV (PA's, Inf., Post.)  [x] (64965) Provided manual therapy to mobilize proximal hip and LS spine soft tissue/joints for the purpose of modulating pain, promoting relaxation,  increasing ROM, reducing/eliminating soft tissue swelling/inflammation/restriction, improving soft tissue extensibility and allowing for proper ROM for normal function with self care, mobility, lifting and ambulation. []CRP:  Canalith Repositioning procedure for the assessment, treatment and education of BPPV    Modalities:       Charges:  Timed Code Treatment Minutes: 38   Total Treatment Minutes: 38     Medicare Cap total YTD:        [x]N/A  Workers Comp Time Stamp  (Per CPT and Total Treatment) [x]N/A       [] EVAL    [] Dry Needling  [x] TA(21818)   x  2  [] EStim Unattended 63457  [] NMR (35631)  x     [] Estim Attended  43590  [x] Manual (83369)  x  1  [] Mechanical Txn 95879  [] TA    x     [] Ultrasound  [] Gait   x  [] Vaso  [] CRP    [] Ionto           [] Other:        Electronically signed by: Emelina Canass, PT, [id number]      Note: If patient does not return for scheduled/ recommended follow up visits, this note will serve as a discharge from care along with most recent update on progress.

## 2021-11-18 ENCOUNTER — HOSPITAL ENCOUNTER (OUTPATIENT)
Dept: PHYSICAL THERAPY | Age: 71
Setting detail: THERAPIES SERIES
Discharge: HOME | End: 2021-11-18
Payer: MEDICARE

## 2021-11-18 PROCEDURE — 97035 APP MDLTY 1+ULTRASOUND EA 15: CPT

## 2021-11-18 PROCEDURE — 97140 MANUAL THERAPY 1/> REGIONS: CPT

## 2021-11-18 PROCEDURE — 97110 THERAPEUTIC EXERCISES: CPT

## 2021-11-18 NOTE — FLOWSHEET NOTE
Physical Therapy Daily Treatment Note    [x]Daily Tx Note    []Progress Note    [] Discharge Summary         Date:  2021    Patient Name:  Johan Patiño  \"Mac\"   :  1950  MRN: [id number]      Medical/Treatment Diagnosis Information:  ·  Rotator Cuff tendinitis M75.81, Right    Insurance/Certification information:   [address] Medicare Supp    Physician Information:   Mindy Mcknight DO    Plan of care signed :  []  Yes  [x] No  [x]  Cosign []  Fax    Date of Patient follow up with Physician:     Is this a Progress Report:     []  Yes  [x]  No      If Yes:  Date Range for reporting period:  Beginning 10/6/2021   Ending 2021    Progress report will be due (10 Rx or 30 days whichever is less):       Recertification will be due (POC Duration  / 90 days whichever is less):  2022      Visit # Insurance Allowable Auth Required    BMN []  Yes [x]  No        Functional Scale:  UEFI     Date  2021  Score 56/80    Latex Allergy:  [x]NO      []YES  Preferred Language for Healthcare:   [x]English       []other:    RESTRICTIONS/PRECAUTIONS:      SUBJECTIVE:          Pain level:  1/10 resting1-2/10 now; end 2/10      Plan Moving Forward/ For next visit:     Improve Scapular stability    Address posture deficits     Improve Rotator Cuff strength/function       OBJECTIVE: See eval    Exercises/Interventions:     Exercises in bold performed in department today. Items not bolded are carried forward from prior visits for continuity of the record. Exercise/Equipment Resistance/Repetitions HEP Other comments     Scapular Squeezes 2x10 bilat [x] \"down and back cue\"   ER in sidelying 2x10 bilat Instructed to decrease range  Mid and low Rows w/ scap squeeze 2x10 bilat reviewed, reduced this AM secndary to popping.   [x] GTB     Supraspinatus lifts  2x10 bilat [x] 2# shorten range in painfree range     Eccentric curls 2x10 bilat [x]    Wall ball push ABCs2 ABCs bilat[] Bilat UE PNF   []      LS and UT stretch  3x30 sec ea [x]        TrP to ER/SS/biceps tendon   []     inferior glide with towel   reviewed []      Supine flexion to shoulder height 1x10  []        []        []        []        []        []      UBE  seat 12 L 1  2 minutes forward and 2 minutes backward []      Therapeutic Exercise/Home Exercise Program:  20 minutes  See above  Reviewed HEP   Access code [email or web address]  PROM right UE  AROM: flexion 130 pain but can progress to 150; 85 to 150 pain from 2-3/10; ER T3 3/10; IR mid thoracic. Strength: flexion 4/5, abd 5/5, ER 3-/5, IR 4+/5        Therapeutic Activity:  0 minutes     Gait: 0 minutes    Neuromuscular Re-Education:  0 minutes      Canalith Repositioning Procedure:      Manual Therapy:  10 minutes  Scapular mobilization R; glenohumeral joint mobilization of right shoulder; AC mobilization. Modalities: 10 minutes plus set up  US 1.5 w/cm2, 50%, 8 minutes rotator cuff region    HEP:   Access Code: [email or web address]  URL: [email or web address]  Date: 10/18/2021      Exercises  Seated Upper Trapezius Stretch - 1 x daily - 4 x weekly - 3 sets - 30 hold  Seated Levator Scapulae Stretch - 1 x daily - 4 x weekly - 3 sets - 30 hold  Seated Scapular Retraction - 1 x daily - 4 x weekly - 3 sets - 10 reps - 5 hold  Shoulder External Rotation with Anchored Resistance - 1 x daily - 4 x weekly - 3 sets - 10 reps  Standing Shoulder Internal Rotation with Anchored Resistance - 1 x daily - 4 x weekly - 3 sets - 10 reps  Standing Shoulder Row with Anchored Resistance - 1 x daily - 4 x weekly - 3 sets - 10 reps - green band resistance  Standing Single Arm Eccentric Bicep Curl Supinated - 1 x daily - 4 x weekly - 3 sets - 10 reps     ASSESSMENT:  Pt demonstrates improvement with ROM. Goals:   Patient stated goal: Address shoulder pain and prevent surgery   [x]? Progressing: []? Met: []? Not Met: []? Adjusted     Therapist goals for Patient:   Short Term Goals:  To be achieved in: 2 weeks  1. Independent in HEP and progression per patient tolerance, in order to prevent re-injury. []? Progressing: [x]? Met: []? Not Met: []? Adjusted  2. Patient will have a decrease in pain to facilitate improvement in movement, function, and ADLs as indicated by Functional Deficits. []? Progressing: [x]? Met: []? Not Met: []? Adjusted     Long Term Goals: To be achieved in: 10 weeks  1. UEFI of 15/80 or less  to assist with reaching prior level of function. [x]? Progressing: []? Met: []? Not Met: []? Adjusted  2. Patient will demonstrate increased AROM to 90% to allow for proper joint functioning as indicated by Functional Deficits. [x]? Progressing: []? Met: []? Not Met: []? Adjusted  3. Patient will demonstrate an increase in NM recruitment/activation and overall GH and scapular strength to 4+/5 or greater, for proper functional mobility as indicated by patients Functional Deficits. [x]? Progressing: []? Met: []? Not Met: []? Adjusted  4. Patient will return to Functional activities without increased symptoms or restriction. [x]? Progressing: []? Met: []? Not Met: []? Adjusted  5. Pt will return to all work/volunteer activities without increased symptoms or restriction. [x]? Progressing: []? Met: []? Not Met: []? Adjusted      Overall Progression Towards Functional goals/ Treatment Progress Update:  [x] Patient is progressing as expected towards functional goals listed. [] Progression is slowed due to complexities/Impairments listed. [] Progression has been slowed due to co-morbidities.   [] Plan just implemented, too soon to assess goals progression <30days   [] Goals require adjustment due to lack of progress  [] Patient is not progressing as expected and requires additional follow up with physician  [] Other    Prognosis for POC: [x] Good [] Fair  [] Poor    Patient requires continued skilled intervention: [x] Yes  [] No    Treatment/Activity Tolerance:  [x] Patient able to complete treatment  [] Patient limited by fatigue  [] Patient limited by pain    [] Patient limited by other medical complications  [] Other:   2 times per week for 3 more weeks      PLAN: See eval  [x] Continue per plan of care [] Alter current plan (see comments above)  [] Plan of care initiated [] Hold pending MD visit [] Discharge    Therapeutic Exercise and NMR EXR  [x] (86587) Provided verbal/tactile cueing for activities related to strengthening, flexibility, endurance, ROM  for improvements in proximal strength and core control with self care, mobility, lifting and ambulation.  [] (21965) Provided verbal/tactile cueing for activities related to improving balance, coordination, kinesthetic sense, posture, motor skill, proprioception  to assist with core control in self care, mobility, lifting, and ambulation.      Therapeutic Activities and Gait:    [] (13933 or 08915) Provided verbal/tactile cueing for activities related to improving balance, coordination, kinesthetic sense, posture, motor skill, proprioception and motor activation to allow for proper function  with self care and ADLs  [] (90801) Provided training and instruction to the patient for proper core and proximal hip recruitment and positioning with ambulation re-education     Home Exercise Program:    [x] (71017) Reviewed/Progressed HEP activities related to strengthening, flexibility, endurance, ROM of core, proximal hip and LE for functional self-care, mobility, lifting and ambulation   [] (44633) Reviewed/Progressed HEP activities related to improving balance, coordination, kinesthetic sense, posture, motor skill, proprioception of core, proximal hip and LE for self care, mobility, lifting, and ambulation      Manual Treatments:  PROM / STM / Oscillations-Mobs:  G-I, II, III, IV (PA's, Inf., Post.)  [x] (90456) Provided manual therapy to mobilize proximal hip and LS spine soft tissue/joints for the purpose of modulating pain, promoting relaxation,  increasing ROM, reducing/eliminating soft tissue swelling/inflammation/restriction, improving soft tissue extensibility and allowing for proper ROM for normal function with self care, mobility, lifting and ambulation. []CRP:  Canalith Repositioning procedure for the assessment, treatment and education of BPPV    Modalities:   US    Charges:  Timed Code Treatment Minutes: 40   Total Treatment Minutes: 40     Medicare Cap total YTD:        [x]N/A  Workers Comp Time Stamp  (Per CPT and Total Treatment) [x]N/A       [] EVAL    [] Dry Needling  [x] PK(79435)   x  1  [] EStim Unattended 16051  [] NMR (05580)  x     [] Estim Attended  95394  [x] Manual (15725)  x  1  [] Mechanical Txn 30014  [] TA    x     [x] Ultrasound  [] Gait   x  [] Vaso  [] CRP    [] Ionto           [] Other:        Electronically signed by: Risa Vargas PT, [id number]      Note: If patient does not return for scheduled/ recommended follow up visits, this note will serve as a discharge from care along with most recent update on progress.

## 2021-12-02 ENCOUNTER — HOSPITAL ENCOUNTER (OUTPATIENT)
Dept: PHYSICAL THERAPY | Age: 71
Setting detail: THERAPIES SERIES
Discharge: HOME | End: 2021-12-02
Payer: MEDICARE

## 2021-12-02 PROCEDURE — 97110 THERAPEUTIC EXERCISES: CPT

## 2021-12-02 PROCEDURE — 97140 MANUAL THERAPY 1/> REGIONS: CPT

## 2021-12-02 NOTE — PROGRESS NOTES
Physical Therapy Daily Treatment Note    [x]Daily Tx Note    [x]Progress Note    [] Discharge Summary         Date:  2021    Patient Name:  Yesika Prince  \"Mac\"   :  1950  MRN: [id number]      Medical/Treatment Diagnosis Information:  ·  Rotator Cuff tendinitis M75.81, Right    Insurance/Certification information:   [address] Medicare Supp    Physician Information:   Medhat Petit DO    Plan of care signed :  []  Yes  [x] No  [x]  Cosign []  Fax    Date of Patient follow up with Physician:     Is this a Progress Report:     [x]  Yes  []  No      If Yes:  Date Range for reporting period:  Beginning 2021   Ending 2021    Progress report will be due (10 Rx or 30 days whichever is less):       Recertification will be due (POC Duration  / 90 days whichever is less):  2022      Visit # Insurance Allowable Auth Required    BMN []  Yes [x]  No        Functional Scale:  UEFI  NV   Date  2021  Score 56/80    Latex Allergy:  [x]NO      []YES  Preferred Language for Healthcare:   [x]English       []other:    RESTRICTIONS/PRECAUTIONS:      SUBJECTIVE: Pt reports that he has been bad  and Monday with 6/10 pain. Pt reports he as been doing his normal activity. Difficulty with ER and holding into ER. Pain level:  1-2/10 now worst 6/10; end 1/10    Plan Moving Forward/ For next visit:     Improve Scapular stability    Address posture deficits     Improve Rotator Cuff strength/function       OBJECTIVE: See eval    Exercises/Interventions:     Exercises in bold performed in department today. Items not bolded are carried forward from prior visits for continuity of the record. Exercise/Equipment Resistance/Repetitions HEP Other comments     Scapular Squeezes 2x10 bilat [x] \"down and back cue\"   ER in sidelying 2x10 bilat Instructed to decrease range  Mid and low Rows w/ scap squeeze 2x10 bilat reviewed, reduced this AM secndary to popping. [x] GTB     Supraspinatus lifts  2x10 bilat [x] 2# shorten range in painfree range     Eccentric curls 2x10 bilat [x]    Wall ball push ABCs2 ABCs bilat[]    Bilat UE PNF   []      LS and UT stretch  3x30 sec ea [x]        TrP to ER/SS/biceps tendon   []     inferior glide with towel   reviewed []      Supine flexion to shoulder height 1x10  []      Seated cervical retraction with forearms on knee 1x10 []     ER B with theraband red in front of body  1x10 [] Instructed to start close and increase as able       []        []        []      UBE  seat 12 L 1  2 minutes forward and 2 minutes backward []      Therapeutic Exercise/Home Exercise Program: 36  minutes  See above  Reviewed HEP   Access code [email or web address]  PROM right UE  PROM: flexion 169, abduction 170, ER 90 degrees at 90 degrees abduction, IR 85 at 85 degrees abduction  AROM (before manual): flexion 130 pain but can progress to 155; abduction 110 to 144 pain from 3/10; ER T2 B 0/10; IR B mid thoracic. Strength: flexion 5/5, abd 4+/5, ER 4-/5, IR 5/5 in available range        Therapeutic Activity:  0 minutes     Gait: 0 minutes    Neuromuscular Re-Education:  0 minutes      Canalith Repositioning Procedure:      Manual Therapy:  15 minutes  Scapular mobilization R; glenohumeral joint mobilization of right shoulder; AC mobilization. Rhythmic stabilization ER/IR and 90/90 shoulder flexion 3 x30      Modalities: 0 minutes plus set up  US 1.5 w/cm2, 50%, 8 minutes rotator cuff region  Held secondary to pt had limited time. HEP:   Access Code: [email or web address]  URL: [email or web address]  Date: 10/18/2021      Exercises  Seated Upper Trapezius Stretch - 1 x daily - 4 x weekly - 3 sets - 30 hold  Seated Levator Scapulae Stretch - 1 x daily - 4 x weekly - 3 sets - 30 hold  Seated Scapular Retraction - 1 x daily - 4 x weekly - 3 sets - 10 reps - 5 hold  Shoulder External Rotation with Anchored Resistance - 1 x daily - 4 x weekly - 3 sets - 10 reps  Standing Shoulder Internal Rotation with Anchored Resistance - 1 x daily - 4 x weekly - 3 sets - 10 reps  Standing Shoulder Row with Anchored Resistance - 1 x daily - 4 x weekly - 3 sets - 10 reps - green band resistance  Standing Single Arm Eccentric Bicep Curl Supinated - 1 x daily - 4 x weekly - 3 sets - 10 reps     ASSESSMENT:  Pt demonstrates improvement with PROM without distraction. Goals:   Patient stated goal: Address shoulder pain and prevent surgery   [x]? Progressing: []? Met: []? Not Met: []? Adjusted     Therapist goals for Patient:   Short Term Goals: To be achieved in: 2 weeks  1. Independent in HEP and progression per patient tolerance, in order to prevent re-injury. []? Progressing: [x]? Met: []? Not Met: []? Adjusted  2. Patient will have a decrease in pain to facilitate improvement in movement, function, and ADLs as indicated by Functional Deficits. []? Progressing: [x]? Met: []? Not Met: []? Adjusted     Long Term Goals: To be achieved in: 10 weeks  1. UEFI of 15/80 or less  to assist with reaching prior level of function. [x]? Progressing: []? Met: []? Not Met: []? Adjusted  2. Patient will demonstrate increased AROM to 90% to allow for proper joint functioning as indicated by Functional Deficits. [x]? Progressing: []? Met: []? Not Met: []? Adjusted  3. Patient will demonstrate an increase in NM recruitment/activation and overall GH and scapular strength to 4+/5 or greater, for proper functional mobility as indicated by patients Functional Deficits. [x]? Progressing: []? Met: []? Not Met: []? Adjusted  4. Patient will return to Functional activities without increased symptoms or restriction. [x]? Progressing: []? Met: []? Not Met: []? Adjusted  5. Pt will return to all work/volunteer activities without increased symptoms or restriction. [x]? Progressing: []? Met: []? Not Met: []?  Adjusted      Overall Progression Towards Functional goals/ Treatment Progress Update:  [x] Patient is Reviewed/Progressed HEP activities related to strengthening, flexibility, endurance, ROM of core, proximal hip and LE for functional self-care, mobility, lifting and ambulation   [] (10511) Reviewed/Progressed HEP activities related to improving balance, coordination, kinesthetic sense, posture, motor skill, proprioception of core, proximal hip and LE for self care, mobility, lifting, and ambulation      Manual Treatments:  PROM / STM / Oscillations-Mobs:  G-I, II, III, IV (PA's, Inf., Post.)  [x] (69438) Provided manual therapy to mobilize proximal hip and LS spine soft tissue/joints for the purpose of modulating pain, promoting relaxation,  increasing ROM, reducing/eliminating soft tissue swelling/inflammation/restriction, improving soft tissue extensibility and allowing for proper ROM for normal function with self care, mobility, lifting and ambulation. []CRP:  Canalith Repositioning procedure for the assessment, treatment and education of BPPV    Modalities:   US    Charges:  Timed Code Treatment Minutes: 51   Total Treatment Minutes: 51     Medicare Cap total YTD:        [x]N/A  Workers Comp Time Stamp  (Per CPT and Total Treatment) [x]N/A       [] EVAL    [] Dry Needling  [x] DQ(33767)   x  2  [] EStim Unattended 13103  [] NMR (36079)  x     [] Estim Attended  61616  [x] Manual (58202)  x  1  [] Mechanical Txn 61476  [] TA    x     [x] Ultrasound  [] Gait   x  [] Vaso  [] CRP    [] Ionto           [] Other:        Electronically signed by: Devante Padilla PT, [id number]      Note: If patient does not return for scheduled/ recommended follow up visits, this note will serve as a discharge from care along with most recent update on progress.

## 2021-12-14 ENCOUNTER — HOSPITAL ENCOUNTER (OUTPATIENT)
Dept: PHYSICAL THERAPY | Age: 71
Setting detail: THERAPIES SERIES
Discharge: HOME | End: 2021-12-14
Payer: MEDICARE

## 2021-12-14 PROCEDURE — 97035 APP MDLTY 1+ULTRASOUND EA 15: CPT

## 2021-12-14 PROCEDURE — 97110 THERAPEUTIC EXERCISES: CPT

## 2021-12-14 PROCEDURE — 97140 MANUAL THERAPY 1/> REGIONS: CPT

## 2021-12-14 NOTE — FLOWSHEET NOTE
Physical Therapy Daily Treatment Note    [x]Daily Tx Note    []Progress Note    [] Discharge Summary         Date:  2021    Patient Name:  Cirilo Kent  \"Mac\"   :  1950  MRN: [id number]      Medical/Treatment Diagnosis Information:  ·  Rotator Cuff tendinitis M75.81, Right    Insurance/Certification information:   [address] Medicare Supp    Physician Information:   Jana Gan DO    Plan of care signed :  []  Yes  [x] No  [x]  Cosign []  Fax    Date of Patient follow up with Physician:     Is this a Progress Report:     [x]  Yes  []  No      If Yes:  Date Range for reporting period:  Beginning 2021   Ending 2021    Progress report will be due (10 Rx or 30 days whichever is less):     15/57/8492  Recertification will be due (POC Duration  / 90 days whichever is less):  2022      Visit # Insurance Allowable Auth Required   15/ 20 BMN []  Yes [x]  No        Functional Scale:  UEFI  NV   Date  2021  Score 56/80    Latex Allergy:  [x]NO      []YES  Preferred Language for Healthcare:   [x]English       []other:    RESTRICTIONS/PRECAUTIONS:      SUBJECTIVE:  Pt reports that he is still having difficulty with ER of right shoulder. Pain level:  0.5/10 rest with movement ranges up to 4/10; end 1/10    Plan Moving Forward/ For next visit:     Improve Scapular stability    Address posture deficits     Improve Rotator Cuff strength/function       OBJECTIVE: See eval    Exercises/Interventions:     Exercises in bold performed in department today. Items not bolded are carried forward from prior visits for continuity of the record. Exercise/Equipment Resistance/Repetitions HEP Other comments     Scapular Squeezes 2x10 bilat [x] \"down and back cue\"   ER in sidelying 1x10 bilat Instructed to decrease range  Mid and low Rows w/ scap squeeze 2x10 bilat reviewed, reduced this AM secndary to popping.   [x] GTB     Supraspinatus lifts  2x10 bilat [x] 2# shorten range in painfree range     Eccentric curls 2x10 bilat [x]    Wall ball push ABCs2 ABCs bilat[]    Bilat UE PNF   []      LS and UT stretch  3x30 sec ea [x]        TrP to ER/SS/biceps tendon   []     inferior glide with towel   reviewed []      Supine flexion to shoulder height 1x10  []      Seated cervical retraction with forearms on knee 1x10 []     ER B with theraband red in front of body  1x10 [] Instructed to start close and increase as able   pec stretch 3 reps hold 10 secs []    Adduction theraband red   1x10 []        []      UBE  seat 12 L 1  2 minutes forward and 2 minutes backward []      Therapeutic Exercise/Home Exercise Program: 20 minutes  See above  Reviewed HEP   Access code [email or web address]  PROM right UE        Therapeutic Activity:  0 minutes     Gait: 0 minutes    Neuromuscular Re-Education:  0 minutes      Canalith Repositioning Procedure:      Manual Therapy:  15 minutes  Scapular mobilization R; glenohumeral joint mobilization of right shoulder; AC mobilization. Rhythmic stabilization ER/IR and 90/90 shoulder flexion 3 x30      Modalities: 15 minutes plus set up  US 1.5 w/cm2, 50%, 8 minutes rotator cuff region in slight ER    HEP:   Access Code: [email or web address]  URL: [email or web address]  Date: 10/18/2021      Exercises  Seated Upper Trapezius Stretch - 1 x daily - 4 x weekly - 3 sets - 30 hold  Seated Levator Scapulae Stretch - 1 x daily - 4 x weekly - 3 sets - 30 hold  Seated Scapular Retraction - 1 x daily - 4 x weekly - 3 sets - 10 reps - 5 hold  Shoulder External Rotation with Anchored Resistance - 1 x daily - 4 x weekly - 3 sets - 10 reps  Standing Shoulder Internal Rotation with Anchored Resistance - 1 x daily - 4 x weekly - 3 sets - 10 reps  Standing Shoulder Row with Anchored Resistance - 1 x daily - 4 x weekly - 3 sets - 10 reps - green band resistance  Standing Single Arm Eccentric Bicep Curl Supinated - 1 x daily - 4 x weekly - 3 sets - 10 reps     ASSESSMENT:  Pt demonstrates as expected and requires additional follow up with physician  [] Other    Prognosis for POC: [x] Good [] Fair  [] Poor    Patient requires continued skilled intervention: [x] Yes  [] No    Treatment/Activity Tolerance:  [x] Patient able to complete treatment  [] Patient limited by fatigue  [] Patient limited by pain    [] Patient limited by other medical complications  [] Other:       PLAN: See eval  [] Continue per plan of care [x] Alter current plan (see comments above)  [] Plan of care initiated [] Hold pending MD visit [] Discharge  Will continue 2 times per week for 2-3 more weeks to progress exercise, US and manual treatment. Therapeutic Exercise and NMR EXR  [x] (91422) Provided verbal/tactile cueing for activities related to strengthening, flexibility, endurance, ROM  for improvements in proximal strength and core control with self care, mobility, lifting and ambulation.  [] (56918) Provided verbal/tactile cueing for activities related to improving balance, coordination, kinesthetic sense, posture, motor skill, proprioception  to assist with core control in self care, mobility, lifting, and ambulation.      Therapeutic Activities and Gait:    [] (83042 or 01323) Provided verbal/tactile cueing for activities related to improving balance, coordination, kinesthetic sense, posture, motor skill, proprioception and motor activation to allow for proper function  with self care and ADLs  [] (06986) Provided training and instruction to the patient for proper core and proximal hip recruitment and positioning with ambulation re-education     Home Exercise Program:    [x] (04252) Reviewed/Progressed HEP activities related to strengthening, flexibility, endurance, ROM of core, proximal hip and LE for functional self-care, mobility, lifting and ambulation   [] (58795) Reviewed/Progressed HEP activities related to improving balance, coordination, kinesthetic sense, posture, motor skill, proprioception of core, proximal hip and LE for self care, mobility, lifting, and ambulation      Manual Treatments:  PROM / STM / Oscillations-Mobs:  G-I, II, III, IV (PA's, Inf., Post.)  [x] (73028) Provided manual therapy to mobilize proximal hip and LS spine soft tissue/joints for the purpose of modulating pain, promoting relaxation,  increasing ROM, reducing/eliminating soft tissue swelling/inflammation/restriction, improving soft tissue extensibility and allowing for proper ROM for normal function with self care, mobility, lifting and ambulation. []CRP:  Canalith Repositioning procedure for the assessment, treatment and education of BPPV    Modalities:   US  (correction not performed on 12/2/2021)    Charges:  Timed Code Treatment Minutes: 50   Total Treatment Minutes: 50     Medicare Cap total YTD:        [x]N/A  Workers Comp Time Stamp  (Per CPT and Total Treatment) [x]N/A       [] EVAL    [] Dry Needling  [x] SB(06510)   x  1  [] EStim Unattended 05921  [] NMR (98790)  x     [] Estim Attended  60179  [x] Manual (84129)  x  1  [] Mechanical Txn 24200  [] TA    x     [x] Ultrasound (correction not performed on 12/2/2021)  [] Gait   x  [] Vaso  [] CRP    [] Ionto           [] Other:        Electronically signed by: Gideon Lozano PT, [id number]      Note: If patient does not return for scheduled/ recommended follow up visits, this note will serve as a discharge from care along with most recent update on progress.

## 2021-12-16 ENCOUNTER — HOSPITAL ENCOUNTER (OUTPATIENT)
Dept: PHYSICAL THERAPY | Age: 71
Setting detail: THERAPIES SERIES
Discharge: HOME | End: 2021-12-16
Payer: MEDICARE

## 2021-12-16 PROCEDURE — 97140 MANUAL THERAPY 1/> REGIONS: CPT

## 2021-12-16 PROCEDURE — 97035 APP MDLTY 1+ULTRASOUND EA 15: CPT

## 2021-12-16 PROCEDURE — 97110 THERAPEUTIC EXERCISES: CPT

## 2021-12-16 NOTE — FLOWSHEET NOTE
Physical Therapy Daily Treatment Note    [x]Daily Tx Note    []Progress Note    [] Discharge Summary         Date:  2021    Patient Name:  Davidson Coon  \"Mac\"   :  1950  MRN: [id number]      Medical/Treatment Diagnosis Information:  ·  Rotator Cuff tendinitis M75.81, Right    Insurance/Certification information:   [address] Medicare Supp    Physician Information:   Mitchell Hernandez DO    Plan of care signed :  []  Yes  [x] No  [x]  Cosign []  Fax    Date of Patient follow up with Physician:     Is this a Progress Report:     [x]  Yes  []  No      If Yes:  Date Range for reporting period:  Beginning 2021   Ending 2021    Progress report will be due (10 Rx or 30 days whichever is less):       Recertification will be due (POC Duration  / 90 days whichever is less):  2022      Visit # Insurance Allowable Auth Required    BMN []  Yes [x]  No        Functional Scale:  UEFI    Date  2021  Score 55/80    Latex Allergy:  [x]NO      []YES  Preferred Language for Healthcare:   [x]English       []other:    RESTRICTIONS/PRECAUTIONS:      SUBJECTIVE:  Pt reports that he feels that he is doing better since last visit. Pain level:  0.02/10 rest with movement ranges up to 0/10; putting on coat; end 1/10    Plan Moving Forward/ For next visit:     Improve Scapular stability    Address posture deficits     Improve Rotator Cuff strength/function       OBJECTIVE: See eval    Exercises/Interventions:     Exercises in bold performed in department today. Items not bolded are carried forward from prior visits for continuity of the record. Exercise/Equipment Resistance/Repetitions HEP Other comments     Scapular Squeezes 2x10 bilat [x] \"down and back cue\"   ER in sidelying 1x10 bilat Instructed to decrease range  Mid and low Rows w/ scap squeeze 2x10 bilat reviewed, reduced this AM secndary to popping.   [x] GTB     Supraspinatus lifts  2x10 bilat [x] 2# shorten range in painfree range     Eccentric curls 2x10 bilat [x]    Wall ball push ABCs2 ABCs bilat[]    Bilat UE PNF   []      LS and UT stretch  3x30 sec ea [x]        TrP to ER/SS/biceps tendon   []     inferior glide with towel   reviewed []      Supine flexion to shoulder height 1x10  []      Seated cervical retraction with forearms on knee 1x10 []     ER B with theraband red in front of body  1x10 [] Instructed to start close and increase as able   pec stretch 3 reps hold 10 secs []    Adduction theraband red   2x10 []                forearm retraction  1x10 [] Performed last visit for first time     UBE  seat 12 L 1  2 minutes forward and 2 minutes backward []      Therapeutic Exercise/Home Exercise Program: 20 minutes  See above  Reviewed HEP   Access code [email or web address]  PROM right UE        Therapeutic Activity:  0 minutes     Gait: 0 minutes    Neuromuscular Re-Education:  0 minutes      Canalith Repositioning Procedure:      Manual Therapy:  18 minutes  Scapular mobilization R; glenohumeral joint mobilization of right shoulder; AC mobilization. Rhythmic stabilization ER/IR and 90/90 shoulder flexion 3 x30      Modalities: 10 minutes plus set up  US 1.5 w/cm2, 50%, 8 minutes rotator cuff region in slight ER    HEP:   Access Code: [email or web address]  URL: [email or web address]  Date: 10/18/2021      Exercises  Seated Upper Trapezius Stretch - 1 x daily - 4 x weekly - 3 sets - 30 hold  Seated Levator Scapulae Stretch - 1 x daily - 4 x weekly - 3 sets - 30 hold  Seated Scapular Retraction - 1 x daily - 4 x weekly - 3 sets - 10 reps - 5 hold  Shoulder External Rotation with Anchored Resistance - 1 x daily - 4 x weekly - 3 sets - 10 reps  Standing Shoulder Internal Rotation with Anchored Resistance - 1 x daily - 4 x weekly - 3 sets - 10 reps  Standing Shoulder Row with Anchored Resistance - 1 x daily - 4 x weekly - 3 sets - 10 reps - green band resistance  Standing Single Arm Eccentric Bicep Curl Supinated - 1 x daily - 4 x weekly - 3 sets - 10 reps     ASSESSMENT:  Pt demonstrates improvement with PROM without distraction. Goals:   Patient stated goal: Address shoulder pain and prevent surgery   [x]? Progressing: []? Met: []? Not Met: []? Adjusted     Therapist goals for Patient:   Short Term Goals: To be achieved in: 2 weeks  1. Independent in HEP and progression per patient tolerance, in order to prevent re-injury. []? Progressing: [x]? Met: []? Not Met: []? Adjusted  2. Patient will have a decrease in pain to facilitate improvement in movement, function, and ADLs as indicated by Functional Deficits. []? Progressing: [x]? Met: []? Not Met: []? Adjusted     Long Term Goals: To be achieved in: 10 weeks  1. UEFI of 15/80 or less  to assist with reaching prior level of function. [x]? Progressing: []? Met: []? Not Met: []? Adjusted  2. Patient will demonstrate increased AROM to 90% to allow for proper joint functioning as indicated by Functional Deficits. [x]? Progressing: []? Met: []? Not Met: []? Adjusted  3. Patient will demonstrate an increase in NM recruitment/activation and overall GH and scapular strength to 4+/5 or greater, for proper functional mobility as indicated by patients Functional Deficits. [x]? Progressing: []? Met: []? Not Met: []? Adjusted  4. Patient will return to Functional activities without increased symptoms or restriction. [x]? Progressing: []? Met: []? Not Met: []? Adjusted  5. Pt will return to all work/volunteer activities without increased symptoms or restriction. [x]? Progressing: []? Met: []? Not Met: []? Adjusted      Overall Progression Towards Functional goals/ Treatment Progress Update:  [x] Patient is progressing as expected towards functional goals listed. [] Progression is slowed due to complexities/Impairments listed. [] Progression has been slowed due to co-morbidities.   [] Plan just implemented, too soon to assess goals progression <30days   [] Goals require adjustment due to lack of progress  [] Patient is not progressing as expected and requires additional follow up with physician  [] Other    Prognosis for POC: [x] Good [] Fair  [] Poor    Patient requires continued skilled intervention: [x] Yes  [] No    Treatment/Activity Tolerance:  [x] Patient able to complete treatment  [] Patient limited by fatigue  [] Patient limited by pain    [] Patient limited by other medical complications  [] Other:       PLAN: See eval  [] Continue per plan of care [x] Alter current plan (see comments above)  [] Plan of care initiated [] Hold pending MD visit [] Discharge  Will continue 2 times per week for 2-3 more weeks to progress exercise, US and manual treatment. Therapeutic Exercise and NMR EXR  [x] (82918) Provided verbal/tactile cueing for activities related to strengthening, flexibility, endurance, ROM  for improvements in proximal strength and core control with self care, mobility, lifting and ambulation.  [] (92017) Provided verbal/tactile cueing for activities related to improving balance, coordination, kinesthetic sense, posture, motor skill, proprioception  to assist with core control in self care, mobility, lifting, and ambulation.      Therapeutic Activities and Gait:    [] (48755 or 58178) Provided verbal/tactile cueing for activities related to improving balance, coordination, kinesthetic sense, posture, motor skill, proprioception and motor activation to allow for proper function  with self care and ADLs  [] (03965) Provided training and instruction to the patient for proper core and proximal hip recruitment and positioning with ambulation re-education     Home Exercise Program:    [x] (76424) Reviewed/Progressed HEP activities related to strengthening, flexibility, endurance, ROM of core, proximal hip and LE for functional self-care, mobility, lifting and ambulation   [] (95243) Reviewed/Progressed HEP activities related to improving balance, coordination, kinesthetic sense, posture, motor skill, proprioception of core, proximal hip and LE for self care, mobility, lifting, and ambulation      Manual Treatments:  PROM / STM / Oscillations-Mobs:  G-I, II, III, IV (PA's, Inf., Post.)  [x] (91634) Provided manual therapy to mobilize proximal hip and LS spine soft tissue/joints for the purpose of modulating pain, promoting relaxation,  increasing ROM, reducing/eliminating soft tissue swelling/inflammation/restriction, improving soft tissue extensibility and allowing for proper ROM for normal function with self care, mobility, lifting and ambulation. []CRP:  Canalith Repositioning procedure for the assessment, treatment and education of BPPV    Modalities:   US  (correction not performed on 12/2/2021)    Charges:  Timed Code Treatment Minutes: 48   Total Treatment Minutes: 48     Medicare Cap total YTD:        [x]N/A  Workers Comp Time Stamp  (Per CPT and Total Treatment) [x]N/A       [] EVAL    [] Dry Needling  [x] SF(32371)   x  1  [] EStim Unattended 46527  [] NMR (91503)  x     [] Estim Attended  96539  [x] Manual (21701)  x  1  [] Mechanical Txn 31522  [] TA    x     [x] Ultrasound (correction not performed on 12/2/2021)  [] Gait   x  [] Vaso  [] CRP    [] Ionto           [] Other:        Electronically signed by: Luisana Timmons PT, [id number]      Note: If patient does not return for scheduled/ recommended follow up visits, this note will serve as a discharge from care along with most recent update on progress.

## 2021-12-21 ENCOUNTER — HOSPITAL ENCOUNTER (OUTPATIENT)
Dept: PHYSICAL THERAPY | Age: 71
Setting detail: THERAPIES SERIES
Discharge: HOME | End: 2021-12-21
Payer: MEDICARE

## 2021-12-21 PROCEDURE — 97035 APP MDLTY 1+ULTRASOUND EA 15: CPT

## 2021-12-21 PROCEDURE — 97110 THERAPEUTIC EXERCISES: CPT

## 2021-12-21 PROCEDURE — 97140 MANUAL THERAPY 1/> REGIONS: CPT

## 2021-12-21 NOTE — PROGRESS NOTES
Physical Therapy Daily Treatment Note    [x]Daily Tx Note    []Progress Note    [] Discharge Summary         Date:  2021    Patient Name:  Jordyn Mann  \"Mac\"   :  1950  MRN: [id number]      Medical/Treatment Diagnosis Information:  ·  Rotator Cuff tendinitis M75.81, Right    Insurance/Certification information:   [address] Medicare Supp    Physician Information:   Devaughn Romero,     Plan of care signed :  []  Yes  [x] No  [x]  Cosign []  Fax    Date of Patient follow up with Physician:     Is this a Progress Report:     [x]  Yes  []  No      If Yes:  Date Range for reporting period:  Beginning 2021   Ending 2021    Progress report will be due (10 Rx or 30 days whichever is less):       Recertification will be due (POC Duration  / 90 days whichever is less): 3/2/2022      Visit # Insurance Allowable Auth Required    BMN []  Yes [x]  No        Functional Scale:  UEFI    Date  2021  Score 66/80    Latex Allergy:  [x]NO      []YES  Preferred Language for Healthcare:   [x]English       []other:    RESTRICTIONS/PRECAUTIONS:      SUBJECTIVE:  Pt reports that he feels that he is doing better since last visit. Pain level:  0/10 rest with movement ranges up to 0-1/10; putting on coat; end 1/10    Plan Moving Forward/ For next visit:     Improve Scapular stability    Address posture deficits     Improve Rotator Cuff strength/function       OBJECTIVE: See eval    Exercises/Interventions:     Exercises in bold performed in department today. Items not bolded are carried forward from prior visits for continuity of the record. Exercise/Equipment Resistance/Repetitions HEP Other comments     Scapular Squeezes 2x10 bilat [x] \"down and back cue\"   ER in sidelying 1x10 Instructed to decrease range  Mid and low Rows w/ scap squeeze 2x10 bilat reviewed, reduced this AM secndary to popping.   [x] GTB     Supraspinatus lifts  2x10 bilat [x] 2# shorten range in painfree range     Eccentric curls 2x10 bilat [x]    Wall ball push ABCs2 ABCs bilat[]    Bilat UE PNF   []      LS and UT stretch  3x30 sec ea [x]        TrP to ER/SS/biceps tendon   []     inferior glide with towel   reviewed []      Supine flexion to shoulder height 1x10  []      Seated cervical retraction with forearms on knee 1x10 []     ER B with theraband red in front of body  1x10 [] Instructed to start close and increase as able   pec stretch 3 reps hold 10 secs []    Adduction theraband red   2x10 []                forearm retraction  1x10 [] Performed last visit for first time     UBE  seat 12 L 1  2 minutes forward and 2 minutes backward []      Therapeutic Exercise/Home Exercise Program: 18 minutes  See above  Reviewed HEP   Access code [email or web address]  PROM right UE  PROM: flexion 165 then 171 with 0-1/10 discomfort; abd 149 then 166 with 0-1/10 discomfort; ER 85 at 90 degrees abduction; IR 80 at 85 degrees. AROM: flexion 135; abd 153; ER T3; IR mid thoracic  Strength: flexion 5/5, abduction 4+/5, ER 4/5 with discomfort; IR 5/5       Therapeutic Activity:  0 minutes     Gait: 0 minutes    Neuromuscular Re-Education:  0 minutes      Canalith Repositioning Procedure:      Manual Therapy:  14 minutes  Scapular mobilization R; glenohumeral joint mobilization of right shoulder; AC mobilization. Rhythmic stabilization ER/IR (with slight discomfort in forearm) and 90/90 shoulder flexion 3 x30      Modalities: 10 minutes plus set up  US 1.5 w/cm2, 50%, 8 minutes rotator cuff region in slight ER    HEP:   Access Code: [email or web address]  URL: [email or web address]  Date: 10/18/2021      Exercises  Seated Upper Trapezius Stretch - 1 x daily - 4 x weekly - 3 sets - 30 hold  Seated Levator Scapulae Stretch - 1 x daily - 4 x weekly - 3 sets - 30 hold  Seated Scapular Retraction - 1 x daily - 4 x weekly - 3 sets - 10 reps - 5 hold  Shoulder External Rotation with Anchored Resistance - 1 x daily - 4 x weekly - 3 sets - 10 reps  Standing Shoulder Internal Rotation with Anchored Resistance - 1 x daily - 4 x weekly - 3 sets - 10 reps  Standing Shoulder Row with Anchored Resistance - 1 x daily - 4 x weekly - 3 sets - 10 reps - green band resistance  Standing Single Arm Eccentric Bicep Curl Supinated - 1 x daily - 4 x weekly - 3 sets - 10 reps     ASSESSMENT:  Pt demonstrates improvement with PROM without distraction. Goals:   Patient stated goal: Address shoulder pain and prevent surgery   [x]? Progressing: []? Met: []? Not Met: []? Adjusted     Therapist goals for Patient:   Short Term Goals: To be achieved in: 2 weeks  1. Independent in HEP and progression per patient tolerance, in order to prevent re-injury. []? Progressing: [x]? Met: []? Not Met: []? Adjusted  2. Patient will have a decrease in pain to facilitate improvement in movement, function, and ADLs as indicated by Functional Deficits. []? Progressing: [x]? Met: []? Not Met: []? Adjusted     Long Term Goals: To be achieved in: 10 weeks  1. UEFI of 15/80 or less  to assist with reaching prior level of function. [x]? Progressing: []? Met: []? Not Met: []? Adjusted  2. Patient will demonstrate increased AROM to 90% to allow for proper joint functioning as indicated by Functional Deficits. [x]? Progressing: []? Met: []? Not Met: []? Adjusted  3. Patient will demonstrate an increase in NM recruitment/activation and overall GH and scapular strength to 4+/5 or greater, for proper functional mobility as indicated by patients Functional Deficits. [x]? Progressing: []? Met: []? Not Met: []? Adjusted  4. Patient will return to Functional activities without increased symptoms or restriction. [x]? Progressing: []? Met: []? Not Met: []? Adjusted  5. Pt will return to all work/volunteer activities without increased symptoms or restriction. [x]? Progressing: []? Met: []? Not Met: []?  Adjusted      Overall Progression Towards Functional goals/ Treatment Progress Update:  [x] Patient is progressing as expected towards functional goals listed. [] Progression is slowed due to complexities/Impairments listed. [] Progression has been slowed due to co-morbidities. [] Plan just implemented, too soon to assess goals progression <30days   [] Goals require adjustment due to lack of progress  [] Patient is not progressing as expected and requires additional follow up with physician  [] Other    Prognosis for POC: [x] Good [] Fair  [] Poor    Patient requires continued skilled intervention: [x] Yes  [] No    Treatment/Activity Tolerance:  [x] Patient able to complete treatment  [] Patient limited by fatigue  [] Patient limited by pain    [] Patient limited by other medical complications  [] Other:       PLAN: See eval  [] Continue per plan of care [x] Alter current plan (see comments above)  [] Plan of care initiated [] Hold pending MD visit [] Discharge  Will continue 1-2 times per week for 2-3 more weeks to progress exercise, US and manual treatment. Therapeutic Exercise and NMR EXR  [x] (80161) Provided verbal/tactile cueing for activities related to strengthening, flexibility, endurance, ROM  for improvements in proximal strength and core control with self care, mobility, lifting and ambulation.  [] (17576) Provided verbal/tactile cueing for activities related to improving balance, coordination, kinesthetic sense, posture, motor skill, proprioception  to assist with core control in self care, mobility, lifting, and ambulation.      Therapeutic Activities and Gait:    [] (45955 or 38492) Provided verbal/tactile cueing for activities related to improving balance, coordination, kinesthetic sense, posture, motor skill, proprioception and motor activation to allow for proper function  with self care and ADLs  [] (91778) Provided training and instruction to the patient for proper core and proximal hip recruitment and positioning with ambulation re-education     Home

## 2022-01-05 NOTE — PROGRESS NOTES
Physical Therapy  Called and left a voicemail for patient to inform patient that we needed to cancel appointment for tomorrow due to therapist being ill.

## 2022-01-06 ENCOUNTER — HOSPITAL ENCOUNTER (OUTPATIENT)
Dept: PHYSICAL THERAPY | Age: 72
Setting detail: THERAPIES SERIES
Discharge: HOME | End: 2022-01-06
Payer: MEDICARE

## 2022-01-13 ENCOUNTER — HOSPITAL ENCOUNTER (OUTPATIENT)
Dept: PHYSICAL THERAPY | Age: 72
Setting detail: THERAPIES SERIES
Discharge: HOME | End: 2022-01-13
Payer: MEDICARE

## 2022-01-13 PROCEDURE — 97140 MANUAL THERAPY 1/> REGIONS: CPT

## 2022-01-13 PROCEDURE — 97035 APP MDLTY 1+ULTRASOUND EA 15: CPT

## 2022-01-13 PROCEDURE — 97110 THERAPEUTIC EXERCISES: CPT

## 2022-01-13 NOTE — PROGRESS NOTES
Physical Therapy Daily Treatment Note    [x]Daily Tx Note    [x]Progress Note    [] Discharge Summary         Date:  2022    Patient Name:  Kelly Olivas  \"Mac\"   :  1950  MRN: [id number]      Medical/Treatment Diagnosis Information:  ·  Rotator Cuff tendinitis M75.81, Right    Insurance/Certification information:   [address] Medicare Supp    Physician Information:   Siobhan Fields DO    Plan of care signed :  []  Yes  [x] No  [x]  Cosign []  Fax    Date of Patient follow up with Physician:     Is this a Progress Report:     [x]  Yes  []  No      If Yes:  Date Range for reporting period:  Beginning 2021   Ending 2022    Progress report will be due (10 Rx or 30 days whichever is less):     2553  Recertification will be due (POC Duration  / 90 days whichever is less): 3/2/2022      Visit # Insurance Allowable Auth Required    BMN []  Yes [x]  No        Functional Scale:  UEFI    Date  2022  Score 68/80    Latex Allergy:  [x]NO      []YES  Preferred Language for Healthcare:   [x]English       []other:    RESTRICTIONS/PRECAUTIONS:      SUBJECTIVE:  Pt reports that he has had some pain at night mostly with laying on right side, 3-4/10 at worst. Pt reports taht he is having pain with opening up cans (abduction with pressure at wrist). Pain level:  0/10 rest with movement ranges up to 0-1/10, worst 3-4/10; putting on coat; end 1/10    Plan Moving Forward/ For next visit:     Improve Scapular stability    Address posture deficits     Improve Rotator Cuff strength/function       OBJECTIVE: See eval    Exercises/Interventions:     Exercises in bold performed in department today. Items not bolded are carried forward from prior visits for continuity of the record.   Exercise/Equipment Resistance/Repetitions HEP Other comments     Scapular Squeezes 2x10 bilat [x] \"down and back cue\"   ER in sidelying 1x10; 1x10 1 lbInstructed to decrease range  Mid and low Rows w/ scap squeeze 2x10 bilat reviewed, reduced this AM secndary to popping. [x] GTB     Supraspinatus lifts  2x10 bilat [x] 2# shorten range in painfree range     Eccentric curls 2x10 bilat [x]    Wall ball push ABCs2 ABCs bilat[]    Bilat UE PNF   []      LS and UT stretch  3x30 sec ea [x]        TrP to ER/SS/biceps tendon   []     inferior glide with towel   reviewed []      Supine flexion to shoulder height 1x10  []      Seated cervical retraction with forearms on knee 1x10 []     ER B with theraband red in front of body  1x10 [] Instructed to start close and increase as able   pec stretch 3 reps hold 10 secs []    Adduction theraband red   2x10 []                forearm retraction  1x10 [] Performed last visit for first time     UBE  seat 12 L 1  2 minutes forward and 2 minutes backward []      Therapeutic Exercise/Home Exercise Program: 20 minutes  See above  Reviewed HEP   Access code [email or web address]  PROM right UE  PROM: flexion 165 then 174 with 1/10 discomfort; abd 170 with 1/10 discomfort; ER 70 at 90 degrees abduction with discomfort at 1-2/10 at 85 with 2/10; IR 85 at 85 degrees. AROM: flexion 160 without discomfort and 165 with discomfort; abd 164 with some discomfort at 120; ER T3; IR mid thoracic  Strength: flexion 5/5, abduction 5/5, ER 4+/5 with discomfort 1-2/10 on right; IR 5/5       Therapeutic Activity:  0 minutes     Gait: 0 minutes    Neuromuscular Re-Education:  0 minutes      Canalith Repositioning Procedure:      Manual Therapy:  15 minutes  Scapular mobilization R; glenohumeral joint mobilization of right shoulder; AC mobilization. Rhythmic stabilization ER/IR (with slight discomfort in forearm) and 90/90 shoulder flexion 3 x30; contract/relax ER/IR      Modalities: 10 minutes plus set up  US 1.5 w/cm2, 50%, 8 minutes rotator cuff region in slight ER    HEP:   Access Code: [email or web address]  URL: [email or web address]  Date: 10/18/2021      Exercises  Seated Upper Trapezius Stretch - 1 x Functional activities without increased symptoms or restriction. [x]? Progressing: []? Met: []? Not Met: []? Adjusted  5. Pt will return to all work/volunteer activities without increased symptoms or restriction. [x]? Progressing: []? Met: []? Not Met: []? Adjusted      Overall Progression Towards Functional goals/ Treatment Progress Update:  [x] Patient is progressing as expected towards functional goals listed. [] Progression is slowed due to complexities/Impairments listed. [] Progression has been slowed due to co-morbidities. [] Plan just implemented, too soon to assess goals progression <30days   [] Goals require adjustment due to lack of progress  [] Patient is not progressing as expected and requires additional follow up with physician  [] Other    Prognosis for POC: [x] Good [] Fair  [] Poor    Patient requires continued skilled intervention: [x] Yes  [] No    Treatment/Activity Tolerance:  [x] Patient able to complete treatment  [] Patient limited by fatigue  [] Patient limited by pain    [] Patient limited by other medical complications  [] Other:       PLAN: See eval  [] Continue per plan of care [x] Alter current plan (see comments above)  [] Plan of care initiated [] Hold pending MD visit [] Discharge  Will continue 1-2 times per week for 2 more weeks to progress exercise, US and manual treatment. Therapeutic Exercise and NMR EXR  [x] (68032) Provided verbal/tactile cueing for activities related to strengthening, flexibility, endurance, ROM  for improvements in proximal strength and core control with self care, mobility, lifting and ambulation.  [] (87468) Provided verbal/tactile cueing for activities related to improving balance, coordination, kinesthetic sense, posture, motor skill, proprioception  to assist with core control in self care, mobility, lifting, and ambulation.      Therapeutic Activities and Gait:    [] (22025 or 24703) Provided verbal/tactile cueing for activities related to improving balance, coordination, kinesthetic sense, posture, motor skill, proprioception and motor activation to allow for proper function  with self care and ADLs  [] (10039) Provided training and instruction to the patient for proper core and proximal hip recruitment and positioning with ambulation re-education     Home Exercise Program:    [x] (23671) Reviewed/Progressed HEP activities related to strengthening, flexibility, endurance, ROM of core, proximal hip and LE for functional self-care, mobility, lifting and ambulation   [] (64852) Reviewed/Progressed HEP activities related to improving balance, coordination, kinesthetic sense, posture, motor skill, proprioception of core, proximal hip and LE for self care, mobility, lifting, and ambulation      Manual Treatments:  PROM / STM / Oscillations-Mobs:  G-I, II, III, IV (PA's, Inf., Post.)  [x] (16543) Provided manual therapy to mobilize proximal hip and LS spine soft tissue/joints for the purpose of modulating pain, promoting relaxation,  increasing ROM, reducing/eliminating soft tissue swelling/inflammation/restriction, improving soft tissue extensibility and allowing for proper ROM for normal function with self care, mobility, lifting and ambulation. []CRP:  Canalith Repositioning procedure for the assessment, treatment and education of BPPV    Modalities:   US      Charges:  Timed Code Treatment Minutes: 45   Total Treatment Minutes: 45     Medicare Cap total YTD:        [x]N/A  Workers Comp Time Stamp  (Per CPT and Total Treatment) [x]N/A       [] EVAL    [] Dry Needling  [x] QT(14283)   x  1  [] EStim Unattended 60099  [] NMR (37166)  x     [] Estim Attended  22525  [x] Manual (99142)  x  1  [] Mechanical Txn 51050  [] TA    x     [x] Ultrasound   [] Gait   x  [] Vaso  [] CRP    [] Ionto           [] Other:        Electronically signed by:  Bonnie Wilder PT, [id number]      Note: If patient does not return for scheduled/ recommended follow up visits, this note will serve as a discharge from care along with most recent update on progress.

## 2022-01-20 ENCOUNTER — HOSPITAL ENCOUNTER (OUTPATIENT)
Dept: PHYSICAL THERAPY | Age: 72
Setting detail: THERAPIES SERIES
Discharge: HOME | End: 2022-01-20
Payer: MEDICARE

## 2022-01-20 NOTE — FLOWSHEET NOTE
Physical Therapy  Cancellation/No-show Note  Patient Name:  Theresa Juan  :  1950   Date:  2022  Cancels to Date: 1  No-shows to Date: 0    For today's appointment patient:  [x]  Cancelled  []  Rescheduled appointment  []  No-show     Reason given by patient:  []  Patient ill  []  Conflicting appointment  []  No transportation    []  Conflict with work  []  No reason given  [x]  Other: See registrar note. Comments:      Electronically signed by:   Adriana Navarro, Formerly named [facility], [id number]

## 2022-01-20 NOTE — PROGRESS NOTES
Physical Therapy  Patient called and stated was exposed to covid and was advised to quarantine for 5 days would like to cancel appointment for today 1/20/22

## 2022-01-27 ENCOUNTER — HOSPITAL ENCOUNTER (OUTPATIENT)
Dept: PHYSICAL THERAPY | Age: 72
Setting detail: THERAPIES SERIES
Discharge: HOME | End: 2022-01-27
Payer: MEDICARE

## 2022-01-27 PROCEDURE — 97140 MANUAL THERAPY 1/> REGIONS: CPT

## 2022-01-27 PROCEDURE — 97110 THERAPEUTIC EXERCISES: CPT

## 2022-01-27 NOTE — FLOWSHEET NOTE
Physical Therapy Daily Treatment Note    [x]Daily Tx Note    []Progress Note    [x] Discharge Summary         Date:  2022    Patient Name:  David Cole  \"Mac\"   :  1950  MRN: [id number]      Medical/Treatment Diagnosis Information:  ·  Rotator Cuff tendinitis M75.81, Right    Insurance/Certification information:   [address] Medicare Supp    Physician Information:   Flor Harness, DO    Plan of care signed :  []  Yes  [x] No  [x]  Cosign []  Fax    Date of Patient follow up with Physician:     Is this a Progress Report:     [x]  Yes  []  No      If Yes:  Date Range for reporting period:  Beginning 2022   Ending 2022    Progress report will be due (10 Rx or 30 days whichever is less):     discharged  Recertification will be due (POC Duration  / 90 days whichever is less):       Visit # Insurance Allowable Auth Required    BMN []  Yes [x]  No        Functional Scale:  UEFI    Date  2022  Score 73/80    Latex Allergy:  [x]NO      []YES  Preferred Language for Healthcare:   [x]English       []other:    RESTRICTIONS/PRECAUTIONS:      SUBJECTIVE:  Pt reports that he is still having some issues at times with ER. Pt is back to 97%-98% of normal activity. Pain level:  0/10 rest with movement ranges up to 0-1/10, worst 1/10; end 1/10    Plan Moving Forward/ For next visit:     Improve Scapular stability    Address posture deficits     Improve Rotator Cuff strength/function       OBJECTIVE: See eval    Exercises/Interventions:     Exercises in bold performed in department today. Items not bolded are carried forward from prior visits for continuity of the record. Exercise/Equipment Resistance/Repetitions HEP Other comments     Scapular Squeezes 2x10 bilat [x] \"down and back cue\"   ER in sidelying 1x10; 1x10 1 lbInstructed to decrease range  Mid and low Rows w/ scap squeeze 2x10 bilat reviewed, reduced this AM secndary to popping.   [x] GTB     Supraspinatus lifts  2x10 bilat [x] 2# shorten range in painfree range     Eccentric curls 2x10 bilat [x]    Wall ball push ABCs2 ABCs bilat[]    Bilat UE PNF   []      LS and UT stretch  3x30 sec ea [x]        TrP to ER/SS/biceps tendon   []     inferior glide with towel   reviewed []      Supine flexion to shoulder height 1x10  []      Seated cervical retraction with forearms on knee 1x10 []     ER B with theraband red in front of body  1x10 [] Instructed to start close and increase as able   pec stretch 3 reps hold 10 secs []    Adduction theraband red   2x10 []                forearm retraction  1x10 [] Performed last visit for first time     UBE  seat 12 L 1  2 minutes forward and 2 minutes backward []      Therapeutic Exercise/Home Exercise Program: 32 minutes  See above  Reviewed HEP   Access code [email or web address]  PROM right UE  PROM: flexion 175 discomfort 0.5/10; abd 174; ER 85 at 90 degrees abduction with discomfort at less then 0.5/10; IR 85 at 85 degrees. AROM: flexion 171 very slight discomfort; abd 170 with 1/10 discomfort; ER T3; IR mid thoracic  Strength: flexion 5/5, abduction 5/5, ER 5/5 with discomfort 1.5/10; IR 5/5       Therapeutic Activity:  0 minutes     Gait: 0 minutes    Neuromuscular Re-Education:  0 minutes      Canalith Repositioning Procedure:      Manual Therapy:  0 minutes  Scapular mobilization R; glenohumeral joint mobilization of right shoulder; AC mobilization. Rhythmic stabilization ER/IR (with slight discomfort in forearm) and 90/90 shoulder flexion 3 x30; contract/relax ER/IR      Modalities: 0 minutes plus set up    HEP:   Access Code: [email or web address]  URL: [email or web address]  Date: 10/18/2021      Exercises  Seated Upper Trapezius Stretch - 1 x daily - 4 x weekly - 3 sets - 30 hold  Seated Levator Scapulae Stretch - 1 x daily - 4 x weekly - 3 sets - 30 hold  Seated Scapular Retraction - 1 x daily - 4 x weekly - 3 sets - 10 reps - 5 hold  Shoulder External Rotation with Anchored Resistance - 1 x daily - 4 x weekly - 3 sets - 10 reps  Standing Shoulder Internal Rotation with Anchored Resistance - 1 x daily - 4 x weekly - 3 sets - 10 reps  Standing Shoulder Row with Anchored Resistance - 1 x daily - 4 x weekly - 3 sets - 10 reps - green band resistance  Standing Single Arm Eccentric Bicep Curl Supinated - 1 x daily - 4 x weekly - 3 sets - 10 reps     ASSESSMENT:  Pt demonstrates improvement with PROM without distraction, AROM and strength. Pt has met all goals   Goals:   Patient stated goal: Address shoulder pain and prevent surgery   []? Progressing: [x]? Met: []? Not Met: []? Adjusted     Therapist goals for Patient:   Short Term Goals: To be achieved in: 2 weeks  1. Independent in HEP and progression per patient tolerance, in order to prevent re-injury. []? Progressing: [x]? Met: []? Not Met: []? Adjusted  2. Patient will have a decrease in pain to facilitate improvement in movement, function, and ADLs as indicated by Functional Deficits. []? Progressing: [x]? Met: []? Not Met: []? Adjusted     Long Term Goals: To be achieved in: 10 weeks  1.  to assist with reaching prior level of function. Revised goal: UEFI 70/80 or more (was written incorrectly on IE)   [x]? Progressing: [x]? Met: []? Not Met: []? Adjusted  2. Patient will demonstrate increased AROM to 90% to allow for proper joint functioning as indicated by Functional Deficits. []? Progressing: [x]? Met: []? Not Met: []? Adjusted  3. Patient will demonstrate an increase in NM recruitment/activation and overall GH and scapular strength to 4+/5 or greater, for proper functional mobility as indicated by patients Functional Deficits. []? Progressing: [x]? Met: []? Not Met: []? Adjusted  4. Patient will return to Functional activities without increased symptoms or restriction. []? Progressing: [x]? Met by 98%: []? Not Met: []? Adjusted  5. Pt will return to all work/volunteer activities without increased symptoms or restriction. []? Progressing: [x]? Met: []? Not Met: []? Adjusted      Overall Progression Towards Functional goals/ Treatment Progress Update:  [x] Patient is progressing as expected towards functional goals listed. [] Progression is slowed due to complexities/Impairments listed. [] Progression has been slowed due to co-morbidities. [] Plan just implemented, too soon to assess goals progression <30days   [] Goals require adjustment due to lack of progress  [] Patient is not progressing as expected and requires additional follow up with physician  [] Other    Prognosis for POC: [x] Good [] Fair  [] Poor    Patient requires continued skilled intervention: [] Yes  [x] No    Treatment/Activity Tolerance:  [x] Patient able to complete treatment  [] Patient limited by fatigue  [] Patient limited by pain    [] Patient limited by other medical complications  [] Other:       PLAN: See eval  [] Continue per plan of care [] Alter current plan (see comments above)  [] Plan of care initiated [] Hold pending MD visit [x] Discharge    Therapeutic Exercise and NMR EXR  [x] (02953) Provided verbal/tactile cueing for activities related to strengthening, flexibility, endurance, ROM  for improvements in proximal strength and core control with self care, mobility, lifting and ambulation.  [] (45601) Provided verbal/tactile cueing for activities related to improving balance, coordination, kinesthetic sense, posture, motor skill, proprioception  to assist with core control in self care, mobility, lifting, and ambulation.      Therapeutic Activities and Gait:    [] (64325 or 87571) Provided verbal/tactile cueing for activities related to improving balance, coordination, kinesthetic sense, posture, motor skill, proprioception and motor activation to allow for proper function  with self care and ADLs  [] (42085) Provided training and instruction to the patient for proper core and proximal hip recruitment and positioning with ambulation re-education     Home Exercise Program:    [x] (90544) Reviewed/Progressed HEP activities related to strengthening, flexibility, endurance, ROM of core, proximal hip and LE for functional self-care, mobility, lifting and ambulation   [] (47324) Reviewed/Progressed HEP activities related to improving balance, coordination, kinesthetic sense, posture, motor skill, proprioception of core, proximal hip and LE for self care, mobility, lifting, and ambulation      Manual Treatments:  PROM / STM / Oscillations-Mobs:  G-I, II, III, IV (PA's, Inf., Post.)  [] (26675) Provided manual therapy to mobilize proximal hip and LS spine soft tissue/joints for the purpose of modulating pain, promoting relaxation,  increasing ROM, reducing/eliminating soft tissue swelling/inflammation/restriction, improving soft tissue extensibility and allowing for proper ROM for normal function with self care, mobility, lifting and ambulation. []CRP:  Canalith Repositioning procedure for the assessment, treatment and education of BPPV    Modalities:         Charges:  Timed Code Treatment Minutes: 32   Total Treatment Minutes: 32     Medicare Cap total YTD:        [x]N/A  Workers Comp Time Stamp  (Per CPT and Total Treatment) [x]N/A       [] EVAL    [] Dry Needling  [x] VG(52225)   x  2  [] EStim Unattended 56487  [] NMR (18093)  x     [] Estim Attended  09804  [] Manual (17911)  x    [] Mechanical Txn 12564  [] TA    x     [] Ultrasound   [] Gait   x  [] Vaso  [] CRP    [] Ionto           [] Other:        Electronically signed by: Linh Mariano PT, [id number]      Note: If patient does not return for scheduled/ recommended follow up visits, this note will serve as a discharge from care along with most recent update on progress.

## 2022-10-26 ENCOUNTER — HOSPITAL ENCOUNTER (INPATIENT)
Age: 72
LOS: 1 days | Discharge: HOME | DRG: 066 | End: 2022-10-27
Attending: EMERGENCY MEDICINE | Admitting: INTERNAL MEDICINE
Payer: MEDICARE

## 2022-10-26 ENCOUNTER — APPOINTMENT (OUTPATIENT)
Dept: MRI IMAGING | Age: 72
DRG: 066 | End: 2022-10-26
Payer: MEDICARE

## 2022-10-26 ENCOUNTER — APPOINTMENT (OUTPATIENT)
Dept: CT IMAGING | Age: 72
DRG: 066 | End: 2022-10-26
Payer: MEDICARE

## 2022-10-26 DIAGNOSIS — I67.9 SMALL VESSEL DISEASE, CEREBROVASCULAR: ICD-10-CM

## 2022-10-26 DIAGNOSIS — I66.09 OCCLUSION AND STENOSIS OF UNSPECIFIED MIDDLE CEREBRAL ARTERY: ICD-10-CM

## 2022-10-26 DIAGNOSIS — R47.1 DYSARTHRIA: Primary | ICD-10-CM

## 2022-10-26 DIAGNOSIS — R29.90 STROKE-LIKE SYMPTOM: ICD-10-CM

## 2022-10-26 PROBLEM — I10 HTN (HYPERTENSION): Status: ACTIVE | Noted: 2022-10-26

## 2022-10-26 PROBLEM — C44.90 SKIN CANCER: Status: ACTIVE | Noted: 2022-10-26

## 2022-10-26 PROBLEM — I63.9 CVA (CEREBRAL VASCULAR ACCIDENT) (HCC): Status: ACTIVE | Noted: 2022-10-26

## 2022-10-26 PROBLEM — E11.9 DM2 (DIABETES MELLITUS, TYPE 2) (HCC): Status: ACTIVE | Noted: 2022-10-26

## 2022-10-26 PROBLEM — I48.0 PAROXYSMAL ATRIAL FIBRILLATION (HCC): Status: ACTIVE | Noted: 2022-10-26

## 2022-10-26 PROBLEM — G45.9 TIA (TRANSIENT ISCHEMIC ATTACK): Status: ACTIVE | Noted: 2022-10-26

## 2022-10-26 LAB
A/G RATIO: 1.6 (ref 1.1–2.2)
ALBUMIN SERPL-MCNC: 4.6 G/DL (ref 3.4–5)
ALP BLD-CCNC: 75 U/L (ref 40–129)
ALT SERPL-CCNC: 28 U/L (ref 10–40)
ANION GAP SERPL CALCULATED.3IONS-SCNC: 14 MMOL/L (ref 3–16)
AST SERPL-CCNC: 35 U/L (ref 15–37)
BASOPHILS ABSOLUTE: 0.1 K/UL (ref 0–0.2)
BASOPHILS RELATIVE PERCENT: 1 %
BILIRUB SERPL-MCNC: 0.4 MG/DL (ref 0–1)
BUN BLDV-MCNC: 17 MG/DL (ref 7–20)
CALCIUM SERPL-MCNC: 9.7 MG/DL (ref 8.3–10.6)
CHLORIDE BLD-SCNC: 100 MMOL/L (ref 99–110)
CO2: 27 MMOL/L (ref 21–32)
CREAT SERPL-MCNC: 1 MG/DL (ref 0.8–1.3)
EKG ATRIAL RATE: 64 BPM
EKG DIAGNOSIS: NORMAL
EKG P AXIS: 37 DEGREES
EKG P-R INTERVAL: 184 MS
EKG Q-T INTERVAL: 434 MS
EKG QRS DURATION: 100 MS
EKG QTC CALCULATION (BAZETT): 447 MS
EKG R AXIS: 2 DEGREES
EKG T AXIS: 48 DEGREES
EKG VENTRICULAR RATE: 64 BPM
EOSINOPHILS ABSOLUTE: 0.2 K/UL (ref 0–0.6)
EOSINOPHILS RELATIVE PERCENT: 2.9 %
GFR SERPL CREATININE-BSD FRML MDRD: >60 ML/MIN/{1.73_M2}
GLUCOSE BLD-MCNC: 114 MG/DL (ref 70–99)
GLUCOSE BLD-MCNC: 117 MG/DL (ref 70–99)
GLUCOSE BLD-MCNC: 123 MG/DL (ref 70–99)
GLUCOSE BLD-MCNC: 139 MG/DL (ref 70–99)
GLUCOSE BLD-MCNC: 178 MG/DL (ref 70–99)
HCT VFR BLD CALC: 42.3 % (ref 40.5–52.5)
HEMOGLOBIN: 14.2 G/DL (ref 13.5–17.5)
INR BLD: 1.1 (ref 0.87–1.14)
LYMPHOCYTES ABSOLUTE: 1.3 K/UL (ref 1–5.1)
LYMPHOCYTES RELATIVE PERCENT: 18.7 %
MCH RBC QN AUTO: 29.9 PG (ref 26–34)
MCHC RBC AUTO-ENTMCNC: 33.7 G/DL (ref 31–36)
MCV RBC AUTO: 88.8 FL (ref 80–100)
MONOCYTES ABSOLUTE: 0.5 K/UL (ref 0–1.3)
MONOCYTES RELATIVE PERCENT: 7.4 %
NEUTROPHILS ABSOLUTE: 4.8 K/UL (ref 1.7–7.7)
NEUTROPHILS RELATIVE PERCENT: 70 %
PDW BLD-RTO: 14.4 % (ref 12.4–15.4)
PERFORMED ON: ABNORMAL
PLATELET # BLD: 174 K/UL (ref 135–450)
PMV BLD AUTO: 8.5 FL (ref 5–10.5)
POTASSIUM REFLEX MAGNESIUM: 4.2 MMOL/L (ref 3.5–5.1)
PROTHROMBIN TIME: 14.1 SEC (ref 11.7–14.5)
RBC # BLD: 4.76 M/UL (ref 4.2–5.9)
SODIUM BLD-SCNC: 141 MMOL/L (ref 136–145)
TOTAL PROTEIN: 7.4 G/DL (ref 6.4–8.2)
WBC # BLD: 6.9 K/UL (ref 4–11)

## 2022-10-26 PROCEDURE — 80053 COMPREHEN METABOLIC PANEL: CPT

## 2022-10-26 PROCEDURE — 97165 OT EVAL LOW COMPLEX 30 MIN: CPT

## 2022-10-26 PROCEDURE — 99223 1ST HOSP IP/OBS HIGH 75: CPT | Performed by: PSYCHIATRY & NEUROLOGY

## 2022-10-26 PROCEDURE — 6370000000 HC RX 637 (ALT 250 FOR IP): Performed by: INTERNAL MEDICINE

## 2022-10-26 PROCEDURE — 93005 ELECTROCARDIOGRAM TRACING: CPT | Performed by: EMERGENCY MEDICINE

## 2022-10-26 PROCEDURE — 70551 MRI BRAIN STEM W/O DYE: CPT

## 2022-10-26 PROCEDURE — 97116 GAIT TRAINING THERAPY: CPT

## 2022-10-26 PROCEDURE — 4A03X5D MEASUREMENT OF ARTERIAL FLOW, INTRACRANIAL, EXTERNAL APPROACH: ICD-10-PCS | Performed by: RADIOLOGY

## 2022-10-26 PROCEDURE — 83036 HEMOGLOBIN GLYCOSYLATED A1C: CPT

## 2022-10-26 PROCEDURE — 97535 SELF CARE MNGMENT TRAINING: CPT

## 2022-10-26 PROCEDURE — 85610 PROTHROMBIN TIME: CPT

## 2022-10-26 PROCEDURE — 97162 PT EVAL MOD COMPLEX 30 MIN: CPT

## 2022-10-26 PROCEDURE — 70450 CT HEAD/BRAIN W/O DYE: CPT

## 2022-10-26 PROCEDURE — 70498 CT ANGIOGRAPHY NECK: CPT

## 2022-10-26 PROCEDURE — 6360000004 HC RX CONTRAST MEDICATION: Performed by: EMERGENCY MEDICINE

## 2022-10-26 PROCEDURE — 6370000000 HC RX 637 (ALT 250 FOR IP): Performed by: EMERGENCY MEDICINE

## 2022-10-26 PROCEDURE — 6360000002 HC RX W HCPCS: Performed by: INTERNAL MEDICINE

## 2022-10-26 PROCEDURE — 1200000000 HC SEMI PRIVATE

## 2022-10-26 PROCEDURE — 85025 COMPLETE CBC W/AUTO DIFF WBC: CPT

## 2022-10-26 PROCEDURE — 97530 THERAPEUTIC ACTIVITIES: CPT

## 2022-10-26 PROCEDURE — 92523 SPEECH SOUND LANG COMPREHEN: CPT

## 2022-10-26 PROCEDURE — 99285 EMERGENCY DEPT VISIT HI MDM: CPT

## 2022-10-26 RX ORDER — GLUCOSAMINE SULFATE 500 MG
1200 CAPSULE ORAL
COMMUNITY

## 2022-10-26 RX ORDER — ASPIRIN 81 MG/1
81 TABLET ORAL DAILY
Status: DISCONTINUED | OUTPATIENT
Start: 2022-10-27 | End: 2022-10-27 | Stop reason: HOSPADM

## 2022-10-26 RX ORDER — LEVOTHYROXINE SODIUM 0.05 MG/1
50 TABLET ORAL DAILY
Status: DISCONTINUED | OUTPATIENT
Start: 2022-10-26 | End: 2022-10-27 | Stop reason: HOSPADM

## 2022-10-26 RX ORDER — AMLODIPINE BESYLATE 5 MG/1
10 TABLET ORAL DAILY
Status: DISCONTINUED | OUTPATIENT
Start: 2022-10-26 | End: 2022-10-27 | Stop reason: HOSPADM

## 2022-10-26 RX ORDER — INSULIN GLARGINE 100 [IU]/ML
10 INJECTION, SOLUTION SUBCUTANEOUS 2 TIMES DAILY
Status: DISCONTINUED | OUTPATIENT
Start: 2022-10-26 | End: 2022-10-27 | Stop reason: HOSPADM

## 2022-10-26 RX ORDER — LOSARTAN POTASSIUM AND HYDROCHLOROTHIAZIDE 25; 100 MG/1; MG/1
1 TABLET ORAL DAILY
Status: DISCONTINUED | OUTPATIENT
Start: 2022-10-27 | End: 2022-10-26 | Stop reason: RX

## 2022-10-26 RX ORDER — HYDROCHLOROTHIAZIDE 25 MG/1
25 TABLET ORAL DAILY
Status: DISCONTINUED | OUTPATIENT
Start: 2022-10-27 | End: 2022-10-27 | Stop reason: HOSPADM

## 2022-10-26 RX ORDER — LOSARTAN POTASSIUM AND HYDROCHLOROTHIAZIDE 25; 100 MG/1; MG/1
1 TABLET ORAL DAILY
COMMUNITY
Start: 2022-09-11

## 2022-10-26 RX ORDER — ATORVASTATIN CALCIUM 80 MG/1
80 TABLET, FILM COATED ORAL NIGHTLY
Status: DISCONTINUED | OUTPATIENT
Start: 2022-10-26 | End: 2022-10-27 | Stop reason: HOSPADM

## 2022-10-26 RX ORDER — ASPIRIN 81 MG/1
324 TABLET, CHEWABLE ORAL ONCE
Status: COMPLETED | OUTPATIENT
Start: 2022-10-26 | End: 2022-10-26

## 2022-10-26 RX ORDER — ROSUVASTATIN CALCIUM 5 MG/1
5 TABLET, COATED ORAL DAILY
COMMUNITY
Start: 2022-08-24 | End: 2022-10-26

## 2022-10-26 RX ORDER — POLYETHYLENE GLYCOL 3350 17 G/17G
17 POWDER, FOR SOLUTION ORAL DAILY PRN
Status: DISCONTINUED | OUTPATIENT
Start: 2022-10-26 | End: 2022-10-27 | Stop reason: HOSPADM

## 2022-10-26 RX ORDER — FAMOTIDINE 20 MG/1
20 TABLET, FILM COATED ORAL 2 TIMES DAILY
Status: DISCONTINUED | OUTPATIENT
Start: 2022-10-26 | End: 2022-10-27 | Stop reason: HOSPADM

## 2022-10-26 RX ORDER — ENOXAPARIN SODIUM 100 MG/ML
40 INJECTION SUBCUTANEOUS DAILY
Status: DISCONTINUED | OUTPATIENT
Start: 2022-10-26 | End: 2022-10-26 | Stop reason: DRUGHIGH

## 2022-10-26 RX ORDER — LEVOTHYROXINE SODIUM 50 MCG
125 TABLET ORAL DAILY
COMMUNITY
Start: 2022-09-11

## 2022-10-26 RX ORDER — ENOXAPARIN SODIUM 100 MG/ML
30 INJECTION SUBCUTANEOUS 2 TIMES DAILY
Status: DISCONTINUED | OUTPATIENT
Start: 2022-10-26 | End: 2022-10-26

## 2022-10-26 RX ORDER — AMLODIPINE BESYLATE 10 MG/1
10 TABLET ORAL DAILY
COMMUNITY
Start: 2022-09-11

## 2022-10-26 RX ORDER — LORAZEPAM 1 MG/1
1 TABLET ORAL
Status: COMPLETED | OUTPATIENT
Start: 2022-10-26 | End: 2022-10-26

## 2022-10-26 RX ORDER — ONDANSETRON 2 MG/ML
4 INJECTION INTRAMUSCULAR; INTRAVENOUS EVERY 6 HOURS PRN
Status: DISCONTINUED | OUTPATIENT
Start: 2022-10-26 | End: 2022-10-27 | Stop reason: HOSPADM

## 2022-10-26 RX ORDER — ASPIRIN 300 MG/1
300 SUPPOSITORY RECTAL DAILY
Status: DISCONTINUED | OUTPATIENT
Start: 2022-10-27 | End: 2022-10-27 | Stop reason: HOSPADM

## 2022-10-26 RX ORDER — DEXTROSE MONOHYDRATE 100 MG/ML
INJECTION, SOLUTION INTRAVENOUS CONTINUOUS PRN
Status: DISCONTINUED | OUTPATIENT
Start: 2022-10-26 | End: 2022-10-27 | Stop reason: HOSPADM

## 2022-10-26 RX ORDER — RIVAROXABAN 20 MG/1
20 TABLET, FILM COATED ORAL DAILY
COMMUNITY
Start: 2022-08-20

## 2022-10-26 RX ORDER — INSULIN LISPRO 100 [IU]/ML
0-4 INJECTION, SOLUTION INTRAVENOUS; SUBCUTANEOUS NIGHTLY
Status: DISCONTINUED | OUTPATIENT
Start: 2022-10-26 | End: 2022-10-27 | Stop reason: HOSPADM

## 2022-10-26 RX ORDER — FAMOTIDINE 20 MG/1
20 TABLET, FILM COATED ORAL 2 TIMES DAILY
Status: ON HOLD | COMMUNITY
Start: 2022-09-30 | End: 2022-10-27 | Stop reason: HOSPADM

## 2022-10-26 RX ORDER — INSULIN LISPRO 100 [IU]/ML
0-8 INJECTION, SOLUTION INTRAVENOUS; SUBCUTANEOUS
Status: DISCONTINUED | OUTPATIENT
Start: 2022-10-26 | End: 2022-10-27 | Stop reason: HOSPADM

## 2022-10-26 RX ORDER — ONDANSETRON 4 MG/1
4 TABLET, ORALLY DISINTEGRATING ORAL EVERY 8 HOURS PRN
Status: DISCONTINUED | OUTPATIENT
Start: 2022-10-26 | End: 2022-10-27 | Stop reason: HOSPADM

## 2022-10-26 RX ORDER — PANTOPRAZOLE SODIUM 40 MG/1
40 GRANULE, DELAYED RELEASE ORAL
COMMUNITY
End: 2022-10-26 | Stop reason: CLARIF

## 2022-10-26 RX ORDER — INSULIN GLARGINE 100 [IU]/ML
10 INJECTION, SOLUTION SUBCUTANEOUS 2 TIMES DAILY
COMMUNITY

## 2022-10-26 RX ORDER — TERAZOSIN 10 MG/1
5 CAPSULE ORAL DAILY
COMMUNITY
Start: 2022-09-11

## 2022-10-26 RX ORDER — LOSARTAN POTASSIUM 100 MG/1
100 TABLET ORAL DAILY
Status: DISCONTINUED | OUTPATIENT
Start: 2022-10-27 | End: 2022-10-27 | Stop reason: HOSPADM

## 2022-10-26 RX ADMIN — ENOXAPARIN SODIUM 30 MG: 100 INJECTION SUBCUTANEOUS at 12:42

## 2022-10-26 RX ADMIN — LORAZEPAM 1 MG: 1 TABLET ORAL at 16:00

## 2022-10-26 RX ADMIN — ATORVASTATIN CALCIUM 80 MG: 80 TABLET, FILM COATED ORAL at 22:17

## 2022-10-26 RX ADMIN — ASPIRIN 81 MG 324 MG: 81 TABLET ORAL at 07:48

## 2022-10-26 RX ADMIN — AMLODIPINE BESYLATE 10 MG: 5 TABLET ORAL at 12:38

## 2022-10-26 RX ADMIN — INSULIN GLARGINE 10 UNITS: 100 INJECTION, SOLUTION SUBCUTANEOUS at 22:17

## 2022-10-26 RX ADMIN — LEVOTHYROXINE SODIUM 50 MCG: 0.05 TABLET ORAL at 12:39

## 2022-10-26 RX ADMIN — INSULIN GLARGINE 10 UNITS: 100 INJECTION, SOLUTION SUBCUTANEOUS at 12:40

## 2022-10-26 RX ADMIN — ENOXAPARIN SODIUM 30 MG: 100 INJECTION SUBCUTANEOUS at 22:28

## 2022-10-26 RX ADMIN — IOPAMIDOL 75 ML: 755 INJECTION, SOLUTION INTRAVENOUS at 06:19

## 2022-10-26 ASSESSMENT — ENCOUNTER SYMPTOMS
SHORTNESS OF BREATH: 0
COUGH: 0
NAUSEA: 0
VOMITING: 0

## 2022-10-26 ASSESSMENT — PAIN - FUNCTIONAL ASSESSMENT: PAIN_FUNCTIONAL_ASSESSMENT: NONE - DENIES PAIN

## 2022-10-26 NOTE — PLAN OF CARE
Speech/lang/cog evaluation completed this date.     Heydi Triana M.S. [id number] [facility]  Speech-language pathologist  [phone]

## 2022-10-26 NOTE — H&P
Hospital Medicine History & Physical      PCP: Lauren Marsh MD    Date of Admission: 10/26/2022    Date of Service: Pt seen/examined on 10/26/2022 and Admitted to Inpatient with expected LOS greater than two midnights due to medical therapy. Chief Complaint:  Dysarthria     History Of Present Illness:   67 y.o. male who presented to [facility] with dysarthria. PMHx significant for Paroxysmal Atrial Fibrillation, DM2, HTN, HLD. He recently underwent Afib Cryoablation at [place]. He more recently was diagnosed with squamous cell skin cancer by biopsy. He was planning to have surgery on the skin lesion and held his Xarelto 1 day prior to the planned procedure. The morning after holding Xarelto he woke up with difficulty speaking. He knew he was speaking gibberish but only lives with pets so no one could validate it. He called EMS and presented to ED for evaluation. CTA was negative for LVO but did show evere focal stenosis involving a proximal M2 branch of the left middle cerebral artery. He was not a TNK candidate. Past Medical History:        Diagnosis Date    Atrial fibrillation ([address]     Cancer ([facility]     skin    Diabetes mellitus ([facility]     Hyperlipidemia     Hypertension     TIA (transient ischemic attack)        Past Surgical History:        Procedure Laterality Date    COLONOSCOPY      COLONOSCOPY  11/14/11    Diverticulosis    TONSILLECTOMY         Medications Prior to Admission:   Prior to Admission medications    Medication Sig Start Date End Date Taking?  Authorizing Provider   amoxicillin-clavulanate (AUGMENTIN) 875-125 MG per tablet Take 1 tablet by mouth 2 times daily 11/27/19   Ralph Alexander DPM   doxycycline hyclate (VIBRA-TABS) 100 MG tablet TAKE 1 TABLET TWICE DAILY FOR FLARES OF SORES ON SCALP 8/26/13   Historical Provider, MD   Dabigatran Etexilate Mesylate 110 MG CAPS Take by mouth    Historical Provider, MD   celecoxib (CELEBREX) 200 MG capsule Take 200 mg by mouth 1/11/18 Historical Provider, MD   Ascorbic Acid (VITAMIN C) 500 MG CAPS Take 1 tablet by mouth 6/30/16   Historical Provider, MD   mupirocin (BACTROBAN) 2 % ointment Apply to infected skin two times daily for 10 days 8/7/18   Historical Provider, MD   glimepiride (AMARYL) 2 MG tablet Take 4 mg by mouth every morning (before breakfast). Historical Provider, MD   warfarin (COUMADIN) 2.5 MG tablet Take 2.5 mg by mouth Daily. Historical Provider, MD   pantoprazole (PROTONIX) 40 MG tablet Take 40 mg by mouth daily. Historical Provider, MD   flecainide (TAMBOCOR) 100 MG tablet Take 150 mg by mouth 2 times daily. Historical Provider, MD   metoprolol (LOPRESSOR) 50 MG tablet Take 50 mg by mouth 2 times daily. Historical Provider, MD   metformin (GLUCOPHAGE) 500 MG tablet Take 1,000 mg by mouth 2 times daily (with meals). Historical Provider, MD   fenofibrate (TRICOR) 145 MG tablet Take 90 mg by mouth daily. Historical Provider, MD   atorvastatin (LIPITOR) 10 MG tablet Take 10 mg by mouth daily. Historical Provider, MD   olmesartan (BENICAR) 20 MG tablet Take 40 mg by mouth daily. Historical Provider, MD   Enoxaparin Sodium (LOVENOX SC) Inject  into the skin. Historical Provider, MD       Allergies:   Cipro xr and Ciprofloxacin    Social History:    TOBACCO:   reports that he has never smoked. He has never used smokeless tobacco.  ETOH:   reports current alcohol use. E-cigarette/Vaping       Questions Responses    E-cigarette/Vaping Use     Start Date     Passive Exposure     Quit Date     Counseling Given     Comments               Family History:    Reviewed and does not pertain to current illness/condition. REVIEW OF SYSTEMS:   Pertinent positives as noted in the HPI. All other systems reviewed with patient as able and negative.     Physical Exam Performed:  BP (!) 145/68   Pulse 69   Temp 98.2 °F (36.8 °C) (Oral)   Resp 14   SpO2 96%   General appearance:  No apparent distress, appears stated age and cooperative. HEENT:  Pupils equal, round, and reactive to light. Conjunctivae/corneas clear. Neck:  Supple, no jugular venous distention. Trachea midline with full range of motion. Respiratory:  Normal respiratory effort. Clear to auscultation, bilaterally without Rales/Wheezes/Rhonchi. Cardiovascular:  Regular rate and rhythm with normal S1/S2 without murmurs, rubs or gallops. Abdomen:  Soft, non-tender, non-distended with normal bowel sounds. Musculoskelatal:  No clubbing, cyanosis or edema bilaterally. Full range of motion without deformity. Neurologic:  Mild dysarthria. Otherwise neurovascularly intact without any focal sensory/motor deficits. Cranial nerves: II-XII intact, grossly non-focal.  Psychiatric:  Alert and oriented, thought content appropriate, normal insight  Skin:  Skin color, texture, turgor normal.  No rashes or lesions. Capillary Refill:  Brisk,< 3 seconds   Peripheral Pulses:  +2 palpable, equal bilaterally     Labs:     Recent Labs     10/26/22  0600   WBC 6.9   HGB 14.2   HCT 42.3        Recent Labs     10/26/22  0600      K 4.2      CO2 27   BUN 17   CREATININE 1.0   CALCIUM 9.7     Recent Labs     10/26/22  0600   AST 35   ALT 28   BILITOT 0.4   ALKPHOS 75     Recent Labs     10/26/22  0600   INR 1.10     No results for input(s): Luna Earnest in the last 72 hours. Radiology:     EKG:  I have reviewed the EKG with the following interpretation: SR, no acute ischemic changes. CT Head W/O Contrast    Result Date: 10/26/2022  EXAMINATION: CT OF THE HEAD WITHOUT CONTRAST  10/26/2022 6:10 am TECHNIQUE: CT of the head was performed without the administration of intravenous contrast. Automated exposure control, iterative reconstruction, and/or weight based adjustment of the mA/kV was utilized to reduce the radiation dose to as low as reasonably achievable. COMPARISON: None.  HISTORY: ORDERING SYSTEM PROVIDED HISTORY: dizziness, slurred speech TECHNOLOGIST PROVIDED HISTORY: Reason for exam:->dizziness, slurred speech Has a \"code stroke\" or \"stroke alert\" been called? ->Yes Decision Support Exception - unselect if not a suspected or confirmed emergency medical condition->Emergency Medical Condition (MA) Reason for Exam: dizziness, slurred speech FINDINGS: BRAIN/VENTRICLES: Generalized atrophy. No mass effect or midline shift. No hydrocephalus. Mild periventricular white matter hypodensity is seen bilaterally. No gross acute hemorrhage. ORBITS: The visualized portion of the orbits demonstrate no acute abnormality. SINUSES: Mucosal thickening of maxillary sinuses and ethmoid air cells. SOFT TISSUES/SKULL:  No acute abnormality of the visualized skull or soft tissues. Atrophy and mild small vessel ischemic disease. Findings were discussed with Dr. Cleveland Foster by the radiology call center at approximately 6:34 a.m. on 10/26/2022. CTA HEAD NECK W CONTRAST    Result Date: 10/26/2022  EXAMINATION: CTA OF THE HEAD AND NECK WITH CONTRAST 10/26/2022 6:10 am: TECHNIQUE: CTA of the head and neck was performed with the administration of intravenous contrast. Multiplanar reformatted images are provided for review. MIP images are provided for review. Stenosis of the internal carotid arteries measured using NASCET criteria. Automated exposure control, iterative reconstruction, and/or weight based adjustment of the mA/kV was utilized to reduce the radiation dose to as low as reasonably achievable. 3D reconstructed images were performed on a separate workstation and provided for review. This scan was analyzed using [employer]. ai contact LVO. Identification of suspected findings is not for diagnostic use beyond notification. Viz LVO is limited to analysis of imaging data and should not be used in-lieu of full patient evaluation or relied upon to make or confirm diagnosis. No large vessel occlusion is identified within the head or neck.  This is discrepant from the suspected large vessel occlusion reported by Drew Hoskins. 1 COMPARISON: None. HISTORY: ORDERING SYSTEM PROVIDED HISTORY: dizziness, slurred speech TECHNOLOGIST PROVIDED HISTORY: Reason for exam:->dizziness, slurred speech Has a \"code stroke\" or \"stroke alert\" been called? ->Yes Decision Support Exception - unselect if not a suspected or confirmed emergency medical condition->Emergency Medical Condition (MA) Reason for Exam: dizziness, slurred speech Initial evaluation. FINDINGS: CTA NECK: AORTIC ARCH/ARCH VESSELS: Incidentally noted is a common origin of the innominate and right common carotid arteries, which is a normal variant. No dissection or arterial injury. No significant stenosis of the brachiocephalic or subclavian arteries. CAROTID ARTERIES: No dissection, arterial injury, or hemodynamically significant stenosis by NASCET criteria. VERTEBRAL ARTERIES: No dissection, arterial injury, or significant stenosis. SOFT TISSUES: No focal consolidation within the visualized lung apices. No acute abnormality within the visualized superior mediastinum. BONES: No convincing acute osseous abnormality is seen. CTA HEAD: ANTERIOR CIRCULATION: There is atherosclerosis of the internal carotid arteries bilaterally without evidence of a focal stenosis. There is severe focal stenosis involving a proximal left M2 branch (axial MPR series 2, image 242; coronal MIP series 613 images 54 and 55). Otherwise, no significant stenosis seen of the anterior cerebral or middle cerebral arteries. No aneurysm identified. POSTERIOR CIRCULATION: No significant stenosis of the vertebral, basilar, or posterior cerebral arteries. No aneurysm. OTHER: No dural venous sinus thrombosis on this non-dedicated study. BRAIN: No mass effect or midline shift. 1. There appears to be severe focal stenosis involving a proximal M2 branch of the left middle cerebral artery. 2. Otherwise, no significant stenosis seen of the [id number].  3. No flow limiting stenosis of the cervical carotid/vertebral arteries. Findings were discussed with Dr. Agustina Segundo on 10/26/2022 at 7:20 am.       Consults:    IP CONSULT TO HOSPITALIST  IP CONSULT TO NEUROLOGY      ASSESSMENT:    Principal Problem:    CVA (cerebral vascular accident) ([facility]  Active Problems:    Paroxysmal atrial fibrillation ([facility]    HTN (hypertension)    DM2 (diabetes mellitus, type 2) ([facility]    Skin cancer  Resolved Problems:    * No resolved hospital problems. *      PLAN:    Acute Ischemic CVA (cerebral vascular accident): CTA was negative for LVO but did show evere focal stenosis involving a proximal M2 branch of the left middle cerebral artery. He was not a TNK candidate. Neurology consulted. Brain MRI showed small CVA. In the context of PAF s/p recent Cryoablation and holding of Xarelto for dermatologic surgery. OK to resume Xarelto as CVA is small. Continue ASA, Statin. BP monitoring. Tele. Recently had ALVINA with ablation and low yield to repeating at this time. Paroxysmal atrial fibrillation: s/p Cryoablation. Stable. Resume Xarelto. Hypertension, benign: Controlled. Continue current medication regimen, monitor and adjust as needed. Diabetes Mellitus, Type 2: Controlled. Continue basal-bolus Insulin regimen. Monitor blood sugar and adjust regimen as needed. Diabetic (Carb-controlled) diet when able. Skin cancer: May need to reschedule surgery. Would try to minimize holding of AC as able. DVT Prophylaxis: Resume Xarelto. Diet: ADULT DIET;  Regular  Code Status: Full Code  PT/OT Eval Status: Pending    Dispo - 1-2 days       Brenda Payton MD

## 2022-10-26 NOTE — ED PROVIDER NOTES
Emergency Department Provider Note  Location: [facility]  ED  10/26/2022     Patient Identification  Ana Laura Gomez is a 67 y.o. male    Chief Complaint  Aphasia (Started 20 minutes prior)      HPI    (History provided by patient)    Patient is a 67 y.o. male with a significant PMHx of atrial fibrillation status post ablation 10/11 at [facility], hyperlipidemia, hypertension, and previous TIAs, presenting the emergency department this morning today with what he describes as \"total gibberish,\" this morning prior to arrival.  On arrival to the emergency department, he is still having some slurred speech, but he has significantly improved. He went to bed at 11 PM last night, 7 hours ago, completely normal.  Additionally, patient does take Xarelto, however had stopped it 48 hours ago in preparation for a skin excision for squamous cell carcinoma. He denies any falls. Denies any chest pain or shortness of breath. Denies any palpitations. Denies any weakness in his extremities. No obvious facial droop. Recent illnesses. Denies any other nausea, vomiting, diarrhea. On my initial evaluation, patient is texting on his phone, and does not have any difficult doing this. I have reviewed the following nursing documentation:  Allergies: Allergies   Allergen Reactions    Cipro Xr     Ciprofloxacin Swelling       Past medical history:  has a past medical history of Atrial fibrillation ([address] Cancer ([address] Diabetes mellitus ([address] Hyperlipidemia, Hypertension, and TIA (transient ischemic attack). Past surgical history:  has a past surgical history that includes Tonsillectomy; Colonoscopy; and Colonoscopy (11/14/11). Home medications:   Prior to Admission medications    Medication Sig Start Date End Date Taking?  Authorizing Provider   amoxicillin-clavulanate (AUGMENTIN) 875-125 MG per tablet Take 1 tablet by mouth 2 times daily 11/27/19   Kirby Ace DPM   doxycycline hyclate (VIBRA-TABS) 100 MG tablet TAKE 1 TABLET TWICE DAILY FOR FLARES OF SORES ON SCALP 8/26/13   Historical Provider, MD   Dabigatran Etexilate Mesylate 110 MG CAPS Take by mouth    Historical Provider, MD   celecoxib (CELEBREX) 200 MG capsule Take 200 mg by mouth 1/11/18   Historical Provider, MD   Ascorbic Acid (VITAMIN C) 500 MG CAPS Take 1 tablet by mouth 6/30/16   Historical Provider, MD   mupirocin (BACTROBAN) 2 % ointment Apply to infected skin two times daily for 10 days 8/7/18   Historical Provider, MD   glimepiride (AMARYL) 2 MG tablet Take 4 mg by mouth every morning (before breakfast). Historical Provider, MD   warfarin (COUMADIN) 2.5 MG tablet Take 2.5 mg by mouth Daily. Historical Provider, MD   pantoprazole (PROTONIX) 40 MG tablet Take 40 mg by mouth daily. Historical Provider, MD   flecainide (TAMBOCOR) 100 MG tablet Take 150 mg by mouth 2 times daily. Historical Provider, MD   metoprolol (LOPRESSOR) 50 MG tablet Take 50 mg by mouth 2 times daily. Historical Provider, MD   metformin (GLUCOPHAGE) 500 MG tablet Take 1,000 mg by mouth 2 times daily (with meals). Historical Provider, MD   fenofibrate (TRICOR) 145 MG tablet Take 90 mg by mouth daily. Historical Provider, MD   atorvastatin (LIPITOR) 10 MG tablet Take 10 mg by mouth daily. Historical Provider, MD   olmesartan (BENICAR) 20 MG tablet Take 40 mg by mouth daily. Historical Provider, MD   Enoxaparin Sodium (LOVENOX SC) Inject  into the skin. Historical Provider, MD       Social history:  reports that he has never smoked. He has never used smokeless tobacco. He reports current alcohol use. Family history:    Family History   Problem Relation Age of Onset    Heart Disease Mother     Cancer Father         colon         ROS  Review of Systems   Constitutional:  Negative for activity change, appetite change and fever. HENT:  Negative for congestion and postnasal drip.     Respiratory:  Negative for cough and shortness of breath. Cardiovascular:  Negative for chest pain and leg swelling. Gastrointestinal:  Negative for nausea and vomiting. Skin:  Negative for rash and wound. Neurological:  Positive for speech difficulty. Negative for dizziness, weakness, light-headedness and numbness. Exam  Vitals:    10/26/22 0550   BP: (!) 182/78   Pulse: 71   Resp: 14   Temp: 98.2 °F (36.8 °C)   TempSrc: Oral   SpO2: 98%         Physical Exam  Constitutional:       Appearance: Normal appearance. He is normal weight. He is not ill-appearing or diaphoretic. HENT:      Head: Normocephalic and atraumatic. Right Ear: External ear normal.      Left Ear: External ear normal.      Nose: Nose normal.      Mouth/Throat:      Mouth: Mucous membranes are moist.      Pharynx: Oropharynx is clear. Eyes:      General:         Right eye: No discharge. Left eye: No discharge. Conjunctiva/sclera: Conjunctivae normal.   Cardiovascular:      Rate and Rhythm: Normal rate and regular rhythm. Pulmonary:      Effort: Pulmonary effort is normal. No respiratory distress. Breath sounds: Normal breath sounds. Abdominal:      General: Abdomen is flat. Palpations: Abdomen is soft. Musculoskeletal:         General: No swelling or tenderness. Cervical back: Normal range of motion and neck supple. Skin:     General: Skin is warm and dry. Findings: No rash. Neurological:      General: No focal deficit present. Mental Status: He is alert and oriented to person, place, and time. Cranial Nerves: No cranial nerve deficit. Sensory: No sensory deficit. Motor: No weakness. Psychiatric:         Mood and Affect: Mood normal.         Thought Content:  Thought content normal.           Bridget Rodney Jr's NIH Stroke Scale at 6:14 AM is:  Level of Consciousness:  0 - alert; keenly responsive    LOC Questions:  0 - answers both questions correctly    LOC Commands:  0 - performs both tasks correctly    Best Gaze:  0 - normal    Visual Fields:  0 - no visual loss    Facial Palsy:  0 - normal symmetric movement    Motor-Arm-Left:  0 - no drift, limb holds 90 (or 45) degrees for full 10 seconds    Motor-Leg-Left:  0 - no drift; leg holds 30 degree position for full 5 seconds    Motor-Arm-Right:  0 - no drift, limb holds 90 (or 45) degrees for full 10 seconds    Motor-Leg-Right:  0 - no drift; leg holds 30 degree position for full 5 seconds    Limb Ataxia:  0 - absent    Sensory:  0 - normal; no sensory loss    Best Language:  0 - no aphasia, normal    Dysarthria:  1 - mild to moderate, patient slurs at least some words and at worst, can be understood with some difficulty    Extinction and Inattention:  0 - no abnormality      ED Course    ED Medication Orders (From admission, onward)      Start Ordered     Status Ordering Provider    10/26/22 8797 10/26/22 0609  iopamidol (ISOVUE-370) 76 % injection 75 mL  IMG ONCE PRN         Ordered JOIE OROSCO            EKG  EKG obtained today in the emergency department shows a normal sinus rhythm with a ventricular to 64 bpm.  QTc 447. No ST segment elevation, ST segment depression, hyperacute T waves. Some motion artifact. Radiology  No results found. Labs  Results for orders placed or performed during the hospital encounter of 10/26/22   POCT Glucose   Result Value Ref Range    POC Glucose 114 (H) 70 - 99 mg/dl    Performed on ACCU-CHEK    EKG 12 Lead   Result Value Ref Range    Ventricular Rate 64 BPM    Atrial Rate 64 BPM    P-R Interval 184 ms    QRS Duration 100 ms    Q-T Interval 434 ms    QTc Calculation (Bazett) 447 ms    P Axis 37 degrees    R Axis 2 degrees    T Axis 48 degrees    Diagnosis       Normal sinus rhythmNormal ECGNo previous ECGs available       Procedures  Procedures      MDM  Patient seen and evaluated. Relevant records reviewed.     - Patient is a 67 y.o. male with Northera, improving on arrival to the emergency department. Patient woke up about an hour and a half prior to arrival with his symptomatology, prompting him to call 911, however last known well was 11 PM.  Last dose of Xarelto was 48 hours ago. History of TIA. In atrial fibrillation today in the ED. Hypertensive to 182/78. On clinical exam, NIHSS is 1 for mild dysarthria, slurred speech. In no acute clinical cardiopulmonary distress. 6:13 AM  Stroke alert initiated for 24-hour symptoms, not a TNK candidate, will talk to  stroke team, for concerns of possible wake-up stroke protocol, and further evaluation and management.    7:21 AM  Call from [facility] regarding CTA;  proximal left M2 severe focal stenosis     Discussed findings with neurosurgery, Dr. Pilar Zamora who states that patient may stay [facility] at [facility] for further neurological and imaging management. Laboratory elevation today largely unremarkable; reassuring CBC, CMP, reassuring point-of-care. Given ASA in the ED. As this patient requires further evaluation and management as above, this patient will be admitted to the hospital for subsequent care. I spoke with hospitalist who accepts patient for admission. They are available to place admission orders. Medications   iopamidol (ISOVUE-370) 76 % injection 75 mL (has no administration in time range)         - I discussed the results, including any incidental findings, with patient. Questions answered. Patient/family agreeable to plan and express understanding of plan. Disposition:  Admit to telemetry in fair condition. Is this patient to be included in the SEP-1 Core Measure due to severe sepsis or septic shock? No   Exclusion criteria - the patient is NOT to be included for SEP-1 Core Measure due to: Infection is not suspected    Consult this encounter: neurosurgery, hospitalist    Clinical Impression:  1. Dysarthria    2. Occlusion and stenosis of unspecified middle cerebral artery    3.  Small vessel disease, cerebrovascular    4. Stroke-like symptom        Blood pressure (!) 182/78, pulse 71, temperature 98.2 °F (36.8 °C), temperature source Oral, resp. rate 14, SpO2 98 %. Nayana Orosco DO, am the primary attending of record and contributed the majority of evaluation and treatment of emergent care for this encounter. Total critical care time is 55 minutes, which excludes separately billable procedures and updating family. Time spent is specifically for management of the presenting complaint and symptoms initially, direct bedside care, reevaluation, review of records, and consultation. There was a high probability of clinically significant life-threatening deterioration in the patient's condition, which required my urgent intervention. This chart was generated in part by using Dragon Dictation system and may contain errors related to that system including errors in grammar, punctuation, and spelling, as well as words and phrases that may be inappropriate. If there are any questions or concerns please feel free to contact the dictating provider for clarification.      JOIE OROSCO DO  [facility]  10/26/22 1474

## 2022-10-26 NOTE — PROGRESS NOTES
4 Eyes Skin Assessment     The patient is being assess for   Admission    I agree that 2 RN's have performed a thorough Head to Toe Skin Assessment on the patient. ALL assessment sites listed below have been assessed. Areas assessed for pressure by both nurses:   [x]   Head, Face, and Ears   [x]   Shoulders, Back, and Chest, Abdomen  [x]   Arms, Elbows, and Hands   [x]   Coccyx, Sacrum, and Ischium  [x]   Legs, Feet, and Heels        Skin Assessed Under all Medical Devices by both nurses:  NA               All Mepilex Borders were peeled back and area peeked at by both nurses:  No: NA  Please list where Mepilex Borders are located:           Scattered skin cancer spots    **SHARE this note so that the co-signing nurse is able to place an eSignature**    Co-signer eSignature: Electronically signed by Vonda Looney RN on 10/26/22 at 6:58 PM EDT    Does the Patient have Skin Breakdown related to pressure?   No            Tylor Prevention initiated:  Yes   Wound Care Orders initiated:  NA      [facility] nurse consulted for Pressure Injury (Stage 3,4, Unstageable, DTI, NWPT, Complex wounds)and New or Established Ostomies:  NA      Primary Nurse eSignature: Electronically signed by Aimee Chavira RN on 10/26/22 at 6:57 PM EDT

## 2022-10-26 NOTE — PROGRESS NOTES
Pharmacist Review and Automatic Dose Adjustment of Prophylactic Enoxaparin    The reviewing pharmacist has made an adjustment to the ordered enoxaparin dose or converted to UFH per the approved [facility] protocol and table as identified below. Ping Franklin is a 67 y.o. male. Recent Labs     10/26/22  0600   CREATININE 1.0       Estimated Creatinine Clearance: 85 mL/min (based on SCr of 1 mg/dL).     Recent Labs     10/26/22  0600   HGB 14.2   HCT 42.3        Recent Labs     10/26/22  0600   INR 1.10       Height:   Ht Readings from Last 1 Encounters:   10/26/22 5' 11\" (1.803 m)     Weight:  Wt Readings from Last 1 Encounters:   10/26/22 254 lb (115.2 kg)               Plan: Based upon the patient's weight and renal function    Ordered: Enoxaparin 40mg Daily    Changed/converted to    New Order: Enoxaparin 30mg SUBQ BID      Thank you,  Casey Moreno [id number], [facility]  10/26/2022, 11:19 AM

## 2022-10-26 NOTE — PROGRESS NOTES
Physical Therapy  Facility/Department: [place] [address] - MED SURG/ORTHO  Physical Therapy Initial Assessment/Treatment/Discharge Summary    Name: Miranda Aiken  : 1950  MRN: [id number]  Date of Service: 10/26/2022    Discharge Recommendations:  Home with assist PRN   PT Equipment Recommendations  Equipment Needed: No      Patient Diagnosis(es): The primary encounter diagnosis was Dysarthria. 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 Past Medical History:  has a past medical history of Atrial fibrillation ([facility] [address] Cancer ([facility] [address] Diabetes mellitus ([facility] [address] Hyperlipidemia, Hypertension, and TIA (transient ischemic attack). Past Surgical History:  has a past surgical history that includes Tonsillectomy; Colonoscopy; and Colonoscopy (11). Assessment   Body Structures, Functions, Activity Limitations Requiring Skilled Therapeutic Intervention: Decreased functional mobility   Assessment: Pt presents to [facility] with slurred speech and suspected TIA. PTA, pt lives alone and performs functional mobility IND with no AD and reports living very active lifestyle. Pt reports symptoms have almost completely resolved at time of eval. Pt able to perform bed mobility, transfers and ambulation IND. No LOB or dizziness noted during session.  No further PT needs at this time; d/c acute PT  Treatment Diagnosis: impaired functional mobility  Therapy Prognosis: Good  Decision Making: Medium Complexity  Requires PT Follow-Up: No  Activity Tolerance  Activity Tolerance: Patient tolerated evaluation without incident     Plan   Physcial Therapy Plan  General Plan: Discharge  Current Treatment Recommendations: Gait training, Safety education & training, Patient/Caregiver education & training, Therapeutic activities  Safety Devices  Type of Devices: Left in chair, Nurse notified, Gait belt, Call light within reach  Restraints  Restraints Initially in Place: No     Restrictions  Position Activity Restriction  Other position/activity restrictions: medium fall risk, IV     Subjective   Pain: Denies pain  General  Chart Reviewed: Yes  Patient assessed for rehabilitation services?: Yes  Response To Previous Treatment: Not applicable  Family / Caregiver Present: No  Referring Practitioner: Charmayne Lavender, MD  Referral Date : 10/26/22  Diagnosis: TIA  Follows Commands: Within Functional Limits  General Comment  Comments: Rn cleared pt for PT eval  Subjective  Subjective: Pt semi-supine in bed, just finished working with SLP.  Pt reports feeling \"99% better\"         Social/Functional History  Social/Functional History  Lives With: Alone (dog)  Type of Home: House  Home Layout: Able to Live on Main level with bedroom/bathroom, Performs ADL's on one level, Two level, Work area in basement  Home Access: Stairs to enter with rails  Entrance Stairs - Number of Steps: 2  Bathroom Shower/Tub: Walk-in shower  Bathroom Toilet: Handicap height  Bathroom Equipment: Built-in shower seat, Hand-held shower  Bathroom Accessibility: Walker accessible  Home Equipment: Cane, Reacher  Has the patient had two or more falls in the past year or any fall with injury in the past year?: No  ADL Assistance: Independent  Homemaking Assistance: Independent  Ambulation Assistance: Independent  Transfer Assistance: Independent  Active : Yes  Mode of Transportation: [facility]  Occupation: Retired  Type of Occupation: Business  Leisure & Hobbies: Volunteers with multiple organizations, walking the dog, reading, movies    Vision/Hearing  Vision  Vision: Impaired  Vision Exceptions: Wears glasses at all times  Hearing  Hearing: Within functional limits      Cognition   Orientation  Overall Orientation Status: Within Normal Limits  Cognition  Overall Cognitive Status: WNL     Objective   Heart Rate: 62  Heart Rate Source: Monitor  BP: 128/86  BP Location: Left upper arm  BP Method: Automatic  Patient Position: Up in chair  MAP (Calculated): 100  Resp: 20  SpO2: 95 %  O2 Device: None (Room air)        Gross Assessment  AROM: Within functional limits (Equal bilaterally)  PROM: Within functional limits  Strength: Within functional limits (Equal bilaterally)  Coordination: Within functional limits (Equal bilaterally)  Tone: Normal  Sensation: Impaired (neuropathy)     Bed Mobility Training  Bed Mobility Training: Yes  Overall Level of Assistance: Independent  Supine to Sit: Independent  Sit to Supine: Independent  Balance  Sitting: Intact  Standing: Intact  Transfer Training  Transfer Training: Yes  Overall Level of Assistance: Independent  Interventions: Verbal cues; Safety awareness training  Sit to Stand: Independent  Stand to Sit: Independent  Toilet Transfer: Independent  Gait Training  Gait Training: Yes  Gait  Overall Level of Assistance: Independent  Interventions: Verbal cues  Base of Support: Narrowed  Gait Abnormalities:  (Mild change in pace with conversation and headturns; no LOB noted)  Distance (ft): 150 Feet  Assistive Device: Gait belt  Rail Use: Left  Stairs - Level of Assistance: Independent  Number of Stairs Trained: 3 (nonreciprocal pattern)      AM-PAC Score  AM-PAC Inpatient Mobility Raw Score : 24 (10/26/22 1507)  AM-PAC Inpatient T-Scale Score : 61.14 (10/26/22 1507)  Mobility Inpatient CMS 0-100% Score: 0 (10/26/22 1507)  Mobility Inpatient CMS G-Code Modifier : [address] (10/26/22 1507)          Goals  Short Term Goals  Time Frame for Short Term Goals: 1 session  Short Term Goal 1: pt will ambulate 150 ft with IND and LRAD--10/26 Goal Met  Patient Goals   Patient Goals : \"to go home\"       Education  Patient Education  Education Given To: Patient  Education Provided: Role of Therapy;Plan of Care;Transfer Training; Fall Prevention Strategies  Education Method: Verbal  Barriers to Learning: None  Education Outcome: Verbalized understanding;Demonstrated understanding      Therapy Time   Individual Concurrent Group Co-treatment   Time In 1333         Time Out 1410         Minutes 37         Timed Code Treatment Minutes: 27 Minutes (10 min eval)       Ramona Williamson, PT

## 2022-10-26 NOTE — PROGRESS NOTES
Occupational Therapy  Facility/Department: [place] [id number] - MED SURG/ORTHO  Occupational Therapy Initial Assessment/Treatment/Discharge Summary  1x only    Name: Soila Weir  : 1950  MRN: [id number]  Date of Service: 10/26/2022    Discharge Recommendations:  Home with assist PRN          Patient Diagnosis(es): The primary encounter diagnosis was Dysarthria. 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 Past Medical History:  has a past medical history of Atrial fibrillation ([facility] [address] Cancer ([facility] [address] Diabetes mellitus ([facility] [address] Hyperlipidemia, Hypertension, and TIA (transient ischemic attack). Past Surgical History:  has a past surgical history that includes Tonsillectomy; Colonoscopy; and Colonoscopy (11). Assessment   Assessment: Patient lives alone, [place]. Patient admitted with slurred/garbled speech, today patient speech not garbled during session, supervision with mobility/transfers and ADLs. No further OT needs, will sign off.   Prognosis: Good  Decision Making: Low Complexity  REQUIRES OT FOLLOW-UP: No        Plan   Occupational Therapy Plan  Times Per Week: 1x only     Restrictions  Position Activity Restriction  Other position/activity restrictions: medium fall risk, IV    Subjective   General  Chart Reviewed: Yes, Orders, Progress Notes, History and Physical  Patient assessed for rehabilitation services?: Yes  Family / Caregiver Present: No  Referring Practitioner: Lera Cowden, MD 10/26/22  Diagnosis: TIA  Subjective  Subjective: Pt pleasant and agreeable to OT evaluation  Denies pain  Social/Functional History  Social/Functional History  Lives With: Alone (dog)  Type of Home: House  Home Layout: Able to Live on Main level with bedroom/bathroom, Performs ADL's on one level, Two level, Work area in basement  Home Access: Stairs to enter with rails  Entrance Stairs - Number of Steps: 2  Bathroom Shower/Tub: Walk-in shower  Bathroom Toilet: Handicap height  Bathroom Equipment: Built-in shower seat, Hand-held shower  Bathroom Accessibility: Walker accessible  Home Equipment: Marlen Shell  Has the patient had two or more falls in the past year or any fall with injury in the past year?: No  ADL Assistance: Independent  Homemaking Assistance: Independent  Ambulation Assistance: Independent  Transfer Assistance: Independent  Active : Yes  Mode of Transportation: [place]  Occupation: Retired  Type of Occupation: Business  Leisure & Hobbies: Volunteers with multiple organizations, walking the dog, reading, movies       Objective   Heart Rate: 62  Heart Rate Source: Monitor  BP: 128/86  BP Location: Left upper arm  BP Method: Automatic  Patient Position: Up in chair  MAP (Calculated): 100  Resp: 20  SpO2: 95 %  O2 Device: None (Room air)             Safety Devices  Type of Devices: Left in chair;Nurse notified;Gait belt;Call light within reach    Restraints  Restraints Initially in Place: No  Bed Mobility Training  Bed Mobility Training: Yes  Supine to Sit: Independent  Sit to Supine: Independent    Balance  Sitting: Intact  Standing: Intact  Transfer Training  Transfer Training: Yes  Overall Level of Assistance: Independent  Sit to Stand: Independent  Stand to Sit: Independent  Toilet Transfer: Independent    Gait  Overall Level of Assistance: Independent         Vision  Vision: Impaired  Vision Exceptions: Wears glasses at all times  Hearing  Hearing: Within functional limits  Cognition  Overall Cognitive Status: WNL  Orientation  Overall Orientation Status: Within Normal Limits      Education Given To: Patient  Education Provided: Role of Therapy;Plan of Care;ADL Adaptive Strategies;Transfer Training  Education Method: Demonstration;Verbal  Barriers to Learning: None  Education Outcome: Verbalized understanding;Demonstrated understanding      Patient educated on risks and warning signs of stroke. Face  Arm  Speech  Time    Patient verbalized understanding of above education.     AM-PAC Score        AM-PAC Inpatient Daily Activity Raw Score: 24 (10/26/22 1440)  AM-PAC Inpatient ADL T-Scale Score : 57.54 (10/26/22 1440)  ADL Inpatient CMS 0-100% Score: 0 (10/26/22 1440)  ADL Inpatient CMS G-Code Modifier : 509 [address] (10/26/22 1440)         Goals  Short Term Goals  Time Frame for Short Term Goals: 1 session  Short Term Goal 1: Pt will complete toilet transfers with supervision-stg met  Patient Goals   Patient goals : \"to go home\"       Therapy Time   Individual Concurrent Group Co-treatment   Time In 1348         Time Out 1413         Minutes 25         Timed Code Treatment Minutes: 25 Minutes       KAVITA Palacios/SHADE

## 2022-10-26 NOTE — CARE COORDINATION
CASE MANAGEMENT INITIAL ASSESSMENT      Reviewed chart and completed assessment with patient:bedside  Family present: none  Explained Case Management role/services. yes    Primary contact information:[facility] Decision Maker :   Primary Decision Maker: Momo Wiley Spouse - [phone]          Can this person be reached and be able to respond quickly, such as within a few minutes or hours? Yes    Admit date/status:10/26/22/ Inp  Diagnosis:TIA   Is this a Readmission?:  No      Insurance:Medicare/[facility]   Precert required for SNF: No       3 night stay required: No    Living arrangements, Adls, care needs, prior to admission:Lives alone. [address] at home:  Walker__Cane__RTS__ BSC__Shower Chair__  02__ HHN__ CPAP__  BiPap__  Hospital Bed__ W/C___ Other_____    Services in the home and/or outpatient, prior to admission:none    Current PCP:Rashaad Huber        Medications: Prescription coverage? Yes     Transportation needs: none         PT/OT recs:none    Hospital Exemption Notification (HEN):needed for SNF    Barriers to discharge:none    Plan/comments:Patient plans to return home alone. Patient is IPTA, and is active with his dog. Patient needs no assistance with ADL's. At times grandson spends time with him. Open to Carrie Spence with no preference. Will continue to follow.       Stone Veronica RN

## 2022-10-26 NOTE — PROGRESS NOTES
Speech Language Pathology  Facility/Department: [facility] - MED SURG/ORTHO  Initial Speech/Language/Cognitive Assessment    NAME: Zonia White  : 1950   MRN: [id number]  ADMISSION DATE: 10/26/2022  ADMITTING DIAGNOSIS: has TIA (transient ischemic attack); Paroxysmal atrial fibrillation (HCC); HTN (hypertension); DM2 (diabetes mellitus, type 2) ([address] and Skin cancer on their problem list.  DATE ONSET: Pt admitted to [facility] on 10/26/22    Date of Eval: 10/26/2022   Evaluating Therapist: CLAUDE Henson    RECENT RESULTS  CT OF HEAD (10/26/22): MRI of the Brain ordered  Impression   Atrophy and mild small vessel ischemic disease. Primary Complaint: pt reports significant improvement; he reported that he was initially \"speaking nonsense\"    Per MD H&P, \"The patient is a 67y.o.  years old male with history of A. fib, TIA and hypertension who was admitted to the hospital yesterday with acute aphasia. Symptoms started early morning. He woke up to go to the bathroom and then felt dizzy and lightheaded followed by word finding difficulties and dysarthria. He was unable to speak clearly or follow direction. Degree was severe and persistent. No fall or injury. No headache, dysphagia, chest pain, numbness or tingling or weakness. No other relieving or aggravating factors. Duration was persistent. He came to the ED last night where he was admitted. Initial imaging was CT head showed no acute stroke and left MCA stenosis. Today he feels better but not back to his baseline. He stopped taking Xarelto the day before admission due to preparation for skin biopsy for possible melanoma. History of A. fib status post ablation a month ago. Other review of system was unremarkable\".     Pain:  Pain Assessment  Pain Assessment: None - Denies Pain    Vision/ Hearing  Vision  Vision: Impaired  Vision Exceptions: Wears glasses at all times  Hearing  Hearing: Within functional limits    Assessment:  Pt reports significant improvement in reports to speech/lang/cog, initially \"speaking nonsense\". He demonstrated mild expressive language deficit with x3 instances of word finding difficulty across entirety of session (~30 minutes) and was able to self-correct errors in 3/3. Mild cognitive deficit also suspected, would benefit from ongoing assessment (see below). -Pt oriented x4.  -He was able to effectively relay situation/course of hospitalization, discussion with neurologist earlier this date.    -Basic and complex y/n: 100%, no cues  -Confrontational namin%  -Divergent naming (concrete & abstract categories): 100%  -Verbal reasoning questions- pt able to effectively provide solutions for scenarios without difficulty or instances of word finding  -Short-term recall of 3 words (~5 minute delay): pt successfully recalled 2/3, required mod cues for 3/3. Aspects of [place] Cognitive Assessment (MoCA):  -Trail-making task: 100%  -Copy cube: single line error  -Clock drawing task, ID target time: 100%    Pt demonstrated good understanding of reviewed word retrieval strategies, able to utilize as needed during conversation with SLP. SLP encouraged pt to f/u with outpatient ST in the future for high-level speech/lang/cog tx as indicated. He verbalized understanding. Swallow screen also completed this date. No hx of dysphagia per chart review, pt denied dysphagia. Pt currently on a regular solid diet with thin liquids per MD.  RN reported pt had x1 significantly delayed cough with PO earlier this date. Pt indirectly observed with consecutive thin liquid trials via cup with grossly timely swallow initiation, no clinical s/s of aspiration noted. Continue current diet at this time with formal BSE to be completed in the future as clinically indicated.     Recommendations:  Recommendations  Requires SLP Intervention: Yes  Patient Education:Pt educated on reason for referral, role of , assessment results and recommendations. Patient Education Response: Verbalizes understanding  Duration of Treatment: 1-2 total f/us; 3 days          Plan:   Speech Therapy Prognosis  Prognosis: Excellent  Prognosis Considerations: Age; Potential;Previous Level of Function;Severity of Impairments; Family/Community Support;Medical Diagnosis;Participation Level; Motivation  Individuals consulted  Consulted and agree with results and recommendations: Patient;RN;PT  RN Name: Chago Mcmillan  Safety Devices in place: Yes  Type of devices: Bed alarm in place;Call light within reach;Nurse notified; Left in bed  Restraints Initially in Place: No    Goals:  Long Term Goals  Time Frame for Long Term Goals: 3 days, 10/29/22  Goal 1: The pt will effectively participate in conversation with unfamiliar listener without instances of word finding difficulty. Short Term Goals  Time Frame for Short Term Goals: 2 days, 10/28/22  Goal 1: The pt will complete high-level graded naming tasks with 95% accuracy, no cues. Goal 2: The pt will complete graded recall tasks with 95% accuracy, no cues. Goal 3: The pt will complete graded thought organization and critical thinking tasks with 95% accuracy, no cues.    Patient/family involved in developing goals and treatment plan: Yes    Subjective:  Social/Functional History  Lives With: Other (comment) (dog)  Vision  Vision: Impaired  Vision Exceptions: Wears glasses at all times  Hearing  Hearing: Within functional limits    Objective:    Oral Motor   Labial: No impairment  Dentition: Intact  Lingual: No impairment    Motor Speech  Apraxic Characteristics: None  Dysarthric Characteristics: None  Intelligibility: No impairment  Overall Impairment Severity: None    Auditory Comprehension  Comprehension: Exceptions  Basic Questions: WFL  One Step Commands: [facility]    Reading Comprehension  Reading Status:  (Did not formally assess this date)    Expression  Primary Mode of Expression: Verbal    Verbal Expression  Verbal Expression: Exceptions to functional limits  Repetition: Mild  Conversation: Mild (x3 errors made across entirety of session, ~30 minutes)  Effective Techniques: Word retrived strategies;Provide extra time    Written Expression  Dominant Hand: Right  Written Expression: Within Functional Limits    Pragmatics/Social Functioning  Pragmatics: Within functional limits    Cognition:      Orientation  Overall Orientation Status: Within Functional Limits  Attention  Attention: Exceptions to [facility]  Selective Attention: [facility]  Sustained Attention: [facility]  Memory  Memory: Exceptions to [facility]  Short-term Memory: Mild  Working Memory: Mild  Problem Solving  Problem Solving: Exceptions to [facility]  Verbal Reasoning Skills: [facility]  Numeric Reasoning  Numeric Reasoning: Exceptions to [facility]   Time: WFL  Abstract Reasoning  Abstract Reasoning: Within Functional Limits  Safety/Judgment  Safety/Judgment: Within Functional Limits  Unable to Self-monitor and Self-correct Consistently: WFL    Prognosis:  Speech Therapy Prognosis  Prognosis: Excellent  Prognosis Considerations: Age; Potential;Previous Level of Function;Severity of Impairments; Family/Community Support;Medical Diagnosis;Participation Level; Motivation  Individuals consulted  Consulted and agree with results and recommendations: Patient;RN;PT  RN Name: Oumou Jain    Education:  Patient Education:Pt educated on reason for referral, role of ST, assessment results and recommendations. Patient Education Response: Verbalizes understanding  Safety Devices in place: Yes  Type of devices: Bed alarm in place;Call light within reach;Nurse notified; Left in bed    Therapy Time:   Individual Concurrent Group Co-treatment   Time In  1303         Time Out  1333         Minutes  30           Eval speech sound ellie Vaughn M.S. [facility]  Speech-language pathologist  [phone]

## 2022-10-26 NOTE — CONSULTS
In patient Neurology consult        [facility] Neurology      Roland Stout MD      Jennifer Rollins  1950    Date of Service: 10/26/2022    Referring Physician: Kai Saeed MD      Reason for the consult and CC: Acute aphasia and new stroke    HPI:   The patient is a 67y.o.  years old male with history of A. fib, TIA and hypertension who was admitted to the hospital yesterday with acute aphasia. Symptoms started early morning. He woke up to go to the bathroom and then felt dizzy and lightheaded followed by word finding difficulties and dysarthria. He was unable to speak clearly or follow direction. Degree was severe and persistent. No fall or injury. No headache, dysphagia, chest pain, numbness or tingling or weakness. No other relieving or aggravating factors. Duration was persistent. He came to the ED last night where he was admitted. Initial imaging was CT head showed no acute stroke and left MCA stenosis. Today he feels better but not back to his baseline. He stopped taking Xarelto the day before admission due to preparation for skin biopsy for possible melanoma. History of A. fib status post ablation a month ago. Other review of system was unremarkable.       Family History   Problem Relation Age of Onset    Heart Disease Mother     Cancer Father         colon     Past Surgical History:   Procedure Laterality Date    COLONOSCOPY      COLONOSCOPY  11/14/11    Diverticulosis    TONSILLECTOMY          Past Medical History:   Diagnosis Date    Atrial fibrillation ([address]     Cancer ([facility]     skin    Diabetes mellitus ([facility]     Hyperlipidemia     Hypertension     TIA (transient ischemic attack)      Social History     Tobacco Use    Smoking status: Never    Smokeless tobacco: Never   Substance Use Topics    Alcohol use: Yes     Comment: 1/ month     Allergies   Allergen Reactions    Cipro Xr     Ciprofloxacin Swelling     Current Facility-Administered Medications   Medication Dose Route Frequency Provider Last Rate Last Admin    ondansetron (ZOFRAN-ODT) disintegrating tablet 4 mg  4 mg Oral Q8H PRN Brent Zuñiga MD        Or    ondansetron [facility]) injection 4 mg  4 mg IntraVENous Q6H PRN Brent Zuñiga MD        polyethylene glycol [facility]) packet 17 g  17 g Oral Daily PRN Brent Zuñiga MD        [START ON 10/27/2022] aspirin EC tablet 81 mg  81 mg Oral Daily Brent Zuñiga MD        Or    Luther Rowley ON 10/27/2022] aspirin suppository 300 mg  300 mg Rectal Daily Brent Zuñiga MD        glucose chewable tablet 16 g  4 tablet Oral PRN Brent Zuñiga MD        dextrose bolus 10% 125 mL  125 mL IntraVENous PRN Brent Zuñiga MD        Or    dextrose bolus 10% 250 mL  250 mL IntraVENous PRN Brent Zuñiga MD        glucagon (rDNA) injection 1 mg  1 mg SubCUTAneous PRN Brent Zuñiga MD        dextrose 10 % infusion   IntraVENous Continuous PRN Brent Zuñiga MD        atorvastatin (LIPITOR) tablet 80 mg  80 mg Oral Nightly Brent Zuñiga MD        insulin glargine (LANTUS) injection vial 10 Units  10 Units SubCUTAneous BID Brent Zuñiga MD        insulin lispro (HUMALOG) injection vial 0-8 Units  0-8 Units SubCUTAneous TID WC Brent Zuñiga MD        insulin lispro (HUMALOG) injection vial 0-4 Units  0-4 Units SubCUTAneous Nightly Brent Zuñiga MD        amLODIPine (NORVASC) tablet 10 mg  10 mg Oral Daily Brent Zuñiga MD        famotidine (PEPCID) tablet 20 mg  20 mg Oral BID Brent Zuñiga MD        levothyroxine (SYNTHROID) tablet 50 mcg  50 mcg Oral Daily MD Luther Mcnair ON 10/27/2022] losartan (COZAAR) tablet 100 mg  100 mg Oral Daily Brent Zuñiga MD        And    Luther Rowley ON 10/27/2022] hydroCHLOROthiazide (HYDRODIURIL) tablet 25 mg  25 mg Oral Daily Brent Zuñiga MD        enoxaparin Sodium (LOVENOX) injection 30 mg  30 mg SubCUTAneous BID Brent Zuñiga MD           ROS : A 10-14 system review of constitutional, cardiovascular, respiratory, eyes, musculoskeletal, endocrine, GI, ENT, skin, hematological, genitourinary, psychiatric and neurologic systems was obtained and updated today and is unremarkable except as mentioned in my HPI      Exam:     Constitutional:   Vitals:    10/26/22 0900 10/26/22 0949 10/26/22 1043 10/26/22 1100   BP: (!) 153/70 (!) 148/69 (!) 140/77    Pulse: 61 63 61    Resp: 14 15 20    Temp: 98.1 °F (36.7 °C) 98.3 °F (36.8 °C) 98 °F (36.7 °C)    TempSrc: Oral Oral Oral    SpO2: 97% 98% 96%    Weight:    254 lb (115.2 kg)   Height:    5' 11\" (1.803 m)       General appearance and observation: Normal development and appear in no acute distress. Eye:  Fundus: No blurring of optic disc. Neck: supple  Cardiovascular: No lower leg edema with good pulsation. Mental Status:   Oriented to person, place, problem, and time. Memory: Good immediate recall. Intact remote memory  Normal attention span and concentration. Language: Paraphasic error with mild mixed aphasia. Good fund of Knowledge. Aware of current events and vocabulary   Cranial Nerves:   II: Visual fields: Full. Pupils: equal, round, reactive to light  III,IV,VI: Extra Ocular Movements are intact. No nystagmus  V: Facial sensation is intact  VII: Facial strength and movements: intact and symmetric  VIII: Hearing: Intact  IX: Palate elevation is symmetric  XI: Shoulder shrug is intact  XII: Tongue movements are normal  Musculoskeletal: 5/5 in all 4 extremities. Tone: Normal tone. Reflexes: Symmetric 2+ in the arms and 2+ in the legs   Planters: flexor bilaterally. Coordination: no pronator drift, no dysmetria with FNF in upper extremities. Normal REM. Sensation: normal to all modalities in both arms and legs. Gait/Posture: steady gait and normal posturing and station.      Data:  LABS:   Lab Results   Component Value Date/Time     10/26/2022 06:00 AM    K 4.2 10/26/2022 06:00 AM     10/26/2022 06:00 AM    CO2 27 10/26/2022 06:00 AM    BUN 17 10/26/2022 06:00 AM    CREATININE 1.0 10/26/2022 06:00 AM    GFRAA >60 04/01/2021 10:25 AM    GFRAA >60 03/26/2013 09:31 AM    LABGLOM >60 10/26/2022 06:00 AM    GLUCOSE 123 10/26/2022 06:00 AM    CALCIUM 9.7 10/26/2022 06:00 AM     Lab Results   Component Value Date/Time    WBC 6.9 10/26/2022 06:00 AM    RBC 4.76 10/26/2022 06:00 AM    HGB 14.2 10/26/2022 06:00 AM    HCT 42.3 10/26/2022 06:00 AM    MCV 88.8 10/26/2022 06:00 AM    RDW 14.4 10/26/2022 06:00 AM     10/26/2022 06:00 AM     Lab Results   Component Value Date    INR 1.10 10/26/2022    PROTIME 14.1 10/26/2022       Neuroimaging was independently reviewed by myself and discussed results with the patient and/or family  Reviewed notes from different physicians  Reviewed lab and blood testing    Impression:  Acute aphasia likely secondary new ischemic stroke. Paroxysmal A. fib  Hypertension  Hyperlipidemia  Diabetes, not controlled    Recommendation:  MRI brain  Restart Xarelto after MRI   Blood pressure monitor and continue blood pressure medications  Statin  Insulin sliding scale  A1c  Diabetic control  Speech evaluation  PT and OT  Lengthy discussion with the patient regarding stroke prevention, risk of recurrence and risk of blood thinners. He was advised to follow-up with his dermatologist and not to stop Xarelto before procedure. He can be discharged from neurology once medically stable        Thank you for referring such patient. If you have any questions regarding my consult note, please don't hesitate to call me. Naomi Mitchell MD  [phone]    This dictation was generated by voice recognition computer software.  Although all attempts are made to edit the dictation for accuracy, there may be errors in the  transcription that are not intended

## 2022-10-27 VITALS
BODY MASS INDEX: 35.56 KG/M2 | RESPIRATION RATE: 18 BRPM | HEIGHT: 71 IN | SYSTOLIC BLOOD PRESSURE: 158 MMHG | DIASTOLIC BLOOD PRESSURE: 77 MMHG | HEART RATE: 51 BPM | OXYGEN SATURATION: 94 % | WEIGHT: 254 LBS | TEMPERATURE: 97.8 F

## 2022-10-27 LAB
ANION GAP SERPL CALCULATED.3IONS-SCNC: 8 MMOL/L (ref 3–16)
BUN BLDV-MCNC: 15 MG/DL (ref 7–20)
CALCIUM SERPL-MCNC: 9.2 MG/DL (ref 8.3–10.6)
CHLORIDE BLD-SCNC: 106 MMOL/L (ref 99–110)
CHOLESTEROL, TOTAL: 128 MG/DL (ref 0–199)
CO2: 30 MMOL/L (ref 21–32)
CREAT SERPL-MCNC: 1 MG/DL (ref 0.8–1.3)
ESTIMATED AVERAGE GLUCOSE: 134.1 MG/DL
ESTIMATED AVERAGE GLUCOSE: 134.1 MG/DL
GFR SERPL CREATININE-BSD FRML MDRD: >60 ML/MIN/{1.73_M2}
GLUCOSE BLD-MCNC: 120 MG/DL (ref 70–99)
GLUCOSE BLD-MCNC: 131 MG/DL (ref 70–99)
GLUCOSE BLD-MCNC: 152 MG/DL (ref 70–99)
HBA1C MFR BLD: 6.3 %
HBA1C MFR BLD: 6.3 %
HCT VFR BLD CALC: 39.8 % (ref 40.5–52.5)
HDLC SERPL-MCNC: 30 MG/DL (ref 40–60)
HEMOGLOBIN: 13.3 G/DL (ref 13.5–17.5)
LDL CHOLESTEROL CALCULATED: 55 MG/DL
MCH RBC QN AUTO: 29.8 PG (ref 26–34)
MCHC RBC AUTO-ENTMCNC: 33.5 G/DL (ref 31–36)
MCV RBC AUTO: 89.2 FL (ref 80–100)
PDW BLD-RTO: 14 % (ref 12.4–15.4)
PERFORMED ON: ABNORMAL
PERFORMED ON: ABNORMAL
PLATELET # BLD: 180 K/UL (ref 135–450)
PMV BLD AUTO: 8.2 FL (ref 5–10.5)
POTASSIUM REFLEX MAGNESIUM: 4.1 MMOL/L (ref 3.5–5.1)
RBC # BLD: 4.46 M/UL (ref 4.2–5.9)
SODIUM BLD-SCNC: 144 MMOL/L (ref 136–145)
TRIGL SERPL-MCNC: 216 MG/DL (ref 0–150)
VLDLC SERPL CALC-MCNC: 43 MG/DL
WBC # BLD: 6.3 K/UL (ref 4–11)

## 2022-10-27 PROCEDURE — 83036 HEMOGLOBIN GLYCOSYLATED A1C: CPT

## 2022-10-27 PROCEDURE — 6370000000 HC RX 637 (ALT 250 FOR IP): Performed by: INTERNAL MEDICINE

## 2022-10-27 PROCEDURE — 36415 COLL VENOUS BLD VENIPUNCTURE: CPT

## 2022-10-27 PROCEDURE — 85027 COMPLETE CBC AUTOMATED: CPT

## 2022-10-27 PROCEDURE — 80048 BASIC METABOLIC PNL TOTAL CA: CPT

## 2022-10-27 PROCEDURE — 80061 LIPID PANEL: CPT

## 2022-10-27 RX ORDER — ATORVASTATIN CALCIUM 80 MG/1
80 TABLET, FILM COATED ORAL NIGHTLY
Qty: 30 TABLET | Refills: 0 | Status: CANCELLED | OUTPATIENT
Start: 2022-10-27

## 2022-10-27 RX ORDER — ROSUVASTATIN CALCIUM 5 MG/1
5 TABLET, COATED ORAL DAILY
Qty: 30 TABLET | COMMUNITY
Start: 2022-10-27

## 2022-10-27 RX ORDER — ASPIRIN 81 MG/1
81 TABLET ORAL DAILY
Qty: 30 TABLET | Refills: 0 | Status: SHIPPED | OUTPATIENT
Start: 2022-10-28 | End: 2022-11-27

## 2022-10-27 RX ADMIN — ASPIRIN 81 MG: 81 TABLET, COATED ORAL at 08:49

## 2022-10-27 RX ADMIN — RIVAROXABAN 20 MG: 20 TABLET, FILM COATED ORAL at 08:50

## 2022-10-27 RX ADMIN — INSULIN GLARGINE 10 UNITS: 100 INJECTION, SOLUTION SUBCUTANEOUS at 08:50

## 2022-10-27 RX ADMIN — HYDROCHLOROTHIAZIDE 25 MG: 25 TABLET ORAL at 08:50

## 2022-10-27 RX ADMIN — LOSARTAN POTASSIUM 100 MG: 100 TABLET, FILM COATED ORAL at 08:49

## 2022-10-27 RX ADMIN — LEVOTHYROXINE SODIUM 50 MCG: 0.05 TABLET ORAL at 08:49

## 2022-10-27 RX ADMIN — AMLODIPINE BESYLATE 10 MG: 5 TABLET ORAL at 08:49

## 2022-10-27 NOTE — PROGRESS NOTES
MRI of the brain showed small left parietal stroke  Continue Xarelto and baby aspirin  Can be discharged from neurology if medically stable.

## 2022-10-27 NOTE — CARE COORDINATION
Writer reviewed chart. Patient IPTA and denies any needs on discharge. MRI was positive for stroke. Will continue to follow.     Emerson Cerna RN

## 2022-10-27 NOTE — PROGRESS NOTES
Patient  given discharge instructions. All questions and concerns were addressed. IV and tele removed without complications. Patient wheeled to car with all patient belongings.

## 2024-10-04 ENCOUNTER — HOSPITAL ENCOUNTER (OUTPATIENT)
Dept: PHYSICAL THERAPY | Age: 74
Setting detail: THERAPIES SERIES
Discharge: HOME | End: 2024-10-04
Payer: MEDICARE

## 2024-10-04 DIAGNOSIS — G89.29 CHRONIC MIDLINE LOW BACK PAIN WITH RIGHT-SIDED SCIATICA: Primary | ICD-10-CM

## 2024-10-04 DIAGNOSIS — M54.41 CHRONIC MIDLINE LOW BACK PAIN WITH RIGHT-SIDED SCIATICA: Primary | ICD-10-CM

## 2024-10-04 DIAGNOSIS — R26.89 DECREASED MOBILITY: ICD-10-CM

## 2024-10-04 PROCEDURE — 97530 THERAPEUTIC ACTIVITIES: CPT | Performed by: PHYSICAL THERAPIST

## 2024-10-04 PROCEDURE — 97161 PT EVAL LOW COMPLEX 20 MIN: CPT | Performed by: PHYSICAL THERAPIST

## 2024-10-04 NOTE — PLAN OF CARE
[facility]- Outpatient Rehabilitation and Therapy  [address] [place] [address] office: [phone] fax: [phone]     Physical Therapy Initial Evaluation Certification      Dear Sara West MD,    We had the pleasure of evaluating the following patient for physical therapy services at [facility] Outpatient Physical Therapy.  A summary of our findings can be found in the initial assessment below.  This includes our plan of care.  If you have any questions or concerns regarding these findings, please do not hesitate to contact me at the office phone number listed above.  Thank you for the referral.     Physician Signature:_______________________________Date:__________________  By signing above (or electronic signature), therapist’s plan is approved by physician       Physical Therapy: TREATMENT/PROGRESS NOTE   Patient: Elfego Rodney Jr (74 y.o. male)   Examination Date: 10/04/2024   :  1950 MRN: [id number]   Visit #: 1   Insurance Allowable Auth Needed   MN [x]Yes    []No    Insurance: Payor: [facility] MEDICARE / Plan: [facility] MEDICARE ADVANTAGE / Product Type: *No Product type* /   Insurance ID: [id number] - (Medicare Managed)  Secondary Insurance (if applicable):    Treatment Diagnosis:     ICD-10-CM    1. Chronic midline low back pain with right-sided sciatica  M54.41     G89.29       2. Decreased mobility  R26.89          Medical Diagnosis:  Radiculopathy, lumbar region [M54.16]   Referring Physician: Sara West MD  PCP: Rashaad Huber MD     Plan of care signed (Y/N):     Date of Patient follow up with Physician:      Progress Report/POC: EVAL today  POC update due: (10 visits /OR AUTH LIMITS, whichever is less)  2024                                             Medical History:  Comorbidities:  Cancer/Tumor  Diabetes (Type I or II)  Hypertension  Stroke/TIA  Osteoarthritis  Relevant Medical History: Neuropathy.

## 2024-10-08 ENCOUNTER — HOSPITAL ENCOUNTER (OUTPATIENT)
Dept: PHYSICAL THERAPY | Age: 74
Setting detail: THERAPIES SERIES
Discharge: HOME | End: 2024-10-08
Payer: MEDICARE

## 2024-10-08 PROCEDURE — 97113 AQUATIC THERAPY/EXERCISES: CPT

## 2024-10-08 PROCEDURE — 97150 GROUP THERAPEUTIC PROCEDURES: CPT

## 2024-10-08 NOTE — FLOWSHEET NOTE
[facility]- Outpatient Rehabilitation and Therapy  [address] [place] [address] office: [phone] fax: [phone]     Physical Therapy Daily Note      Dear Sara West MD,    We had the pleasure of evaluating the following patient for physical therapy services at [facility] Outpatient Physical Therapy.  A summary of our findings can be found in the initial assessment below.  This includes our plan of care.  If you have any questions or concerns regarding these findings, please do not hesitate to contact me at the office phone number listed above.  Thank you for the referral.     Physician Signature:_______________________________Date:__________________  By signing above (or electronic signature), therapist’s plan is approved by physician       Physical Therapy: TREATMENT/PROGRESS NOTE   Patient: Elfego Rodney Jr (74 y.o. male)   Examination Date: 10/08/2024   :  1950 MRN: [id number]   Visit #: 2   Insurance Allowable Auth Needed   MN [x]Yes    []No    Insurance: Payor: [facility] MEDICARE / Plan: [facility] MEDICARE ADVANTAGE / Product Type: *No Product type* /   Insurance ID: [id number] - (Medicare Managed)  Secondary Insurance (if applicable):    Treatment Diagnosis:     ICD-10-CM    1. Chronic midline low back pain with right-sided sciatica  M54.41     G89.29       2. Decreased mobility  R26.89          Medical Diagnosis:  Radiculopathy, lumbar region [M54.16]   Referring Physician: Sara West MD  PCP: Rashaad Huber MD     Plan of care signed (Y/N):     Date of Patient follow up with Physician:      Progress Report/POC: EVAL today  POC update due: (10 visits /OR AUTH LIMITS, whichever is less)  2024                                             Medical History:  Comorbidities:  Cancer/Tumor  Diabetes (Type I or II)  Hypertension  Stroke/TIA  Osteoarthritis  Relevant Medical History: Neuropathy.                                         Precautions/

## 2024-10-10 ENCOUNTER — HOSPITAL ENCOUNTER (OUTPATIENT)
Dept: PHYSICAL THERAPY | Age: 74
Setting detail: THERAPIES SERIES
Discharge: HOME | End: 2024-10-10
Payer: MEDICARE

## 2024-10-10 PROCEDURE — 97150 GROUP THERAPEUTIC PROCEDURES: CPT | Performed by: PHYSICAL THERAPIST

## 2024-10-10 PROCEDURE — 97113 AQUATIC THERAPY/EXERCISES: CPT | Performed by: PHYSICAL THERAPIST

## 2024-10-10 NOTE — FLOWSHEET NOTE
[facility]- Outpatient Rehabilitation and Therapy  [address] [place] [address] office: [phone] fax: [phone]       Physical Therapy: TREATMENT/PROGRESS NOTE   Patient: Elfego Rodney Jr (74 y.o. male)   Examination Date: 10/10/2024   :  1950 MRN: [id number]   Visit #: 3   Insurance Allowable Auth Needed   MN [x]Yes    []No    Insurance: Payor: [facility] MEDICARE / Plan: [facility] MEDICARE ADVANTAGE / Product Type: *No Product type* /   Insurance ID: [id number] - (Medicare Managed)  Secondary Insurance (if applicable):    Treatment Diagnosis:     ICD-10-CM    1. Chronic midline low back pain with right-sided sciatica  M54.41     G89.29       2. Decreased mobility  R26.89          Medical Diagnosis:  Radiculopathy, lumbar region [M54.16]   Referring Physician: Sara West MD  PCP: Rashaad Huber MD     Plan of care signed (Y/N):     Date of Patient follow up with Physician:      Progress Report/POC: NO  POC update due: (10 visits /OR AUTH LIMITS, whichever is less)  2024                                             Medical History:  Comorbidities:  Cancer/Tumor  Diabetes (Type I or II)  Hypertension  Stroke/TIA  Osteoarthritis  Relevant Medical History: Neuropathy.                                         Precautions/ Contra-indications:           Latex allergy:  NO  Pacemaker:    NO  Contraindications for Manipulation: None  Date of Surgery: NA  Other:    Red Flags:  None    Suicide Screening:   The patient did not verbalize a primary behavioral concern, suicidal ideation, suicidal intent, or demonstrate suicidal behaviors.    Preferred Language for Healthcare:   [x] English       [] other:    SUBJECTIVE EXAMINATION     Patient stated complaint: Patient reports that last last visit in the pool went well.  Had no increase in symptoms.    From MD Note (Imaging review):  - Review of MRI C-spine 24 no high grade cord compression, ventral cord effacement at C5-6 and

## 2024-10-15 ENCOUNTER — HOSPITAL ENCOUNTER (OUTPATIENT)
Dept: PHYSICAL THERAPY | Age: 74
Setting detail: THERAPIES SERIES
Discharge: HOME | End: 2024-10-15
Payer: MEDICARE

## 2024-10-15 PROCEDURE — 97113 AQUATIC THERAPY/EXERCISES: CPT | Performed by: PHYSICAL THERAPIST

## 2024-10-15 PROCEDURE — 97150 GROUP THERAPEUTIC PROCEDURES: CPT | Performed by: PHYSICAL THERAPIST

## 2024-10-15 NOTE — FLOWSHEET NOTE
[facility]- Outpatient Rehabilitation and Therapy  [address] [place] [address] office: [phone] fax: [phone]       Physical Therapy: TREATMENT/PROGRESS NOTE   Patient: Elfego Rodney Jr (74 y.o. male)   Examination Date: 10/15/2024   :  1950 MRN: [id number]   Visit #: 4   Insurance Allowable Auth Needed   MN [x]Yes    []No    Insurance: Payor: [facility] MEDICARE / Plan: [facility] MEDICARE ADVANTAGE / Product Type: *No Product type* /   Insurance ID: [id number] - (Medicare Managed)  Secondary Insurance (if applicable):    Treatment Diagnosis:     ICD-10-CM    1. Chronic midline low back pain with right-sided sciatica  M54.41     G89.29       2. Decreased mobility  R26.89          Medical Diagnosis:  Radiculopathy, lumbar region [M54.16]   Referring Physician: Sara West MD  PCP: Rashaad Huber MD     Plan of care signed (Y/N):     Date of Patient follow up with Physician:      Progress Report/POC: NO  POC update due: (10 visits /OR AUTH LIMITS, whichever is less)  2024                                             Medical History:  Comorbidities:  Cancer/Tumor  Diabetes (Type I or II)  Hypertension  Stroke/TIA  Osteoarthritis  Relevant Medical History: Neuropathy.                                         Precautions/ Contra-indications:           Latex allergy:  NO  Pacemaker:    NO  Contraindications for Manipulation: None  Date of Surgery: NA  Other:    Red Flags:  None    Suicide Screening:   The patient did not verbalize a primary behavioral concern, suicidal ideation, suicidal intent, or demonstrate suicidal behaviors.    Preferred Language for Healthcare:   [x] English       [] other:    SUBJECTIVE EXAMINATION     Patient stated complaint: No new complaints today.  Will be missing next weeks pool visit because of procedure that will require healing.       Test used Initial score  10/4/24 10/15/2024   Pain Summary VAS 2 4-5/10   Functional questionnaire Modified

## 2024-10-17 ENCOUNTER — HOSPITAL ENCOUNTER (OUTPATIENT)
Dept: PHYSICAL THERAPY | Age: 74
Setting detail: THERAPIES SERIES
Discharge: HOME | End: 2024-10-17
Payer: MEDICARE

## 2024-10-17 PROCEDURE — 97113 AQUATIC THERAPY/EXERCISES: CPT

## 2024-10-17 PROCEDURE — 97150 GROUP THERAPEUTIC PROCEDURES: CPT

## 2024-10-17 NOTE — FLOWSHEET NOTE
constant attendance of 2 or more patients simultaneously, but not providing any significant amount of measurable one-on-one time to either patient    GOALS     Patient stated goal: Strengthen back  [] Progressing: [] Met: [] Not Met: [] Adjusted    Therapist goals for Patient:   Short Term Goals: To be achieved in: 2 weeks  1. Patient will tolerate 40 minute aquatic therapy treatment  [] Progressing: [] Met: [] Not Met: [] Adjusted  2. Patient will have a decrease in pain to <2/10 to facilitate improvement in movement, function, and ADLs as indicated by Functional Deficits.  [] Progressing: [] Met: [] Not Met: [] Adjusted    Long Term Goals: To be achieved in: 8 weeks  1. Disability index score of 20% or less for the Modified Oswestry to assist with reaching prior level of function.  [] Progressing: [] Met: [] Not Met: [] Adjusted  2. Patient will demonstrate increased Strength of proximal hip to at least 5/5 throughout without pain to allow for proper functional mobility to enable patient to return to sit to stand without loss of balance.   [] Progressing: [] Met: [] Not Met: [] Adjusted  3. Patient will return to walking dogs >1 mile without increased symptoms or restriction.  [] Progressing: [] Met: [] Not Met: [] Adjusted  4.  Patient will be able to tolerate prolonged standing activities to allow transition to dry land therapy for focus on balance   [] Progressing: [] Met: [] Not Met: [] Adjusted     Overall Progression Towards Functional goals/ Treatment Progress Update:  [] Patient is progressing as expected towards functional goals listed.    [] Progression is slowed due to complexities/Impairments listed.  [] Progression has been slowed due to co-morbidities.  [x] Plan just implemented, too soon (<30days) to assess goals progression   [] Goals require adjustment due to lack of progress  [] Patient is not progressing as expected and requires additional follow up with physician  [] Other:     TREATMENT PLAN

## 2024-10-22 ENCOUNTER — APPOINTMENT (OUTPATIENT)
Dept: PHYSICAL THERAPY | Age: 74
End: 2024-10-22
Payer: MEDICARE

## 2024-10-24 ENCOUNTER — HOSPITAL ENCOUNTER (OUTPATIENT)
Dept: PHYSICAL THERAPY | Age: 74
Setting detail: THERAPIES SERIES
Discharge: HOME | End: 2024-10-24
Payer: MEDICARE

## 2024-10-24 PROCEDURE — 97140 MANUAL THERAPY 1/> REGIONS: CPT | Performed by: PHYSICAL THERAPIST

## 2024-10-24 PROCEDURE — 97110 THERAPEUTIC EXERCISES: CPT | Performed by: PHYSICAL THERAPIST

## 2024-10-24 PROCEDURE — 97112 NEUROMUSCULAR REEDUCATION: CPT | Performed by: PHYSICAL THERAPIST

## 2024-10-24 NOTE — FLOWSHEET NOTE
pain to <2/10 to facilitate improvement in movement, function, and ADLs as indicated by Functional Deficits.  [] Progressing: [] Met: [] Not Met: [] Adjusted    Long Term Goals: To be achieved in: 8 weeks  1. Disability index score of 20% or less for the Modified Oswestry to assist with reaching prior level of function.  [] Progressing: [] Met: [] Not Met: [] Adjusted  2. Patient will demonstrate increased Strength of proximal hip to at least 5/5 throughout without pain to allow for proper functional mobility to enable patient to return to sit to stand without loss of balance.   [] Progressing: [] Met: [] Not Met: [] Adjusted  3. Patient will return to walking dogs >1 mile without increased symptoms or restriction.  [] Progressing: [] Met: [] Not Met: [] Adjusted  4.  Patient will be able to tolerate prolonged standing activities to allow transition to dry land therapy for focus on balance   [] Progressing: [] Met: [] Not Met: [] Adjusted     Overall Progression Towards Functional goals/ Treatment Progress Update:  [] Patient is progressing as expected towards functional goals listed.    [] Progression is slowed due to complexities/Impairments listed.  [] Progression has been slowed due to co-morbidities.  [x] Plan just implemented, too soon (<30days) to assess goals progression   [] Goals require adjustment due to lack of progress  [] Patient is not progressing as expected and requires additional follow up with physician  [] Other:     TREATMENT PLAN       Plan: POC initiated as per evaluation; patient to transition to land-based PT for several weeks due to a procedure that will require him to be out of water.     Electronically Signed by Shaw Alexander PT  Date: 10/24/2024      Note: Portions of this note have been templated and/or copied from initial evaluation, reassessments and prior notes for documentation efficiency.      Note: If patient does not return for scheduled/recommended follow up visits, this note

## 2024-10-29 ENCOUNTER — APPOINTMENT (OUTPATIENT)
Dept: PHYSICAL THERAPY | Age: 74
End: 2024-10-29
Payer: MEDICARE

## 2024-10-31 ENCOUNTER — APPOINTMENT (OUTPATIENT)
Dept: PHYSICAL THERAPY | Age: 74
End: 2024-10-31
Payer: MEDICARE

## 2024-10-31 ENCOUNTER — HOSPITAL ENCOUNTER (OUTPATIENT)
Dept: PHYSICAL THERAPY | Age: 74
Setting detail: THERAPIES SERIES
Discharge: HOME | End: 2024-10-31
Payer: MEDICARE

## 2024-10-31 PROCEDURE — 97110 THERAPEUTIC EXERCISES: CPT | Performed by: PHYSICAL THERAPIST

## 2024-10-31 PROCEDURE — 97140 MANUAL THERAPY 1/> REGIONS: CPT | Performed by: PHYSICAL THERAPIST

## 2024-10-31 NOTE — FLOWSHEET NOTE
[facility]- Outpatient Rehabilitation and Therapy  [address] [place] [address] office: [phone] fax: [phone]       Physical Therapy: TREATMENT/PROGRESS NOTE   Patient: Elfego Rodney Jr (74 y.o. male)  \"MAC\" Examination Date: 10/31/2024   :  1950 MRN: [id number]   Visit #: 7 Approval dates: 10/4/24 - 24  Approved visits: 16  Approved codes: not code specific  Codes that DO NOT require auth: NA   Insurance Allowable Auth Needed   MN [x]Yes    []No    Insurance: Payor: [facility] MEDICARE / Plan: [facility] MEDICARE ADVANTAGE / Product Type: *No Product type* /   Insurance ID: [id number] - (Medicare Managed)  Secondary Insurance (if applicable):    Treatment Diagnosis:     ICD-10-CM    1. Chronic midline low back pain with right-sided sciatica  M54.41     G89.29       2. Decreased mobility  R26.89          Medical Diagnosis:  Radiculopathy, lumbar region [M54.16]   Referring Physician: Sara West MD  PCP: Rashaad Huber MD     Plan of care signed (Y/N):     Date of Patient follow up with Physician:      Progress Report/POC: NO  POC update due: (10 visits /OR AUTH LIMITS, whichever is less)  2024                                             Medical History:  Comorbidities:  Cancer/Tumor  Diabetes (Type I or II)  Hypertension  Stroke/TIA  Osteoarthritis  Relevant Medical History: Neuropathy.                                         Precautions/ Contra-indications:           Latex allergy:  NO  Pacemaker:    NO  Contraindications for Manipulation: None  Date of Surgery: NA  Other:    Red Flags:  None    Suicide Screening:   The patient did not verbalize a primary behavioral concern, suicidal ideation, suicidal intent, or demonstrate suicidal behaviors.    Preferred Language for Healthcare:   [x] English       [] other:    SUBJECTIVE EXAMINATION     Patient stated complaint: Overall doing okay. Experiencing more pain in the lower back but reports that he has been  results found for: WBC, RBC, HGB, HCT, MCV, RDW, PLT    CMP: No results found for: NA, K, CL, CO2, BUN, CREATININE, GFRAA, AGRATIO, LABGLOM, GLUCOSE, PROT, CALCIUM, BILITOT, ALKPHOS, AST, ALT    POC Tests: No results for input(s): POCGLU, POCNA, POCK, POCCL, POCBUN, POCHEMO, POCHCT in the last 72 hours. Coags: No results found for: PROTIME, INR, APTT    HCG (If Applicable): No results found for: PREGTESTUR, PREGSERUM, HCG, HCGQUANT     ABGs: No results found for: PHART, PO2ART, KQO8EXC, GCG9XZN, BEART, L8IBLFHN     Type & Screen (If Applicable):  No results found for: LABABO, LABRH    Drug/Infectious Status (If Applicable):  No results found for: HIV, HEPCAB    COVID-19 Screening (If Applicable):   Lab Results   Component Value Date    COVID19 NOT DETECTED 04/27/2021           Anesthesia Evaluation    Airway: Mallampati: Unable to assess / NA        Dental:          Pulmonary:Negative Pulmonary ROS                              Cardiovascular:Negative CV ROS                      Neuro/Psych:               GI/Hepatic/Renal:             Endo/Other:                     Abdominal:           Vascular:                                        Anesthesia Plan      general     ASA 1       Induction: inhalational.      Anesthetic plan and risks discussed with mother. Plan discussed with CRNA.                   Jacinda Badillo MD   4/28/2021

## 2024-11-05 ENCOUNTER — HOSPITAL ENCOUNTER (OUTPATIENT)
Dept: PHYSICAL THERAPY | Age: 74
Setting detail: THERAPIES SERIES
Discharge: HOME | End: 2024-11-05
Payer: MEDICARE

## 2024-11-05 PROCEDURE — 97150 GROUP THERAPEUTIC PROCEDURES: CPT | Performed by: PHYSICAL THERAPIST

## 2024-11-05 PROCEDURE — 97113 AQUATIC THERAPY/EXERCISES: CPT | Performed by: PHYSICAL THERAPIST

## 2024-11-05 NOTE — FLOWSHEET NOTE
[facility]- Outpatient Rehabilitation and Therapy  [address] [place] [address] office: [phone] fax: [phone]       Physical Therapy: TREATMENT/PROGRESS NOTE   Patient: Elfego Rodney Jr (74 y.o. male)  \"MAC\" Examination Date: 2024   :  1950 MRN: [id number]   Visit #: 8 Approval dates: 10/4/24 - 24  Approved visits: 16  Approved codes: not code specific  Codes that DO NOT require auth: NA   Insurance Allowable Auth Needed   MN [x]Yes    []No    Insurance: Payor: [facility] MEDICARE / Plan: [facility] MEDICARE ADVANTAGE / Product Type: *No Product type* /   Insurance ID: [id number] - (Medicare Managed)  Secondary Insurance (if applicable):    Treatment Diagnosis:     ICD-10-CM    1. Chronic midline low back pain with right-sided sciatica  M54.41     G89.29       2. Decreased mobility  R26.89          Medical Diagnosis:  Radiculopathy, lumbar region [M54.16]   Referring Physician: Sara West MD  PCP: Rashaad Huber MD     Plan of care signed (Y/N):     Date of Patient follow up with Physician:      Progress Report/POC: NO  POC update due: (10 visits /OR AUTH LIMITS, whichever is less)  2024                                             Medical History:  Comorbidities:  Cancer/Tumor  Diabetes (Type I or II)  Hypertension  Stroke/TIA  Osteoarthritis  Relevant Medical History: Neuropathy.                                         Precautions/ Contra-indications:           Latex allergy:  NO  Pacemaker:    NO  Contraindications for Manipulation: None  Date of Surgery: NA  Other:    Red Flags:  None    Suicide Screening:   The patient did not verbalize a primary behavioral concern, suicidal ideation, suicidal intent, or demonstrate suicidal behaviors.    Preferred Language for Healthcare:   [x] English       [] other:    SUBJECTIVE EXAMINATION     Patient stated complaint: Experiencing some back pain that is radiating down into the L knee today.     Test used

## 2024-11-07 ENCOUNTER — HOSPITAL ENCOUNTER (OUTPATIENT)
Dept: PHYSICAL THERAPY | Age: 74
Setting detail: THERAPIES SERIES
Discharge: HOME | End: 2024-11-07
Payer: MEDICARE

## 2024-11-07 ENCOUNTER — APPOINTMENT (OUTPATIENT)
Dept: PHYSICAL THERAPY | Age: 74
End: 2024-11-07
Payer: MEDICARE

## 2024-11-07 PROCEDURE — 97150 GROUP THERAPEUTIC PROCEDURES: CPT | Performed by: PHYSICAL THERAPIST

## 2024-11-07 PROCEDURE — 97113 AQUATIC THERAPY/EXERCISES: CPT | Performed by: PHYSICAL THERAPIST

## 2024-11-07 NOTE — FLOWSHEET NOTE
[facility]- Outpatient Rehabilitation and Therapy  [address] [place] [address] office: [phone] fax: [phone]       Physical Therapy: TREATMENT/PROGRESS NOTE   Patient: Elfego Rodney Jr (74 y.o. male)  \"MAC\" Examination Date: 2024   :  1950 MRN: [id number]   Visit #: 9 Approval dates: 10/4/24 - 24  Approved visits: 16  Approved codes: not code specific  Codes that DO NOT require auth: NA   Insurance Allowable Auth Needed   MN [x]Yes    []No    Insurance: Payor: [facility] MEDICARE / Plan: [facility] MEDICARE ADVANTAGE / Product Type: *No Product type* /   Insurance ID: [id number] - (Medicare Managed)  Secondary Insurance (if applicable):    Treatment Diagnosis:     ICD-10-CM    1. Chronic midline low back pain with right-sided sciatica  M54.41     G89.29       2. Decreased mobility  R26.89          Medical Diagnosis:  Radiculopathy, lumbar region [M54.16]   Referring Physician: Sara West MD  PCP: Rashaad Huber MD     Plan of care signed (Y/N):     Date of Patient follow up with Physician:      Progress Report/POC: NO  POC update due: (10 visits /OR AUTH LIMITS, whichever is less)  2024                                             Medical History:  Comorbidities:  Cancer/Tumor  Diabetes (Type I or II)  Hypertension  Stroke/TIA  Osteoarthritis  Relevant Medical History: Neuropathy.                                         Precautions/ Contra-indications:           Latex allergy:  NO  Pacemaker:    NO  Contraindications for Manipulation: None  Date of Surgery: NA  Other:    Red Flags:  None    Suicide Screening:   The patient did not verbalize a primary behavioral concern, suicidal ideation, suicidal intent, or demonstrate suicidal behaviors.    Preferred Language for Healthcare:   [x] English       [] other:    SUBJECTIVE EXAMINATION     Patient stated complaint: Still having pain in the back that goes down L leg into back of knee.  Did the HEP this

## 2024-11-12 ENCOUNTER — HOSPITAL ENCOUNTER (OUTPATIENT)
Dept: PHYSICAL THERAPY | Age: 74
Setting detail: THERAPIES SERIES
Discharge: HOME | End: 2024-11-12
Payer: MEDICARE

## 2024-11-12 PROCEDURE — 97150 GROUP THERAPEUTIC PROCEDURES: CPT | Performed by: PHYSICAL THERAPIST

## 2024-11-12 PROCEDURE — 97113 AQUATIC THERAPY/EXERCISES: CPT | Performed by: PHYSICAL THERAPIST

## 2024-11-12 NOTE — FLOWSHEET NOTE
[facility]- Outpatient Rehabilitation and Therapy  [address] [place] [address] office: [phone] fax: [phone]       Physical Therapy: TREATMENT/PROGRESS NOTE   Patient: Elfego Rodney Jr (74 y.o. male)  \"MAC\" Examination Date: 2024   :  1950 MRN: [id number]   Visit #: 10 Approval dates: 10/4/24 - 24  Approved visits: 16  Approved codes: not code specific  Codes that DO NOT require auth: NA   Insurance Allowable Auth Needed   MN [x]Yes    []No    Insurance: Payor: [facility] MEDICARE / Plan: [facility] MEDICARE ADVANTAGE / Product Type: *No Product type* /   Insurance ID: [id number] - (Medicare Managed)  Secondary Insurance (if applicable):    Treatment Diagnosis:     ICD-10-CM    1. Chronic midline low back pain with right-sided sciatica  M54.41     G89.29       2. Decreased mobility  R26.89          Medical Diagnosis:  Radiculopathy, lumbar region [M54.16]   Referring Physician: Sara West MD  PCP: Rashaad Huber MD     Plan of care signed (Y/N):     Date of Patient follow up with Physician:      Progress Report/POC: NO  POC update due: (10 visits /OR AUTH LIMITS, whichever is less)  2024                                             Medical History:  Comorbidities:  Cancer/Tumor  Diabetes (Type I or II)  Hypertension  Stroke/TIA  Osteoarthritis  Relevant Medical History: Neuropathy.                                         Precautions/ Contra-indications:           Latex allergy:  NO  Pacemaker:    NO  Contraindications for Manipulation: None  Date of Surgery: NA  Other:    Red Flags:  None    Suicide Screening:   The patient did not verbalize a primary behavioral concern, suicidal ideation, suicidal intent, or demonstrate suicidal behaviors.    Preferred Language for Healthcare:   [x] English       [] other:    SUBJECTIVE EXAMINATION     Patient stated complaint: Pain on L post thigh is 8/10.  Hamstring pain is not getting any better.     Test used

## 2024-11-14 ENCOUNTER — APPOINTMENT (OUTPATIENT)
Dept: PHYSICAL THERAPY | Age: 74
End: 2024-11-14
Payer: MEDICARE

## 2024-11-19 ENCOUNTER — HOSPITAL ENCOUNTER (OUTPATIENT)
Dept: PHYSICAL THERAPY | Age: 74
Setting detail: THERAPIES SERIES
Discharge: HOME | End: 2024-11-19
Payer: MEDICARE

## 2024-11-19 PROCEDURE — 97530 THERAPEUTIC ACTIVITIES: CPT

## 2024-11-19 PROCEDURE — 97113 AQUATIC THERAPY/EXERCISES: CPT

## 2024-11-19 PROCEDURE — 97110 THERAPEUTIC EXERCISES: CPT

## 2024-11-19 PROCEDURE — 97150 GROUP THERAPEUTIC PROCEDURES: CPT

## 2024-11-19 NOTE — PLAN OF CARE
EXERCISE - Provided verbal/tactile cueing for activities related to strengthening, flexibility, endurance, ROM performed to prevent loss of range of motion, maintain or improve muscular strength or increase flexibility, following either an injury or surgery.   (90488) THERAPEUTIC ACTIVITY - use of dynamic activities to improve functional performance. (Ex include squatting, ascending/descending stairs, walking, bending, lifting, catching, throwing, pushing, pulling, jumping.)  Direct, one on one contact, billed in 15-minute increments.  (27749) AQUATIC THERAPY - Therapeutic procedure, 1 or more areas, each 15 minutes. Provided verbal/tactile cueing in aquatic environment for activities related to strengthening, flexibility, endurance, ROM performed to prevent loss of range of motion, maintain or improve muscular strength or increase flexibility, following either an injury or surgery  (13711) GROUP - Provided skilled therapy interventions with constant attendance of 2 or more patients simultaneously, but not providing any significant amount of measurable one-on-one time to either patient    GOALS     Patient stated goal: Strengthen back  [x] Progressing: [] Met: [] Not Met: [] Adjusted    Therapist goals for Patient:   Short Term Goals: To be achieved in: 2 weeks  1. Patient will tolerate 40 minute aquatic therapy treatment  [] Progressing: [x] Met: [] Not Met: [] Adjusted  2. Patient will have a decrease in pain to <2/10 to facilitate improvement in movement, function, and ADLs as indicated by Functional Deficits.  [x] Progressing: [] Met: [] Not Met: [] Adjusted    Long Term Goals: To be achieved in: 8 weeks  1. Disability index score of 20% or less for the Modified Oswestry to assist with reaching prior level of function.  [x] Progressing: [] Met: [] Not Met: [] Adjusted  2. Patient will demonstrate increased Strength of proximal hip to at least 5/5 throughout without pain to allow for proper functional mobility to

## 2024-11-21 ENCOUNTER — HOSPITAL ENCOUNTER (OUTPATIENT)
Dept: PHYSICAL THERAPY | Age: 74
Setting detail: THERAPIES SERIES
Discharge: HOME | End: 2024-11-21
Payer: MEDICARE

## 2024-11-21 PROCEDURE — 97113 AQUATIC THERAPY/EXERCISES: CPT

## 2024-11-21 PROCEDURE — 97150 GROUP THERAPEUTIC PROCEDURES: CPT

## 2024-11-21 NOTE — FLOWSHEET NOTE
demonstrate increased Strength of proximal hip to at least 5/5 throughout without pain to allow for proper functional mobility to enable patient to return to sit to stand without loss of balance.   [x] Progressing: [] Met: [] Not Met: [] Adjusted  3. Patient will return to walking dogs >1 mile without increased symptoms or restriction.  [] Progressing: [] Met: [x] Not Met: [] Adjusted  4.  Patient will be able to tolerate prolonged standing activities to allow transition to dry land therapy for focus on balance   [x] Progressing: [] Met: [] Not Met: [] Adjusted     Overall Progression Towards Functional goals/ Treatment Progress Update:  [x] Patient is progressing as expected towards functional goals listed.    [] Progression is slowed due to complexities/Impairments listed.  [] Progression has been slowed due to co-morbidities.  [] Plan just implemented, too soon (<30days) to assess goals progression   [] Goals require adjustment due to lack of progress  [] Patient is not progressing as expected and requires additional follow up with physician  [] Other:     TREATMENT PLAN       Plan: Cont POC- Continue emphasis/focus on exercise progression, improving proper muscle recruitment and activation/motor control patterns, modulating pain, and static and dynamic balance. Next visit plan to progress aquatic PT     Electronically Signed by Isabel Llanos PT  Date: 11/21/2024      Note: Portions of this note have been templated and/or copied from initial evaluation, reassessments and prior notes for documentation efficiency.      Note: If patient does not return for scheduled/recommended follow up visits, this note will serve as a discharge from care along with the most recent update on progress.

## 2024-11-26 ENCOUNTER — HOSPITAL ENCOUNTER (OUTPATIENT)
Dept: PHYSICAL THERAPY | Age: 74
Setting detail: THERAPIES SERIES
Discharge: HOME | End: 2024-11-26
Payer: MEDICARE

## 2024-11-26 PROCEDURE — 97113 AQUATIC THERAPY/EXERCISES: CPT | Performed by: PHYSICAL THERAPIST

## 2024-11-26 PROCEDURE — 97150 GROUP THERAPEUTIC PROCEDURES: CPT | Performed by: PHYSICAL THERAPIST

## 2024-11-26 NOTE — FLOWSHEET NOTE
activation/motor control patterns, modulating pain, and static and dynamic balance. Next visit plan to progress aquatic PT     Electronically Signed by Shaw Alexander, PT  Date: 11/26/2024      Note: Portions of this note have been templated and/or copied from initial evaluation, reassessments and prior notes for documentation efficiency.      Note: If patient does not return for scheduled/recommended follow up visits, this note will serve as a discharge from care along with the most recent update on progress.

## 2024-12-03 ENCOUNTER — HOSPITAL ENCOUNTER (OUTPATIENT)
Dept: PHYSICAL THERAPY | Age: 74
Setting detail: THERAPIES SERIES
Discharge: HOME | End: 2024-12-03
Payer: MEDICARE

## 2024-12-03 PROCEDURE — 97113 AQUATIC THERAPY/EXERCISES: CPT

## 2024-12-03 PROCEDURE — 97150 GROUP THERAPEUTIC PROCEDURES: CPT

## 2024-12-03 NOTE — FLOWSHEET NOTE
and dynamic balance. Next visit plan to progress aquatic PT     Electronically Signed by Isabel Llanos PT  Date: 12/03/2024      Note: Portions of this note have been templated and/or copied from initial evaluation, reassessments and prior notes for documentation efficiency.      Note: If patient does not return for scheduled/recommended follow up visits, this note will serve as a discharge from care along with the most recent update on progress.

## 2024-12-05 ENCOUNTER — APPOINTMENT (OUTPATIENT)
Dept: PHYSICAL THERAPY | Age: 74
End: 2024-12-05
Payer: MEDICARE

## 2024-12-10 ENCOUNTER — HOSPITAL ENCOUNTER (OUTPATIENT)
Dept: PHYSICAL THERAPY | Age: 74
Setting detail: THERAPIES SERIES
Discharge: HOME | End: 2024-12-10
Payer: MEDICARE

## 2024-12-10 PROCEDURE — 97150 GROUP THERAPEUTIC PROCEDURES: CPT

## 2024-12-10 PROCEDURE — 97113 AQUATIC THERAPY/EXERCISES: CPT

## 2024-12-10 NOTE — FLOWSHEET NOTE
(50542)    Estim Unattended (64972)     Ther. Act (84517)    Mech. Traction (84949)     Gait (07720)    Dry Needle 1-2 muscle (63407)     Aquatic Therex (45241) 20 1  Dry Needle 3+ muscle (20561)     Iontophoresis (69641)    VASO (10853)     Ultrasound (91372)    Group Therapy (10210) 1    Estim Attended (63866)    Canalith Repositioning (40212)     Physical Performance Test (59246)         Other:    Other:    Total Timed Code Tx Minutes 20 1  1     Total Treatment Minutes 40        Charge Justification:  (02702) THERAPEUTIC EXERCISE - Provided verbal/tactile cueing for activities related to strengthening, flexibility, endurance, ROM performed to prevent loss of range of motion, maintain or improve muscular strength or increase flexibility, following either an injury or surgery.   (27434) THERAPEUTIC ACTIVITY - use of dynamic activities to improve functional performance. (Ex include squatting, ascending/descending stairs, walking, bending, lifting, catching, throwing, pushing, pulling, jumping.)  Direct, one on one contact, billed in 15-minute increments.  (42086) AQUATIC THERAPY - Therapeutic procedure, 1 or more areas, each 15 minutes. Provided verbal/tactile cueing in aquatic environment for activities related to strengthening, flexibility, endurance, ROM performed to prevent loss of range of motion, maintain or improve muscular strength or increase flexibility, following either an injury or surgery  (61736) GROUP - Provided skilled therapy interventions with constant attendance of 2 or more patients simultaneously, but not providing any significant amount of measurable one-on-one time to either patient    GOALS     Patient stated goal: Strengthen back  [x] Progressing: [] Met: [] Not Met: [] Adjusted    Therapist goals for Patient:   Short Term Goals: To be achieved in: 2 weeks  1. Patient will tolerate 40 minute aquatic therapy treatment  [] Progressing: [x] Met: [] Not Met: [] Adjusted  2. Patient will have a

## 2024-12-12 ENCOUNTER — APPOINTMENT (OUTPATIENT)
Dept: PHYSICAL THERAPY | Age: 74
End: 2024-12-12
Payer: MEDICARE

## 2024-12-17 ENCOUNTER — HOSPITAL ENCOUNTER (OUTPATIENT)
Dept: PHYSICAL THERAPY | Age: 74
Setting detail: THERAPIES SERIES
Discharge: HOME | End: 2024-12-17
Payer: MEDICARE

## 2024-12-17 PROCEDURE — 97150 GROUP THERAPEUTIC PROCEDURES: CPT

## 2024-12-17 PROCEDURE — 97113 AQUATIC THERAPY/EXERCISES: CPT

## 2024-12-17 NOTE — FLOWSHEET NOTE
reassessments and prior notes for documentation efficiency.      Note: If patient does not return for scheduled/recommended follow up visits, this note will serve as a discharge from care along with the most recent update on progress.

## 2024-12-19 ENCOUNTER — HOSPITAL ENCOUNTER (OUTPATIENT)
Dept: PHYSICAL THERAPY | Age: 74
Setting detail: THERAPIES SERIES
Discharge: HOME | End: 2024-12-19
Payer: MEDICARE

## 2024-12-19 PROCEDURE — 97150 GROUP THERAPEUTIC PROCEDURES: CPT

## 2024-12-19 PROCEDURE — 97113 AQUATIC THERAPY/EXERCISES: CPT

## 2024-12-19 NOTE — FLOWSHEET NOTE
Adjusted  2. Patient will have a decrease in pain to <2/10 to facilitate improvement in movement, function, and ADLs as indicated by Functional Deficits.  [x] Progressing: [] Met: [] Not Met: [] Adjusted    Long Term Goals: To be achieved in: 8 weeks  1. Disability index score of 20% or less for the Modified Oswestry to assist with reaching prior level of function.  [x] Progressing: [] Met: [] Not Met: [] Adjusted  2. Patient will demonstrate increased Strength of proximal hip to at least 5/5 throughout without pain to allow for proper functional mobility to enable patient to return to sit to stand without loss of balance.   [x] Progressing: [] Met: [] Not Met: [] Adjusted  3. Patient will return to walking dogs >1 mile without increased symptoms or restriction.  [] Progressing: [] Met: [x] Not Met: [] Adjusted  4.  Patient will be able to tolerate prolonged standing activities to allow transition to dry land therapy for focus on balance   [x] Progressing: [] Met: [] Not Met: [] Adjusted     Overall Progression Towards Functional goals/ Treatment Progress Update:  [x] Patient is progressing as expected towards functional goals listed.    [] Progression is slowed due to complexities/Impairments listed.  [] Progression has been slowed due to co-morbidities.  [] Plan just implemented, too soon (<30days) to assess goals progression   [] Goals require adjustment due to lack of progress  [] Patient is not progressing as expected and requires additional follow up with physician  [] Other:     TREATMENT PLAN       Plan: Cont POC- Continue emphasis/focus on exercise progression, improving proper muscle recruitment and activation/motor control patterns, modulating pain, and static and dynamic balance. Next visit plan to progress aquatic PT     Electronically Signed by Isabel Llanos PT  Date: 12/19/2024      Note: Portions of this note have been templated and/or copied from initial evaluation, reassessments and prior notes

## 2024-12-26 ENCOUNTER — APPOINTMENT (OUTPATIENT)
Dept: PHYSICAL THERAPY | Age: 74
End: 2024-12-26
Payer: MEDICARE

## 2025-01-02 ENCOUNTER — APPOINTMENT (OUTPATIENT)
Dept: PHYSICAL THERAPY | Age: 75
End: 2025-01-02
Payer: MEDICARE

## 2025-01-07 ENCOUNTER — HOSPITAL ENCOUNTER (OUTPATIENT)
Dept: PHYSICAL THERAPY | Age: 75
Setting detail: THERAPIES SERIES
Discharge: HOME | End: 2025-01-07
Payer: MEDICARE

## 2025-01-07 PROCEDURE — 97113 AQUATIC THERAPY/EXERCISES: CPT

## 2025-01-07 PROCEDURE — 97150 GROUP THERAPEUTIC PROCEDURES: CPT

## 2025-01-07 NOTE — FLOWSHEET NOTE
2x30 secs   Chin Tucks     Hamstring 2x30\"    UT Shrugs/Rolls     SKTC    Push Downs     Piriformis    Punching     Hip flexor    Wall Pushups     Pec Stretch    PNF Diagonals     Post Deltoid            Other                       Aquatic Abbreviation Key    B = Belt   DB = Dumbells   T = Theratube     H = Hydrotone   N = Noodles   W = Weights     P = Paddles   S = Speedo equipment   K = Kickboard     ASSESSMENT   Todays Assessment: During therapy this date, patient required visual cueing and verbal cueing for exercise progression and improving proper muscle recruitment and activation/motor control patterns.Patient will continue to benefit from ongoing evaluation and advanced clinical decision from a Physical Therapist to address and improve muscle strength, neuromuscular control, and balance and proprioception to safely return to PLOF without symptoms or restrictions.      Medical Necessity Documentation:  I certify that this patient meets the below criteria necessary for medical necessity for care and/or justification of therapy services:  The patient has functional impairments and/or activity limitations and would benefit from continued outpatient therapy services to address the deficits outlined in the patients goals  The patient has a musculoskeletal condition(s) with a corresponding ICD-10 code that is of complexity and severity that require skilled therapeutic intervention. This has a direct and significant impact on the need for therapy and significantly impacts the rate of recovery.   The patient has associated co-morbidities along with primary diagnosis which significantly impact the rate of recovery and contribute to complexities that require skilled therapeutic intervention    Prognosis for POC: [x] Good [] Fair  [] Poor    CHARGE CAPTURE   PT CHARGE GRID   CPT Code (TIMED) minutes # CPT Code (UNTIMED) #     Therex (40292)     EVAL:LOW (60018 - Typically 20 minutes face-to-face)     Neuromusc. Re-ed

## 2025-01-09 ENCOUNTER — HOSPITAL ENCOUNTER (OUTPATIENT)
Dept: PHYSICAL THERAPY | Age: 75
Setting detail: THERAPIES SERIES
End: 2025-01-09
Payer: MEDICARE

## 2025-01-09 ENCOUNTER — HOSPITAL ENCOUNTER (OUTPATIENT)
Dept: PHYSICAL THERAPY | Age: 75
Setting detail: THERAPIES SERIES
Discharge: HOME | End: 2025-01-09
Payer: MEDICARE

## 2025-01-09 PROCEDURE — 97150 GROUP THERAPEUTIC PROCEDURES: CPT

## 2025-01-09 PROCEDURE — 97164 PT RE-EVAL EST PLAN CARE: CPT

## 2025-01-09 PROCEDURE — 97113 AQUATIC THERAPY/EXERCISES: CPT

## 2025-01-09 NOTE — FLOWSHEET NOTE
equipment   K = Kickboard     ASSESSMENT   Todays Assessment: TRANSITION TO HEP/DC FORMAL PT: Patient is currently independent with symptom management and home exercise program. Patient reports understanding of the importance of continued compliance with home exercise program after discharge.  Patient has reached 0/5 long term goals and has bene discharged with the 30-day [facility] pass. Pt will return to therapy following surgery.      Medical Necessity Documentation:  I certify that this patient meets the below criteria necessary for medical necessity for care and/or justification of therapy services:  The patient has functional impairments and/or activity limitations and would benefit from continued outpatient therapy services to address the deficits outlined in the patients goals  The patient has a musculoskeletal condition(s) with a corresponding ICD-10 code that is of complexity and severity that require skilled therapeutic intervention. This has a direct and significant impact on the need for therapy and significantly impacts the rate of recovery.   The patient has associated co-morbidities along with primary diagnosis which significantly impact the rate of recovery and contribute to complexities that require skilled therapeutic intervention    Prognosis for POC: [x] Good [] Fair  [] Poor    CHARGE CAPTURE   PT CHARGE GRID   CPT Code (TIMED) minutes # CPT Code (UNTIMED) #     Therex (00629)     EVAL:LOW (61334 - Typically 20 minutes face-to-face)     Neuromusc. Re-ed (98395)    Re-Eval (04296) 1    Manual (55580)    Estim Unattended (39228)     Ther. Act (71979)    Mech. Traction (30469)     Gait (10470)    Dry Needle 1-2 muscle (27314)     Aquatic Therex (53018) 20 1  Dry Needle 3+ muscle (20561)     Iontophoresis (64013)    VASO (46307)     Ultrasound (29334)    Group Therapy (50016) 1    Estim Attended (90469)    Canalith Repositioning (83279)     Physical Performance Test (38088)         Other:    Other:

## 2025-03-03 ENCOUNTER — HOSPITAL ENCOUNTER (OUTPATIENT)
Dept: PHYSICAL THERAPY | Age: 75
Setting detail: THERAPIES SERIES
Discharge: HOME | End: 2025-03-03
Payer: MEDICARE

## 2025-03-03 PROCEDURE — 97161 PT EVAL LOW COMPLEX 20 MIN: CPT | Performed by: PHYSICAL THERAPIST

## 2025-03-03 PROCEDURE — 97530 THERAPEUTIC ACTIVITIES: CPT | Performed by: PHYSICAL THERAPIST

## 2025-03-03 NOTE — PLAN OF CARE
[facility] - Outpatient Rehabilitation and Therapy: [address] [place] [address] office: [phone] fax: [phone]     Physical Therapy Initial Evaluation Certification      Dear Sara West MD    We had the pleasure of evaluating the following patient for physical therapy services at [facility] Outpatient Physical Therapy.  A summary of our findings can be found in the initial assessment below.  This includes our plan of care.  If you have any questions or concerns regarding these findings, please do not hesitate to contact me at the office phone number listed above.  Thank you for the referral.     Physician Signature:_______________________________Date:__________________  By signing above (or electronic signature), therapist’s plan is approved by physician       Physical Therapy: TREATMENT/PROGRESS NOTE   Patient: Elfego Rodney Jr (74 y.o. male)   Examination Date: 2025   :  1950 MRN: [id number]   Visit #: 1   Insurance Allowable Auth Needed   mn [x]Yes    []No    Insurance: Payor: [facility] MEDICARE / Plan: [facility] MEDICARE COMPLETE / Product Type: *No Product type* /   Insurance ID: [id number] - (Medicare Managed)  Secondary Insurance (if applicable):    Treatment Diagnosis:   No diagnosis found.   Medical Diagnosis:  Radiculopathy, lumbar region [M54.16]   Referring Physician: Sara West MD  PCP: Rashaad Huber MD     Plan of care signed (Y/N):     Date of Patient follow up with Physician:      Progress Report/POC: EVAL today  POC update due: (10 visits /OR AUTH LIMITS, whichever is less)  2025                                             Medical History:  Comorbidities:  Hypertension  Stroke/TIA  Relevant Medical History: na                                         Precautions/ Contra-indications:           Latex allergy:  NO  Pacemaker:    NO  Contraindications for Manipulation: None  Date of Surgery: 25  Other:    Red Flags:  None    Suicide

## 2025-03-06 ENCOUNTER — HOSPITAL ENCOUNTER (OUTPATIENT)
Dept: PHYSICAL THERAPY | Age: 75
Setting detail: THERAPIES SERIES
Discharge: HOME | End: 2025-03-06
Payer: MEDICARE

## 2025-03-06 DIAGNOSIS — G89.29 CHRONIC BILATERAL LOW BACK PAIN WITH LEFT-SIDED SCIATICA: Primary | ICD-10-CM

## 2025-03-06 DIAGNOSIS — M54.42 CHRONIC BILATERAL LOW BACK PAIN WITH LEFT-SIDED SCIATICA: Primary | ICD-10-CM

## 2025-03-06 PROCEDURE — 97150 GROUP THERAPEUTIC PROCEDURES: CPT

## 2025-03-06 PROCEDURE — 97113 AQUATIC THERAPY/EXERCISES: CPT

## 2025-03-06 NOTE — FLOWSHEET NOTE
[facility] - Outpatient Rehabilitation and Therapy: [address] [place] [address] office: [phone] fax: [phone]       Physical Therapy: TREATMENT/PROGRESS NOTE   Patient: Elfego Rodney Jr (74 y.o. male)   Examination Date: 2025   :  1950 MRN: [id number]   Visit #: 20   Insurance Allowable Auth Needed   mn [x]Yes    []No    Insurance: Payor: [facility] MEDICARE / Plan: [facility] MEDICARE COMPLETE / Product Type: *No Product type* /   Insurance ID: [id number] - (Medicare Managed)  Secondary Insurance (if applicable):    Treatment Diagnosis:     ICD-10-CM    1. Chronic bilateral low back pain with left-sided sciatica  M54.42     G89.29          Medical Diagnosis:  Radiculopathy, lumbar region [M54.16]   Referring Physician: Sara West MD  PCP: Rashaad Huber MD     Plan of care signed (Y/N):     Date of Patient follow up with Physician:      Progress Report/POC: NO  POC update due: (10 visits /OR AUTH LIMITS, whichever is less)  2025                                             Medical History:  Comorbidities:  Hypertension  Stroke/TIA  Relevant Medical History: na                                         Precautions/ Contra-indications:           Latex allergy:  NO  Pacemaker:    NO  Contraindications for Manipulation: None  Date of Surgery: 25  Other:    Red Flags:  None    Suicide Screening:   The patient did not verbalize a primary behavioral concern, suicidal ideation, suicidal intent, or demonstrate suicidal behaviors.    Preferred Language for Healthcare:   [x] English       [] other:    SUBJECTIVE EXAMINATION     Patient stated complaint: Patient reports \"I used to have a 7/10 pain, now after the surgery it is like a 1/10\".     From eval: Patient has history of LBP and did aquatic ttherapy last year with some success but ultimately could not reduce the pain/symptoms to an acceptable level.  Patient had surgery 2025. Procedure(s): RIGHT LUMBAR

## 2025-03-11 ENCOUNTER — HOSPITAL ENCOUNTER (OUTPATIENT)
Dept: PHYSICAL THERAPY | Age: 75
Setting detail: THERAPIES SERIES
Discharge: HOME | End: 2025-03-11
Payer: MEDICARE

## 2025-03-11 PROCEDURE — 97113 AQUATIC THERAPY/EXERCISES: CPT | Performed by: PHYSICAL THERAPIST

## 2025-03-11 PROCEDURE — 97150 GROUP THERAPEUTIC PROCEDURES: CPT | Performed by: PHYSICAL THERAPIST

## 2025-03-11 NOTE — FLOWSHEET NOTE
[facility] - Outpatient Rehabilitation and Therapy: [address] [place] [address] office: [phone] fax: [phone]       Physical Therapy: TREATMENT/PROGRESS NOTE   Patient: Elfego Rodney Jr (74 y.o. male)   Examination Date: 2025   :  1950 MRN: [id number]   Visit #: 3   Insurance Allowable Auth Needed   mn [x]Yes    []No    Insurance: Payor: [facility] MEDICARE / Plan: [facility] MEDICARE COMPLETE / Product Type: *No Product type* /   Insurance ID: [id number] - (Medicare Managed)  Secondary Insurance (if applicable):    Treatment Diagnosis:     ICD-10-CM    1. Chronic bilateral low back pain with left-sided sciatica  M54.42     G89.29          Medical Diagnosis:  Radiculopathy, lumbar region [M54.16]   Referring Physician: Sara West MD  PCP: Rashaad Huber MD     Plan of care signed (Y/N):     Date of Patient follow up with Physician:      Progress Report/POC: NO  POC update due: (10 visits /OR AUTH LIMITS, whichever is less)  2025                                             Medical History:  Comorbidities:  Hypertension  Stroke/TIA  Relevant Medical History: na                                         Precautions/ Contra-indications:           Latex allergy:  NO  Pacemaker:    NO  Contraindications for Manipulation: None  Date of Surgery: 25  Other:    Red Flags:  None    Suicide Screening:   The patient did not verbalize a primary behavioral concern, suicidal ideation, suicidal intent, or demonstrate suicidal behaviors.    Preferred Language for Healthcare:   [x] English       [] other:    SUBJECTIVE EXAMINATION     Patient stated complaint: No new changes today.  Doing well back in the pool.  R knee is a little sore today for unknown reasons.    From eval: Patient has history of LBP and did aquatic ttherapy last year with some success but ultimately could not reduce the pain/symptoms to an acceptable level.  Patient had surgery 2025.

## 2025-03-13 ENCOUNTER — HOSPITAL ENCOUNTER (OUTPATIENT)
Dept: PHYSICAL THERAPY | Age: 75
Setting detail: THERAPIES SERIES
Discharge: HOME | End: 2025-03-13
Payer: MEDICARE

## 2025-03-13 PROCEDURE — 97113 AQUATIC THERAPY/EXERCISES: CPT

## 2025-03-13 PROCEDURE — 97150 GROUP THERAPEUTIC PROCEDURES: CPT

## 2025-03-13 NOTE — FLOWSHEET NOTE
[facility] - Outpatient Rehabilitation and Therapy: [address] [place] [address] office: [phone] fax: [phone]       Physical Therapy: TREATMENT/PROGRESS NOTE   Patient: Elfego Rodney Jr (74 y.o. male)   Examination Date: 2025   :  1950 MRN: [id number]   Visit #: 20   Insurance Allowable Auth Needed   mn [x]Yes    []No    Insurance: Payor: [facility] MEDICARE / Plan: [facility] MEDICARE COMPLETE / Product Type: *No Product type* /   Insurance ID: [id number] - (Medicare Managed)  Secondary Insurance (if applicable):    Treatment Diagnosis:     ICD-10-CM    1. Chronic bilateral low back pain with left-sided sciatica  M54.42     G89.29          Medical Diagnosis:  Radiculopathy, lumbar region [M54.16]   Referring Physician: Sara West MD  PCP: Rashaad Huber MD     Plan of care signed (Y/N):     Date of Patient follow up with Physician:      Progress Report/POC: NO  POC update due: (10 visits /OR AUTH LIMITS, whichever is less)  2025                                             Medical History:  Comorbidities:  Hypertension  Stroke/TIA  Relevant Medical History: na                                         Precautions/ Contra-indications:           Latex allergy:  NO  Pacemaker:    NO  Contraindications for Manipulation: None  Date of Surgery: 25  Other:    Red Flags:  None    Suicide Screening:   The patient did not verbalize a primary behavioral concern, suicidal ideation, suicidal intent, or demonstrate suicidal behaviors.    Preferred Language for Healthcare:   [x] English       [] other:    SUBJECTIVE EXAMINATION     Patient stated complaint: Patient reports \"I am not having any back pain today, just soreness where they worked on those muscles\".      From eval: Patient has history of LBP and did aquatic ttherapy last year with some success but ultimately could not reduce the pain/symptoms to an acceptable level.  Patient had surgery 2025.

## 2025-03-18 ENCOUNTER — HOSPITAL ENCOUNTER (OUTPATIENT)
Dept: PHYSICAL THERAPY | Age: 75
Setting detail: THERAPIES SERIES
Discharge: HOME | End: 2025-03-18
Payer: MEDICARE

## 2025-03-18 PROCEDURE — 97150 GROUP THERAPEUTIC PROCEDURES: CPT

## 2025-03-18 PROCEDURE — 97113 AQUATIC THERAPY/EXERCISES: CPT

## 2025-03-18 NOTE — FLOWSHEET NOTE
[facility] - Outpatient Rehabilitation and Therapy: [address] [place] [address] office: [phone] fax: [phone]       Physical Therapy: TREATMENT/PROGRESS NOTE   Patient: Elfego Rodney Jr (74 y.o. male)   Examination Date: 2025   :  1950 MRN: [id number]   Visit #: 21   Insurance Allowable Auth Needed   mn [x]Yes    []No    Insurance: Payor: [facility] MEDICARE / Plan: [facility] MEDICARE COMPLETE / Product Type: *No Product type* /   Insurance ID: [id number] - (Medicare Managed)  Secondary Insurance (if applicable):    Treatment Diagnosis:     ICD-10-CM    1. Chronic bilateral low back pain with left-sided sciatica  M54.42     G89.29          Medical Diagnosis:  Radiculopathy, lumbar region [M54.16]   Referring Physician: Sara West MD  PCP: Rashaad Huber MD     Plan of care signed (Y/N): NO    Date of Patient follow up with Physician: PRN     Progress Report/POC: NO  POC update due: (10 visits /OR AUTH LIMITS, whichever is less)  2025                                             Medical History:  Comorbidities:  Hypertension  Stroke/TIA  Relevant Medical History: na                                         Precautions/ Contra-indications:           Latex allergy:  NO  Pacemaker:    NO  Contraindications for Manipulation: None  Date of Surgery: 25  Other:    Red Flags:  None    Suicide Screening:   The patient did not verbalize a primary behavioral concern, suicidal ideation, suicidal intent, or demonstrate suicidal behaviors.    Preferred Language for Healthcare:   [x] English       [] other:    SUBJECTIVE EXAMINATION     Patient stated complaint: Patient reports \"I am not having any nerve pain today and muscle pain is only like a 1/10\".        From eval: Patient has history of LBP and did aquatic ttherapy last year with some success but ultimately could not reduce the pain/symptoms to an acceptable level.  Patient had surgery 2025. Procedure(s):

## 2025-03-20 ENCOUNTER — HOSPITAL ENCOUNTER (OUTPATIENT)
Dept: PHYSICAL THERAPY | Age: 75
Setting detail: THERAPIES SERIES
Discharge: HOME | End: 2025-03-20
Payer: MEDICARE

## 2025-03-20 PROCEDURE — 97113 AQUATIC THERAPY/EXERCISES: CPT

## 2025-03-20 PROCEDURE — 97150 GROUP THERAPEUTIC PROCEDURES: CPT

## 2025-03-20 NOTE — FLOWSHEET NOTE
[facility] - Outpatient Rehabilitation and Therapy: [address] [place] [address] office: [phone] fax: [phone]       Physical Therapy: TREATMENT/PROGRESS NOTE   Patient: Elfego Rodney Jr (74 y.o. male)   Examination Date: 2025   :  1950 MRN: [id number]   Visit #: 6   Insurance Allowable Auth Needed   mn [x]Yes    []No    Insurance: Payor: [facility] MEDICARE / Plan: [facility] MEDICARE COMPLETE / Product Type: *No Product type* /   Insurance ID: [id number] - (Medicare Managed)  Secondary Insurance (if applicable):    Treatment Diagnosis:     ICD-10-CM    1. Chronic bilateral low back pain with left-sided sciatica  M54.42     G89.29          Medical Diagnosis:  Radiculopathy, lumbar region [M54.16]   Referring Physician: Sara West MD  PCP: Rashaad Huber MD     Plan of care signed (Y/N): NO    Date of Patient follow up with Physician: PRN     Progress Report/POC: NO  POC update due: (10 visits /OR AUTH LIMITS, whichever is less)  2025                                             Medical History:  Comorbidities:  Hypertension  Stroke/TIA  Relevant Medical History: na                                         Precautions/ Contra-indications:           Latex allergy:  NO  Pacemaker:    NO  Contraindications for Manipulation: None  Date of Surgery: 25  Other:    Red Flags:  None    Suicide Screening:   The patient did not verbalize a primary behavioral concern, suicidal ideation, suicidal intent, or demonstrate suicidal behaviors.    Preferred Language for Healthcare:   [x] English       [] other:    SUBJECTIVE EXAMINATION     Patient stated complaint: Patient reports \"I was able to walk a lot yesterday\".  \"Just muscle pain today\".       From eval: Patient has history of LBP and did aquatic ttherapy last year with some success but ultimately could not reduce the pain/symptoms to an acceptable level.  Patient had surgery 2025. Procedure(s): RIGHT LUMBAR

## 2025-03-25 ENCOUNTER — HOSPITAL ENCOUNTER (OUTPATIENT)
Dept: PHYSICAL THERAPY | Age: 75
Setting detail: THERAPIES SERIES
Discharge: HOME | End: 2025-03-25
Payer: MEDICARE

## 2025-03-25 PROCEDURE — 97150 GROUP THERAPEUTIC PROCEDURES: CPT

## 2025-03-25 PROCEDURE — 97113 AQUATIC THERAPY/EXERCISES: CPT

## 2025-03-25 NOTE — FLOWSHEET NOTE
[facility] - Outpatient Rehabilitation and Therapy: [address] [place] [address] office: [phone] fax: [phone]       Physical Therapy: TREATMENT/PROGRESS NOTE   Patient: Elfego Rodney Jr (74 y.o. male)   Examination Date: 2025   :  1950 MRN: [id number]   Visit #: 7   Insurance Allowable Auth Needed   mn [x]Yes    []No    Insurance: Payor: [facility] MEDICARE / Plan: [facility] MEDICARE COMPLETE / Product Type: *No Product type* /   Insurance ID: [id number] - (Medicare Managed)  Secondary Insurance (if applicable):    Treatment Diagnosis:     ICD-10-CM    1. Chronic bilateral low back pain with left-sided sciatica  M54.42     G89.29          Medical Diagnosis:  Radiculopathy, lumbar region [M54.16]   Referring Physician: Sara West MD  PCP: Rashaad Huber MD     Plan of care signed (Y/N): NO    Date of Patient follow up with Physician: PRN     Progress Report/POC: NO  POC update due: (10 visits /OR AUTH LIMITS, whichever is less)  2025                                             Medical History:  Comorbidities:  Hypertension  Stroke/TIA  Relevant Medical History: na                                         Precautions/ Contra-indications:           Latex allergy:  NO  Pacemaker:    NO  Contraindications for Manipulation: None  Date of Surgery: 25  Other:    Red Flags:  None    Suicide Screening:   The patient did not verbalize a primary behavioral concern, suicidal ideation, suicidal intent, or demonstrate suicidal behaviors.    Preferred Language for Healthcare:   [x] English       [] other:    SUBJECTIVE EXAMINATION     Patient stated complaint: Pt reports that he is feeling pretty good - \"just muscular pain\"      From eval: Patient has history of LBP and did aquatic ttherapy last year with some success but ultimately could not reduce the pain/symptoms to an acceptable level.  Patient had surgery 2025. Procedure(s): RIGHT LUMBAR 3 - LUMBAR 5

## 2025-03-27 ENCOUNTER — HOSPITAL ENCOUNTER (OUTPATIENT)
Dept: PHYSICAL THERAPY | Age: 75
Setting detail: THERAPIES SERIES
Discharge: HOME | End: 2025-03-27
Payer: MEDICARE

## 2025-03-27 PROCEDURE — 97113 AQUATIC THERAPY/EXERCISES: CPT

## 2025-03-27 PROCEDURE — 97150 GROUP THERAPEUTIC PROCEDURES: CPT

## 2025-03-27 NOTE — FLOWSHEET NOTE
[facility] - Outpatient Rehabilitation and Therapy: [address] [place] [address] office: [phone] fax: [phone]       Physical Therapy: TREATMENT/PROGRESS NOTE   Patient: Elfego Rodney Jr (74 y.o. male)   Examination Date: 2025   :  1950 MRN: [id number]   Visit #: 20   Insurance Allowable Auth Needed   mn [x]Yes    []No    Insurance: Payor: [facility] MEDICARE / Plan: [facility] MEDICARE COMPLETE / Product Type: *No Product type* /   Insurance ID: [id number] - (Medicare Managed)  Secondary Insurance (if applicable):    Treatment Diagnosis:     ICD-10-CM    1. Chronic bilateral low back pain with left-sided sciatica  M54.42     G89.29          Medical Diagnosis:  Radiculopathy, lumbar region [M54.16]   Referring Physician: Sara eWst MD  PCP: Rashaad Huber MD     Plan of care signed (Y/N): NO    Date of Patient follow up with Physician: PRN     Progress Report/POC: NO  POC update due: (10 visits /OR AUTH LIMITS, whichever is less)  2025                                             Medical History:  Comorbidities:  Hypertension  Stroke/TIA  Relevant Medical History: na                                         Precautions/ Contra-indications:           Latex allergy:  NO  Pacemaker:    NO  Contraindications for Manipulation: None  Date of Surgery: 25  Other:    Red Flags:  None    Suicide Screening:   The patient did not verbalize a primary behavioral concern, suicidal ideation, suicidal intent, or demonstrate suicidal behaviors.    Preferred Language for Healthcare:   [x] English       [] other:    SUBJECTIVE EXAMINATION     Patient stated complaint: Pt reports that he is feeling pretty good - \"just muscular pain\"      From eval: Patient has history of LBP and did aquatic ttherapy last year with some success but ultimately could not reduce the pain/symptoms to an acceptable level.  Patient had surgery 2025. Procedure(s): RIGHT LUMBAR 3 - LUMBAR 5

## 2025-04-01 ENCOUNTER — APPOINTMENT (OUTPATIENT)
Dept: PHYSICAL THERAPY | Age: 75
End: 2025-04-01
Payer: MEDICARE

## 2025-04-03 ENCOUNTER — APPOINTMENT (OUTPATIENT)
Dept: PHYSICAL THERAPY | Age: 75
End: 2025-04-03
Payer: MEDICARE

## 2025-04-08 ENCOUNTER — HOSPITAL ENCOUNTER (OUTPATIENT)
Dept: PHYSICAL THERAPY | Age: 75
Setting detail: THERAPIES SERIES
Discharge: HOME | End: 2025-04-08
Payer: MEDICARE

## 2025-04-08 PROCEDURE — 97150 GROUP THERAPEUTIC PROCEDURES: CPT

## 2025-04-08 PROCEDURE — 97113 AQUATIC THERAPY/EXERCISES: CPT

## 2025-04-08 NOTE — FLOWSHEET NOTE
been slowed due to co-morbidities.  [] Plan just implemented, too soon (<30days) to assess goals progression   [] Goals require adjustment due to lack of progress  [] Patient is not progressing as expected and requires additional follow up with physician  [] Other:     TREATMENT PLAN     Frequency/Duration: 2x/week for 8 weeks for the following treatment interventions:    Interventions:  Therapeutic Exercise (66663) including: strength training, ROM, and functional mobility  Therapeutic Activities (45911) including: functional mobility training and education.  Neuromuscular Re-education (68971) activation and proprioception, including postural re-education.    Gait Training (77166) for normalization of ambulation patterns and AD training.   Manual Therapy (50428) as indicated to include: Passive Range of Motion and Soft Tissue Mobilization  Patient education on postural re-education and progression of HEP  Aquatic Therapy (71186)    Plan: Continue aquatic therapy    Electronically Signed by Fidel Hitchcock PT  Date: 04/08/2025      Note: Portions of this note have been templated and/or copied from initial evaluation, reassessments and prior notes for documentation efficiency.      Note: If patient does not return for scheduled/recommended follow up visits, this note will serve as a discharge from care along with the most recent update on progress.

## 2025-04-10 ENCOUNTER — HOSPITAL ENCOUNTER (OUTPATIENT)
Dept: PHYSICAL THERAPY | Age: 75
Setting detail: THERAPIES SERIES
Discharge: HOME | End: 2025-04-10
Payer: MEDICARE

## 2025-04-10 PROCEDURE — 97150 GROUP THERAPEUTIC PROCEDURES: CPT

## 2025-04-10 PROCEDURE — 97113 AQUATIC THERAPY/EXERCISES: CPT

## 2025-04-10 NOTE — FLOWSHEET NOTE
[facility] - Outpatient Rehabilitation and Therapy: [address] [place] [address] office: [phone] fax: [phone]       Physical Therapy: TREATMENT/PROGRESS NOTE   Patient: Elfego Rodney Jr (74 y.o. male)   Examination Date: 04/10/2025   :  1950 MRN: [id number]   Visit #: 22   Insurance Allowable Auth Needed   mn [x]Yes    []No    Insurance: Payor: [facility] MEDICARE / Plan: [facility] MEDICARE COMPLETE / Product Type: *No Product type* /   Insurance ID: [id number] - (Medicare Managed)  Secondary Insurance (if applicable):    Treatment Diagnosis:     ICD-10-CM    1. Chronic bilateral low back pain with left-sided sciatica  M54.42     G89.29          Medical Diagnosis:  Radiculopathy, lumbar region [M54.16]   Referring Physician: Sara West MD  PCP: Rashaad Huber MD     Plan of care signed (Y/N): NO    Date of Patient follow up with Physician: PRN     Progress Report/POC: NO  POC update due: (10 visits /OR AUTH LIMITS, whichever is less)  2025                                             Medical History:  Comorbidities:  Hypertension  Stroke/TIA  Relevant Medical History: na                                         Precautions/ Contra-indications:           Latex allergy:  NO  Pacemaker:    NO  Contraindications for Manipulation: None  Date of Surgery: 25  Other:    Red Flags:  None    Suicide Screening:   The patient did not verbalize a primary behavioral concern, suicidal ideation, suicidal intent, or demonstrate suicidal behaviors.    Preferred Language for Healthcare:   [x] English       [] other:    SUBJECTIVE EXAMINATION     Patient stated complaint: Pt reports feeling pretty good again today with pain only at 1/10.       From eval: Patient has history of LBP and did aquatic ttherapy last year with some success but ultimately could not reduce the pain/symptoms to an acceptable level.  Patient had surgery 2025. Procedure(s): RIGHT LUMBAR 3 - LUMBAR 5

## 2025-04-15 ENCOUNTER — APPOINTMENT (OUTPATIENT)
Dept: PHYSICAL THERAPY | Age: 75
End: 2025-04-15
Payer: MEDICARE

## 2025-04-17 ENCOUNTER — APPOINTMENT (OUTPATIENT)
Dept: PHYSICAL THERAPY | Age: 75
End: 2025-04-17
Payer: MEDICARE

## 2025-04-22 ENCOUNTER — HOSPITAL ENCOUNTER (OUTPATIENT)
Dept: PHYSICAL THERAPY | Age: 75
Setting detail: THERAPIES SERIES
Discharge: HOME | End: 2025-04-22
Payer: MEDICARE

## 2025-04-22 PROCEDURE — 97113 AQUATIC THERAPY/EXERCISES: CPT

## 2025-04-22 PROCEDURE — 97150 GROUP THERAPEUTIC PROCEDURES: CPT

## 2025-04-22 NOTE — FLOWSHEET NOTE
[facility] - Outpatient Rehabilitation and Therapy: [address] [place] [address] office: [phone] fax: [phone]       Physical Therapy: TREATMENT/PROGRESS NOTE   Patient: Elfego Rodney Jr (74 y.o. male)   Examination Date: 2025   :  1950 MRN: [id number]   Visit #: 9   Insurance Allowable Auth Needed   mn [x]Yes    []No    Insurance: Payor: [facility] MEDICARE / Plan: [facility] MEDICARE COMPLETE / Product Type: *No Product type* /   Insurance ID: [id number] - (Medicare Managed)  Secondary Insurance (if applicable):    Treatment Diagnosis:     ICD-10-CM    1. Chronic bilateral low back pain with left-sided sciatica  M54.42     G89.29          Medical Diagnosis:  Radiculopathy, lumbar region [M54.16]   Referring Physician: Sara West MD  PCP: Rashaad Huber MD     Plan of care signed (Y/N): NO    Date of Patient follow up with Physician: PRN     Progress Report/POC: NO  POC update due: (10 visits /OR AUTH LIMITS, whichever is less)  2025                                             Medical History:  Comorbidities:  Hypertension  Stroke/TIA  Relevant Medical History: na                                         Precautions/ Contra-indications:           Latex allergy:  NO  Pacemaker:    NO  Contraindications for Manipulation: None  Date of Surgery: 25  Other:    Red Flags:  None    Suicide Screening:   The patient did not verbalize a primary behavioral concern, suicidal ideation, suicidal intent, or demonstrate suicidal behaviors.    Preferred Language for Healthcare:   [x] English       [] other:    SUBJECTIVE EXAMINATION     Patient stated complaint: Patient reports \"my right knee has been bothering me, it feels like it wants to go in\".       From eval: Patient has history of LBP and did aquatic ttherapy last year with some success but ultimately could not reduce the pain/symptoms to an acceptable level.  Patient had surgery 2025. Procedure(s): RIGHT

## 2025-04-24 ENCOUNTER — HOSPITAL ENCOUNTER (OUTPATIENT)
Dept: PHYSICAL THERAPY | Age: 75
Setting detail: THERAPIES SERIES
Discharge: HOME | End: 2025-04-24
Payer: MEDICARE

## 2025-04-24 PROCEDURE — 97113 AQUATIC THERAPY/EXERCISES: CPT

## 2025-04-24 PROCEDURE — 97530 THERAPEUTIC ACTIVITIES: CPT

## 2025-04-24 PROCEDURE — 97150 GROUP THERAPEUTIC PROCEDURES: CPT

## 2025-04-24 NOTE — PLAN OF CARE
[facility] - Outpatient Rehabilitation and Therapy: [address] [place] [address] office: [phone] fax: [phone]    Physical Therapy Re-Certification Plan of Care    Dear Sara West MD  ,    We had the pleasure of treating the following patient for physical therapy services at [facility]. A summary of our findings can be found in the updated assessment below.  This includes our plan of care.  If you have any questions or concerns regarding these findings, please do not hesitate to contact me at the office phone number checked above.  Thank you for the referral.     Physician Signature:________________________________Date:__________________  By signing above (or electronic signature), therapist's plan is approved by physician      Total Visits: 10     Overall Response to Treatment:  Patient is responding well to treatment and improvement is noted with regards to goals, Patient should continue to improve in reasonable time if they continue HEP, and will benefit from continued skilled physical therapy to improve strength, balance, and tolerance to functional mobility.     Recommendation:    [x] Continue PT 2x / wk for 7 weeks.   [] Hold PT, pending MD visit   [] Discharge to HEP. Follow up with PT or MD PRN.        Physical Therapy: TREATMENT/PROGRESS NOTE   Patient: Elfego Rodney Jr (74 y.o. male)   Examination Date: 2025   :  1950 MRN: [id number]   Visit #: 10     Insurance Allowable Auth Needed   mn [x]Yes    []No    Insurance: Payor: [facility] MEDICARE / Plan: [facility] MEDICARE COMPLETE / Product Type: *No Product type* /   Insurance ID: [id number] - (Medicare Managed)  Secondary Insurance (if applicable):    Treatment Diagnosis:     ICD-10-CM    1. Chronic bilateral low back pain with left-sided sciatica  M54.42     G89.29          Medical Diagnosis:  Radiculopathy, lumbar region [M54.16]   Referring Physician: Sara West MD  PCP:

## 2025-04-29 ENCOUNTER — APPOINTMENT (OUTPATIENT)
Dept: PHYSICAL THERAPY | Age: 75
End: 2025-04-29
Payer: MEDICARE

## 2025-04-29 ENCOUNTER — HOSPITAL ENCOUNTER (OUTPATIENT)
Dept: PHYSICAL THERAPY | Age: 75
Setting detail: THERAPIES SERIES
Discharge: HOME | End: 2025-04-29
Payer: MEDICARE

## 2025-04-29 PROCEDURE — 97112 NEUROMUSCULAR REEDUCATION: CPT

## 2025-04-29 PROCEDURE — 97116 GAIT TRAINING THERAPY: CPT

## 2025-04-29 PROCEDURE — 97110 THERAPEUTIC EXERCISES: CPT

## 2025-04-29 NOTE — FLOWSHEET NOTE
[facility] - Outpatient Rehabilitation and Therapy: [address] [place] [address] office: [phone] fax: [phone]       Physical Therapy: TREATMENT/PROGRESS NOTE   Patient: Elfego Rodney Jr (74 y.o. male)   Examination Date: 2025   :  1950 MRN: [id number]   Visit #: 11     Insurance Allowable Auth Needed   mn [x]Yes    []No    Insurance: Payor: [facility] MEDICARE / Plan: [facility] MEDICARE COMPLETE / Product Type: *No Product type* /   Insurance ID: [id number] - (Medicare Managed)  Secondary Insurance (if applicable):    Treatment Diagnosis:     ICD-10-CM    1. Chronic bilateral low back pain with left-sided sciatica  M54.42     G89.29          Medical Diagnosis:  Radiculopathy, lumbar region [M54.16]   Referring Physician: Sara West MD  PCP: Rashaad Huber MD     Plan of care signed (Y/N): NO    Date of Patient follow up with Physician: PRN     Progress Report/POC: NO  POC update due: (10 visits /OR AUTH LIMITS, whichever is less)  2025                                             Medical History:  Comorbidities:  Hypertension  Stroke/TIA  Relevant Medical History: na                                         Precautions/ Contra-indications:           Latex allergy:  NO  Pacemaker:    NO  Contraindications for Manipulation: None  Date of Surgery: 25  Other:    Red Flags:  None    Suicide Screening:   The patient did not verbalize a primary behavioral concern, suicidal ideation, suicidal intent, or demonstrate suicidal behaviors.    Preferred Language for Healthcare:   [x] English       [] other:    SUBJECTIVE EXAMINATION     Patient stated complaint: Feeling good this morning. Thinking about performing aquatic visit 1x/wk and land 1x/wk.     From eval: Patient has history of LBP and did aquatic ttherapy last year with some success but ultimately could not reduce the pain/symptoms to an acceptable level.  Patient had surgery 2025. Procedure(s):

## 2025-05-06 ENCOUNTER — HOSPITAL ENCOUNTER (OUTPATIENT)
Dept: PHYSICAL THERAPY | Age: 75
Setting detail: THERAPIES SERIES
Discharge: HOME | End: 2025-05-06
Payer: MEDICARE

## 2025-05-06 PROCEDURE — 97110 THERAPEUTIC EXERCISES: CPT

## 2025-05-06 PROCEDURE — 97112 NEUROMUSCULAR REEDUCATION: CPT

## 2025-05-06 NOTE — FLOWSHEET NOTE
Met: [] Adjusted  Patient will improve 30 sec STS to 10 reps to demonstrate improvement in LE muscle endurance.  [x] Progressing: [] Met: [] Not Met: [] Adjusted  Pt will improve TUG to <12 seconds to demonstrate improved gait speed and reduce risk for falls.  [x] Progressing: [] Met: [] Not Met: [] Adjusted  Patient will improve 6 Min Walk Test to 1200ft with no AD to demonstrate improved endurance for community ambulation.  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   [x] Progressing: [] Met: [] Not Met: [] Adjusted       Overall Progression Towards Functional goals/ Treatment Progress Update:  [] Patient is progressing as expected towards functional goals listed.    [x] Progression is slowed due to complexities/Impairments listed.  [] Progression has been slowed due to co-morbidities.  [] Plan just implemented, too soon (<30days) to assess goals progression   [] Goals require adjustment due to lack of progress  [] Patient is not progressing as expected and requires additional follow up with physician  [] Other:     TREATMENT PLAN     Frequency/Duration: 2x/week for 8 weeks for the following treatment interventions:    Interventions:  Therapeutic Exercise (34527) including: strength training, ROM, and functional mobility  Therapeutic Activities (56117) including: functional mobility training and education.  Neuromuscular Re-education (59068) activation and proprioception, including postural re-education.    Gait Training (88929) for normalization of ambulation patterns and AD training.   Manual Therapy (72757) as indicated to include: Passive Range of Motion and Soft Tissue Mobilization  Patient education on postural re-education and progression of HEP  Aquatic Therapy

## 2025-05-08 ENCOUNTER — HOSPITAL ENCOUNTER (OUTPATIENT)
Dept: PHYSICAL THERAPY | Age: 75
Setting detail: THERAPIES SERIES
Discharge: HOME | End: 2025-05-08
Payer: MEDICARE

## 2025-05-08 PROCEDURE — 97150 GROUP THERAPEUTIC PROCEDURES: CPT

## 2025-05-08 PROCEDURE — 97113 AQUATIC THERAPY/EXERCISES: CPT

## 2025-05-08 NOTE — FLOWSHEET NOTE
[facility] - Outpatient Rehabilitation and Therapy: [address] [place] [address] office: [phone] fax: [phone]       Physical Therapy: TREATMENT/PROGRESS NOTE   Patient: Elfego Rodney Jr (74 y.o. male)   Examination Date: 2025   :  1950 MRN: [id number]   Visit #: 23,   Approval dates:25 - 25  Approved visits:8 PT ADDL   Insurance Allowable Auth Needed   mn [x]Yes    []No    Insurance: Payor: [facility] MEDICARE / Plan: [facility] MEDICARE COMPLETE / Product Type: *No Product type* /   Insurance ID: [id number] - (Medicare Managed)  Secondary Insurance (if applicable):    Treatment Diagnosis:     ICD-10-CM    1. Chronic bilateral low back pain with left-sided sciatica  M54.42     G89.29          Medical Diagnosis:  Radiculopathy, lumbar region [M54.16]   Referring Physician: Sara West MD  PCP: Rashaad Huber MD     Plan of care signed (Y/N): NO    Date of Patient follow up with Physician: PRN     Progress Report/POC: NO  POC update due: (10 visits /OR AUTH LIMITS, whichever is less)  2025                                             Medical History:  Comorbidities:  Hypertension  Stroke/TIA  Relevant Medical History: na                                         Precautions/ Contra-indications:           Latex allergy:  NO  Pacemaker:    NO  Contraindications for Manipulation: None  Date of Surgery: 25  Other:    Red Flags:  None    Suicide Screening:   The patient did not verbalize a primary behavioral concern, suicidal ideation, suicidal intent, or demonstrate suicidal behaviors.    Preferred Language for Healthcare:   [x] English       [] other:    SUBJECTIVE EXAMINATION     Patient stated complaint: Reports he really enjoyed land therapy last visit. Has been doing more so he is having more muscle soreness in low back today.     From eval: Patient has history of LBP and did aquatic ttherapy last year with some success but ultimately could not

## 2025-05-13 ENCOUNTER — HOSPITAL ENCOUNTER (OUTPATIENT)
Dept: PHYSICAL THERAPY | Age: 75
Setting detail: THERAPIES SERIES
Discharge: HOME | End: 2025-05-13
Payer: MEDICARE

## 2025-05-13 PROCEDURE — 97113 AQUATIC THERAPY/EXERCISES: CPT

## 2025-05-13 PROCEDURE — 97150 GROUP THERAPEUTIC PROCEDURES: CPT

## 2025-05-13 NOTE — FLOWSHEET NOTE
[facility] - Outpatient Rehabilitation and Therapy: [address] [place] [address] office: [phone] fax: [phone]       Physical Therapy: TREATMENT/PROGRESS NOTE   Patient: Elfego Rodney Jr (74 y.o. male)   Examination Date: 2025   :  1950 MRN: [id number]   Visit #: 14,   Approval dates:25 - 25  Approved visits:8 PT ADDL   Insurance Allowable Auth Needed   mn [x]Yes    []No    Insurance: Payor: [facility] MEDICARE / Plan: [facility] MEDICARE COMPLETE / Product Type: *No Product type* /   Insurance ID: [id number] - (Medicare Managed)  Secondary Insurance (if applicable):    Treatment Diagnosis:     ICD-10-CM    1. Chronic bilateral low back pain with left-sided sciatica  M54.42     G89.29          Medical Diagnosis:  Radiculopathy, lumbar region [M54.16]   Referring Physician: Sara West MD  PCP: Rashaad Huber MD     Plan of care signed (Y/N): NO    Date of Patient follow up with Physician: PRN     Progress Report/POC: NO  POC update due: (10 visits /OR AUTH LIMITS, whichever is less)  2025                                             Medical History:  Comorbidities:  Hypertension  Stroke/TIA  Relevant Medical History: na                                         Precautions/ Contra-indications:           Latex allergy:  NO  Pacemaker:    NO  Contraindications for Manipulation: None  Date of Surgery: 25  Other:    Red Flags:  None    Suicide Screening:   The patient did not verbalize a primary behavioral concern, suicidal ideation, suicidal intent, or demonstrate suicidal behaviors.    Preferred Language for Healthcare:   [x] English       [] other:    SUBJECTIVE EXAMINATION     Patient stated complaint: Felt sore in low back after last aquatic therapy from his R HS cramping.        From eval: Patient has history of LBP and did aquatic ttherapy last year with some success but ultimately could not reduce the pain/symptoms to an acceptable level.

## 2025-05-14 ENCOUNTER — HOSPITAL ENCOUNTER (OUTPATIENT)
Dept: PHYSICAL THERAPY | Age: 75
Setting detail: THERAPIES SERIES
Discharge: HOME | End: 2025-05-14
Payer: MEDICARE

## 2025-05-14 PROCEDURE — 97110 THERAPEUTIC EXERCISES: CPT

## 2025-05-14 NOTE — FLOWSHEET NOTE
[facility] - Outpatient Rehabilitation and Therapy: [address] [place] [address] office: [phone] fax: [phone]       Physical Therapy: TREATMENT/PROGRESS NOTE   Patient: Elfego Rodney Jr (74 y.o. male)   Examination Date: 2025   :  1950 MRN: [id number]   Visit #: 23,   Approval dates:25 - 25  Approved visits:8 PT ADDL   Insurance Allowable Auth Needed   mn [x]Yes    []No    Insurance: Payor: [facility] MEDICARE / Plan: [facility] MEDICARE COMPLETE / Product Type: *No Product type* /   Insurance ID: [id number] - (Medicare Managed)  Secondary Insurance (if applicable):    Treatment Diagnosis:     ICD-10-CM    1. Chronic bilateral low back pain with left-sided sciatica  M54.42     G89.29          Medical Diagnosis:  Radiculopathy, lumbar region [M54.16]   Referring Physician: Sara West MD  PCP: Rashaad Huber MD     Plan of care signed (Y/N): NO    Date of Patient follow up with Physician: PRN     Progress Report/POC: NO  POC update due: (10 visits /OR AUTH LIMITS, whichever is less)  2025                                             Medical History:  Comorbidities:  Hypertension  Stroke/TIA  Relevant Medical History: na                                         Precautions/ Contra-indications:           Latex allergy:  NO  Pacemaker:    NO  Contraindications for Manipulation: None  Date of Surgery: 25  Other:    Red Flags:  None    Suicide Screening:   The patient did not verbalize a primary behavioral concern, suicidal ideation, suicidal intent, or demonstrate suicidal behaviors.    Preferred Language for Healthcare:   [x] English       [] other:    SUBJECTIVE EXAMINATION     Patient stated complaint: Felt good after aquatic therapy yesterday. Having some soreness in his low back, attributed to post-op pain. States that he is still unable to ascend with LLE up steps. Still using a SPC when walking dog in grass.       From eval: Patient has history

## 2025-05-20 ENCOUNTER — HOSPITAL ENCOUNTER (OUTPATIENT)
Dept: PHYSICAL THERAPY | Age: 75
Setting detail: THERAPIES SERIES
Discharge: HOME | End: 2025-05-20
Payer: MEDICARE

## 2025-05-20 PROCEDURE — 97150 GROUP THERAPEUTIC PROCEDURES: CPT

## 2025-05-20 PROCEDURE — 97113 AQUATIC THERAPY/EXERCISES: CPT

## 2025-05-20 NOTE — FLOWSHEET NOTE
indicated to include: Passive Range of Motion and Soft Tissue Mobilization  Patient education on postural re-education and progression of HEP  Aquatic Therapy (66069)    Plan: Continue aquatic therapy for additional month    Electronically Signed by Fidel Hitchcock PT  Date: 05/20/2025      Note: Portions of this note have been templated and/or copied from initial evaluation, reassessments and prior notes for documentation efficiency.      Note: If patient does not return for scheduled/recommended follow up visits, this note will serve as a discharge from care along with the most recent update on progress.

## 2025-05-21 ENCOUNTER — HOSPITAL ENCOUNTER (OUTPATIENT)
Dept: PHYSICAL THERAPY | Age: 75
Setting detail: THERAPIES SERIES
Discharge: HOME | End: 2025-05-21
Payer: MEDICARE

## 2025-05-21 PROCEDURE — 97530 THERAPEUTIC ACTIVITIES: CPT

## 2025-05-21 NOTE — PLAN OF CARE
BUE support          Therapeutic Activity (71596)       PN to assesss TUG, 5xSTS, 30 sec STS, standing balance, discussed goals, and POC  40 min                   Manual Intervention (39490) Time:                 Gait Training (60146)       Modalities:    No modalities applied this session    Education/Home Exercise Program: Patient HEP program created electronically.  Refer to [employer] access code: [email or web address]    Aquatic Exercises/Interventions   5/8/25, 5/13/25, 5/20/25    Aquatic Exercise Sets / Reps / Time  Aquatic Exercise Sets / Reps / Time   Warm-up / Ambulation  LE Exercise    Forward x2laps   HR/TR x25    Sideways (lateral) x2laps   Marching x3min    Backwards x2laps   Mini-squats x25B    Jog    3-way SLR x25B    Braiding    Hip Circles x25B    Bicycling x3min   Hamstring Curls x25B    Corner hang x2min   Knee Extension x25B   Functional   Pelvic tilts 5sec x10    Steps x10B        Hand to Opp. knee 10x5\" B        Wall push-ups   UE Exercises        Scap Squeezes 5sec x10        Shld Flex/Ext x25B w/ closed P   Balance / Proprioception   Shld Abd/Add x25B w/ closed P    SLS    Shld Horz: Abd/Add x25B w/ closedP       Tandem Stance [id number]   Shld IR/ER         Rowing    Stretching   Arm Circles     Gastroc/Soleus [id number]   Chin Tucks     Hamstring 4s44mre   UT Shrugs/Rolls     SKTC    Push Downs     Piriformis    Punching     Hip flexor    Wall Pushups     Pec Stretch    PNF Diagonals     Post Deltoid            Other                       Aquatic Abbreviation Key    B = Belt   DB = Dumbells   T = Theratube     H = Hydrotone   N = Noodles   W = Weights     P = Paddles   S = Speedo equipment   K = Kickboard     ASSESSMENT     Today's Assessment: POC/PROGRESS UPDATE: Pt. continues to present with functional deficits in strength symmetry and eccentric control  limiting ability with walking on uneven ground, managing community ambulation, walking up/down stairs, and lifting items .  During therapy this date,

## 2025-05-27 ENCOUNTER — HOSPITAL ENCOUNTER (OUTPATIENT)
Dept: PHYSICAL THERAPY | Age: 75
Setting detail: THERAPIES SERIES
Discharge: HOME | End: 2025-05-27
Payer: MEDICARE

## 2025-05-27 PROCEDURE — 97113 AQUATIC THERAPY/EXERCISES: CPT

## 2025-05-27 PROCEDURE — 97150 GROUP THERAPEUTIC PROCEDURES: CPT

## 2025-05-27 NOTE — FLOWSHEET NOTE
[facility] - Outpatient Rehabilitation and Therapy: [address] [place] [address] office: [phone] fax: [phone]         Physical Therapy: TREATMENT/PROGRESS NOTE   Patient: Elfego Rodney Jr (74 y.o. male)   Examination Date: 2025   :  1950 MRN: [id number]   Visit #: 17,   Approval dates:25 - 25  Approved visits:8 PT ADDL   Insurance Allowable Auth Needed   mn [x]Yes    []No    Insurance: Payor: [facility] MEDICARE / Plan: [facility] MEDICARE COMPLETE / Product Type: *No Product type* /   Insurance ID: [id number] - (Medicare Managed)  Secondary Insurance (if applicable):    Treatment Diagnosis:     ICD-10-CM    1. Chronic bilateral low back pain with left-sided sciatica  M54.42     G89.29          Medical Diagnosis:  Radiculopathy, lumbar region [M54.16]   Referring Physician: Sara West MD  PCP: Rashaad Huber MD     Plan of care signed (Y/N): NO    Date of Patient follow up with Physician: PRN     Progress Report/POC: NO  POC update due: (10 visits /OR AUTH LIMITS, whichever is less)  2025                                             Medical History:  Comorbidities:  Hypertension  Stroke/TIA  Relevant Medical History: na                                         Precautions/ Contra-indications:           Latex allergy:  NO  Pacemaker:    NO  Contraindications for Manipulation: None  Date of Surgery: 25  Other:    Red Flags:  None    Suicide Screening:   The patient did not verbalize a primary behavioral concern, suicidal ideation, suicidal intent, or demonstrate suicidal behaviors.    Preferred Language for Healthcare:   [x] English       [] other:    SUBJECTIVE EXAMINATION     Patient stated complaint: Pt reports walking more on flat ground but continues to c/o unsteadiness on uneven surfaces. Post-surgical nerve pain much improved.     From eval: Patient has history of LBP and did aquatic ttherapy last year with some success but ultimately could

## 2025-05-29 ENCOUNTER — HOSPITAL ENCOUNTER (OUTPATIENT)
Dept: PHYSICAL THERAPY | Age: 75
Setting detail: THERAPIES SERIES
Discharge: HOME | End: 2025-05-29
Payer: MEDICARE

## 2025-05-29 PROCEDURE — 97110 THERAPEUTIC EXERCISES: CPT

## 2025-05-29 PROCEDURE — 97112 NEUROMUSCULAR REEDUCATION: CPT

## 2025-05-29 NOTE — FLOWSHEET NOTE
Adjusted  2. Patient will have a decrease in pain to <3/10 to facilitate improvement in movement, function, and ADLs as indicated by Functional Deficits.  [] Progressing: [x] Met: [] Not Met: [] Adjusted    Long Term Goals: To be achieved in: 8 weeks  Disability index score of 15% or less for the Modified Oswestry to assist with reaching prior level of function.  [x] Progressing: [] Met: [] Not Met: [] Adjusted  Patient will improve 30 sec STS to 10 reps to demonstrate improvement in LE muscle endurance.  [] Progressing: [x] Met: [] Not Met: [] Adjusted  Pt will improve TUG to <12 seconds to demonstrate improved gait speed and reduce risk for falls.  [x] Progressing: [] Met: [] Not Met: [] Adjusted  Patient will improve 6 Min Walk Test to 1200ft with no AD to demonstrate improved endurance for community ambulation.  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   [x] Progressing: [] Met: [] Not Met: [] Adjusted  Patient will be able to balance in tandem stance for 15 sec in order to demonstrate improved standing balance.   [] Progressing: [] Met: [] Not Met: [] Adjusted (added 5/21/25)  Patient will be able to safely ambulate in grass with LRAD in order to demonstrate improve balance.   [] Progressing: [] Met: [] Not Met: [] Adjusted (added 5/21/25)       Overall Progression Towards Functional goals/ Treatment Progress Update:  [] Patient is progressing as expected towards functional goals listed.    [x] Progression is slowed due to complexities/Impairments listed.  [] Progression has been slowed due to co-morbidities.  [] Plan just implemented, too soon (<30days) to assess goals progression   [] Goals require adjustment due to lack of progress  [] Patient is not progressing

## 2025-06-10 ENCOUNTER — HOSPITAL ENCOUNTER (OUTPATIENT)
Dept: PHYSICAL THERAPY | Age: 75
Setting detail: THERAPIES SERIES
Discharge: HOME | End: 2025-06-10
Payer: MEDICARE

## 2025-06-10 PROCEDURE — 97112 NEUROMUSCULAR REEDUCATION: CPT

## 2025-06-10 PROCEDURE — 97110 THERAPEUTIC EXERCISES: CPT

## 2025-06-10 NOTE — FLOWSHEET NOTE
[facility] - Outpatient Rehabilitation and Therapy: [address] [place] [address] office: [phone] fax: [phone]         Physical Therapy: TREATMENT/PROGRESS NOTE   Patient: Elfego Rodney Jr (74 y.o. male)   Examination Date: 06/10/2025   :  1950 MRN: [id number]   Visit #: 19, ,   Approval dates:25 - 25  Approved visits:4 PT ADDL   Approval dates:25 - 25  Approved visits:8 PT ADDL   Insurance Allowable Auth Needed   MN [x]Yes    []No    Insurance: Payor: [facility] MEDICARE / Plan: [facility] MEDICARE COMPLETE / Product Type: *No Product type* /   Insurance ID: [id number] - (Medicare Managed)  Secondary Insurance (if applicable):    Treatment Diagnosis:     ICD-10-CM    1. Chronic bilateral low back pain with left-sided sciatica  M54.42     G89.29          Medical Diagnosis:  Radiculopathy, lumbar region [M54.16]   Referring Physician: Sara West MD  PCP: Rashaad Huber MD     Plan of care signed (Y/N): Y    Date of Patient follow up with Physician: PRN     Progress Report/POC: NO  POC update due: (10 visits /OR AUTH LIMITS, whichever is less)  2025                                             Medical History:  Comorbidities:  Hypertension  Stroke/TIA  Relevant Medical History: na                                         Precautions/ Contra-indications:           Latex allergy:  NO  Pacemaker:    NO  Contraindications for Manipulation: None  Date of Surgery: 25  Other:    Red Flags:  None    Suicide Screening:   The patient did not verbalize a primary behavioral concern, suicidal ideation, suicidal intent, or demonstrate suicidal behaviors.    Preferred Language for Healthcare:   [x] English       [] other:    SUBJECTIVE EXAMINATION     Patient stated complaint: Did a lot of walking yesterday, but feeling good. Back is doing well, still feel that he needs to work on his balance. Feels that his R ankle wants to roll out when walking, especially

## 2025-06-12 ENCOUNTER — HOSPITAL ENCOUNTER (OUTPATIENT)
Dept: PHYSICAL THERAPY | Age: 75
Setting detail: THERAPIES SERIES
Discharge: HOME | End: 2025-06-12
Payer: MEDICARE

## 2025-06-12 PROCEDURE — 97112 NEUROMUSCULAR REEDUCATION: CPT

## 2025-06-12 PROCEDURE — 97110 THERAPEUTIC EXERCISES: CPT

## 2025-06-12 NOTE — FLOWSHEET NOTE
listed.  [] Progression has been slowed due to co-morbidities.  [] Plan just implemented, too soon (<30days) to assess goals progression   [] Goals require adjustment due to lack of progress  [] Patient is not progressing as expected and requires additional follow up with physician  [] Other:     TREATMENT PLAN     Frequency/Duration: 2x/week for 8 weeks for the following treatment interventions:    Interventions:  Therapeutic Exercise (97527) including: strength training, ROM, and functional mobility  Therapeutic Activities (84195) including: functional mobility training and education.  Neuromuscular Re-education (94082) activation and proprioception, including postural re-education.    Gait Training (71458) for normalization of ambulation patterns and AD training.   Manual Therapy (54118) as indicated to include: Passive Range of Motion and Soft Tissue Mobilization  Patient education on postural re-education and progression of HEP  Aquatic Therapy (51762)    Plan: Continue aquatic therapy for additional month    Electronically Signed by Aidee Cerna PT  Date: 06/12/2025      Note: Portions of this note have been templated and/or copied from initial evaluation, reassessments and prior notes for documentation efficiency.      Note: If patient does not return for scheduled/recommended follow up visits, this note will serve as a discharge from care along with the most recent update on progress.

## 2025-06-16 ENCOUNTER — HOSPITAL ENCOUNTER (OUTPATIENT)
Dept: PHYSICAL THERAPY | Age: 75
Setting detail: THERAPIES SERIES
Discharge: HOME | End: 2025-06-16
Payer: MEDICARE

## 2025-06-16 PROCEDURE — 97112 NEUROMUSCULAR REEDUCATION: CPT

## 2025-06-16 PROCEDURE — 97110 THERAPEUTIC EXERCISES: CPT

## 2025-06-16 NOTE — FLOWSHEET NOTE
[facility] - Outpatient Rehabilitation and Therapy: [address] [place] [address] office: [phone] fax: [phone]         Physical Therapy: TREATMENT/PROGRESS NOTE   Patient: Elfego Rodney Jr (74 y.o. male)   Examination Date: 2025   :  1950 MRN: [id number]   Visit #: 20,    Approval dates:25 - 25  Approved visits:4 PT ADDL   Approval dates:25 - 25  Approved visits:8 PT ADDL   Insurance Allowable Auth Needed   MN [x]Yes    []No    Insurance: Payor: [facility] MEDICARE / Plan: [facility] MEDICARE COMPLETE / Product Type: *No Product type* /   Insurance ID: [id number] - (Medicare Managed)  Secondary Insurance (if applicable):    Treatment Diagnosis:     ICD-10-CM    1. Chronic bilateral low back pain with left-sided sciatica  M54.42     G89.29          Medical Diagnosis:  Radiculopathy, lumbar region [M54.16]   Referring Physician: Sara West MD  PCP: Rashaad Huber MD     Plan of care signed (Y/N): Y    Date of Patient follow up with Physician: PRN     Progress Report/POC: NO  POC update due: (10 visits /OR AUTH LIMITS, whichever is less)  2025                                             Medical History:  Comorbidities:  Hypertension  Stroke/TIA  Relevant Medical History: na                                         Precautions/ Contra-indications:           Latex allergy:  NO  Pacemaker:    NO  Contraindications for Manipulation: None  Date of Surgery: 25  Other:    Red Flags:  None    Suicide Screening:   The patient did not verbalize a primary behavioral concern, suicidal ideation, suicidal intent, or demonstrate suicidal behaviors.    Preferred Language for Healthcare:   [x] English       [] other:    SUBJECTIVE EXAMINATION     Patient stated complaint: Was more tired yesterday, so did not do as much. Having a bit of R knee pain this morning. Has been working on trying to push down with big toe when walking in grass at home, feels that it

## 2025-06-19 ENCOUNTER — HOSPITAL ENCOUNTER (OUTPATIENT)
Dept: PHYSICAL THERAPY | Age: 75
Setting detail: THERAPIES SERIES
Discharge: HOME | End: 2025-06-19
Payer: MEDICARE

## 2025-06-19 PROCEDURE — 97110 THERAPEUTIC EXERCISES: CPT

## 2025-06-19 PROCEDURE — 97112 NEUROMUSCULAR REEDUCATION: CPT

## 2025-06-19 NOTE — FLOWSHEET NOTE
[facility] - Outpatient Rehabilitation and Therapy: [address] [place] [address] office: [phone] fax: [phone]         Physical Therapy: TREATMENT/PROGRESS NOTE   Patient: Elfego Rodney Jr (74 y.o. male)   Examination Date: 2025   :  1950 MRN: [id number]   Visit #: 21,    Approval dates:25 - 25  Approved visits:4 PT ADDL   Approval dates:25 - 25  Approved visits:8 PT ADDL   Insurance Allowable Auth Needed   MN [x]Yes    []No    Insurance: Payor: [facility] MEDICARE / Plan: [facility] MEDICARE COMPLETE / Product Type: *No Product type* /   Insurance ID: [id number] - (Medicare Managed)  Secondary Insurance (if applicable):    Treatment Diagnosis:     ICD-10-CM    1. Chronic bilateral low back pain with left-sided sciatica  M54.42     G89.29          Medical Diagnosis:  Radiculopathy, lumbar region [M54.16]   Referring Physician: Sara West MD  PCP: Rashaad Huber MD     Plan of care signed (Y/N): Y    Date of Patient follow up with Physician: PRN     Progress Report/POC: NO  POC update due: (10 visits /OR AUTH LIMITS, whichever is less)  2025                                             Medical History:  Comorbidities:  Hypertension  Stroke/TIA  Relevant Medical History: na                                         Precautions/ Contra-indications:           Latex allergy:  NO  Pacemaker:    NO  Contraindications for Manipulation: None  Date of Surgery: 25  Other:    Red Flags:  None    Suicide Screening:   The patient did not verbalize a primary behavioral concern, suicidal ideation, suicidal intent, or demonstrate suicidal behaviors.    Preferred Language for Healthcare:   [x] English       [] other:    SUBJECTIVE EXAMINATION     Patient stated complaint: Doing well today. Continues to feel unsteady on R foot in the grass.     From eval: Patient has history of LBP and did aquatic ttherapy last year with some success but ultimately could not

## 2025-06-23 ENCOUNTER — HOSPITAL ENCOUNTER (OUTPATIENT)
Dept: PHYSICAL THERAPY | Age: 75
Setting detail: THERAPIES SERIES
End: 2025-06-23
Payer: MEDICARE

## 2025-06-26 ENCOUNTER — HOSPITAL ENCOUNTER (OUTPATIENT)
Dept: PHYSICAL THERAPY | Age: 75
Setting detail: THERAPIES SERIES
Discharge: HOME | End: 2025-06-26
Payer: MEDICARE

## 2025-06-26 PROCEDURE — 97112 NEUROMUSCULAR REEDUCATION: CPT

## 2025-06-26 PROCEDURE — 97110 THERAPEUTIC EXERCISES: CPT

## 2025-06-26 PROCEDURE — 97116 GAIT TRAINING THERAPY: CPT

## 2025-06-26 NOTE — THERAPY DISCHARGE
[facility] - Outpatient Rehabilitation and Therapy: [address] [place] [address] office: [phone] fax: [phone]    Physical Therapy Re-Certification Plan of Care    Dear Sara West MD  ,    We had the pleasure of treating the following patient for physical therapy services at [facility] Outpatient Physical Therapy. A summary of our findings can be found in the updated assessment below.  This includes our plan of care.  If you have any questions or concerns regarding these findings, please do not hesitate to contact me at the office phone number checked above.  Thank you for the referral.     Physician Signature:________________________________Date:__________________  By signing above (or electronic signature), therapist's plan is approved by physician      Total Visits: 25     Overall Response to Treatment:  Patient is responding well to treatment and improvement is noted with regards to goals and Patient should continue to improve in reasonable time if they continue HEP    Recommendation:    [] Continue PT x / wk for  weeks.   [] Hold PT, pending MD visit   [x] Discharge to [facility]. Follow up with PT or MD PRN.   Duration:   Weeks: 16    Discharge Demographics:  Specialty/SubSpecialty: MSK: Spine: Lumbar  Certs/Equipment/Programs: NA  Surgical status: Surgical  Number of Comorbidities: 3+    Physical Therapy: TREATMENT/PROGRESS NOTE   Patient: Elfego Rodney Jr (74 y.o. male)   Examination Date: 2025   :  1950 MRN: [id number]   Visit #: 25,      Approval dates:25 - 25  Approved visits:4 PT ADDL   Approval dates:25 - 25  Approved visits:8 PT ADDL   Insurance Allowable Auth Needed   MN [x]Yes    []No    Insurance: Payor: [facility] MEDICARE / Plan: [facility] MEDICARE COMPLETE / Product Type: *No Product type* /   Insurance ID: [id number] - (Medicare Managed)  Secondary Insurance (if applicable):    Treatment Diagnosis:     ICD-10-CM    1. Chronic

## 2025-07-01 ENCOUNTER — HOSPITAL ENCOUNTER (OUTPATIENT)
Dept: PHYSICAL THERAPY | Age: 75
Setting detail: THERAPIES SERIES
Discharge: HOME | End: 2025-07-01
Payer: MEDICARE

## 2025-07-01 ENCOUNTER — APPOINTMENT (OUTPATIENT)
Dept: PHYSICAL THERAPY | Age: 75
End: 2025-07-01
Payer: MEDICARE

## 2025-07-01 DIAGNOSIS — R26.9 GAIT DIFFICULTY: Primary | ICD-10-CM

## 2025-07-01 DIAGNOSIS — R26.89 IMPAIRMENT OF BALANCE: ICD-10-CM

## 2025-07-01 PROCEDURE — 97161 PT EVAL LOW COMPLEX 20 MIN: CPT

## 2025-07-01 PROCEDURE — 97116 GAIT TRAINING THERAPY: CPT

## 2025-07-01 NOTE — PLAN OF CARE
[facility] - Outpatient Rehabilitation and Therapy: [address] [place] [address] office: [phone] fax: [phone]     Physical Therapy Initial Evaluation Certification      Dear Rashaad Huber MD,    We had the pleasure of evaluating the following patient for physical therapy services at [facility].  A summary of our findings can be found in the initial assessment below.  This includes our plan of care.  If you have any questions or concerns regarding these findings, please do not hesitate to contact me at the office phone number listed above.  Thank you for the referral.     Physician Signature:_______________________________Date:__________________  By signing above (or electronic signature), therapist’s plan is approved by physician       Physical Therapy: TREATMENT/PROGRESS NOTE   Patient: Elfego Rodney Jr (74 y.o. male)   Examination Date: 2025   :  1950 MRN: [id number]   Visit #: 1   Insurance Allowable Auth Needed   MN [x]Yes    []No    Insurance: Payor: [facility] MEDICARE / Plan: [facility] MEDICARE COMPLETE / Product Type: *No Product type* /   Insurance ID: [id number] - (Medicare Managed)  Secondary Insurance (if applicable):    Treatment Diagnosis:     ICD-10-CM    1. Gait difficulty  R26.9       2. Impairment of balance  R26.89          Medical Diagnosis:  Type 2 diabetes mellitus with diabetic peripheral angiopathy without gangrene (HCC) [E11.51]  Ataxia, unspecified [R27.0]   Referring Physician: Rashaad Huber MD  PCP: Rashaad Huber MD   Plan of care signed (Y/N): N    Date of Patient follow up with Physician: PRN     Plan of Care Report: EVAL today  POC update due: (10 visits /OR AUTH LIMITS, whichever is less)  2025                                             Medical History:  Comorbidities:  Cancer- skin  Diabetes (Type I or II)  Hypertension  TIA  Atrial Fibrillation  Relevant Medical History: see chart

## 2025-07-10 ENCOUNTER — HOSPITAL ENCOUNTER (OUTPATIENT)
Dept: PHYSICAL THERAPY | Age: 75
Setting detail: THERAPIES SERIES
Discharge: HOME | End: 2025-07-10
Payer: MEDICARE

## 2025-07-10 PROCEDURE — 97110 THERAPEUTIC EXERCISES: CPT

## 2025-07-10 PROCEDURE — 97112 NEUROMUSCULAR REEDUCATION: CPT

## 2025-07-10 NOTE — FLOWSHEET NOTE
foraminotomies on 1/27/25, earlier this year to alleviate sciatic and back pain. However, Pt continues to have difficulty with walking and balance. Completed physical therapy post-op to improve strength, but would like to work on his balance. Feels weaker in left leg. Difficulty with walking on grass/uneven surfaces and steps.        Test used Initial score  7/1/25 07/10/2025   Pain Summary VAS 0/10 0/10   Functional questionnaire LEFS 45/80:   56.25% function,   34.75% impairment     Other:              OBJECTIVE EXAMINATION     Observations:  Posture: forward head and rounded shoulders    Strength/ROM    [x]All ROM WNL except as marked below    [x]All strength measured on 5 point scale    [x]UE ROM/strength deferred to OT    \"*\" Indicates pain with movement      Right  Left      PROM AROM MMT Notes PROM AROM MMT Notes     HIP Flexion   4+    4+     Extension   4+    4+     Abduction   4+    4+     Adduction   4+    4+     External Rotation            Internal Rotation           KNEE Flexion   5    5     Extension   5    5      ANKLE Dorsiflexion   5    5     Plantarflexion   5    5     Inversion   3+    3+     Eversion   3    3+      Functional Mobility/Balance:     Score Comments   Timed Up and Go (TUG)  12 seconds  <20 = mostly independent Device used: no AD  Gait, decreased step length, R foot inversion/drop,    Sit to Stand 30 second test 9      Time to complete 5 reps 16.10 seconds    Juarez Balance Scale  46    41-56 = low risk for falls   HiMAT  NPV    6MWT  978 ft     Functional Gait Assessment   19 Age norms for 70-79: 24.9 pts    Standing Balance  NBOS EO 30 sec      NBOS EC  30 sec      Tandem (RLE behind)  10 sec      Tandem (LLE behind)  30 sec      SLS (RLE) 2 sec      SLS (LLE) 6 sec       Falls Risk Assessment (30 days): Falls Risk assessed and patient requires intervention due to being higher risk     Exercises/Interventions     Therapeutic Ex (37371)  Resistance Sets/time Reps

## 2025-07-14 ENCOUNTER — HOSPITAL ENCOUNTER (OUTPATIENT)
Dept: PHYSICAL THERAPY | Age: 75
Setting detail: THERAPIES SERIES
Discharge: HOME | End: 2025-07-14
Payer: MEDICARE

## 2025-07-14 PROCEDURE — 97110 THERAPEUTIC EXERCISES: CPT

## 2025-07-14 NOTE — FLOWSHEET NOTE
[facility] - Outpatient Rehabilitation and Therapy: [address] [place] [address] office: [phone] fax: [phone]    Physical Therapy: TREATMENT/PROGRESS NOTE   Patient: Elfego Rodney Jr (75 y.o. male)   Examination Date: 2025   :  1950 MRN: [id number]   Visit #: 3   Insurance Allowable Auth Needed   MN [x]Yes    []No    Insurance: Payor: [facility] MEDICARE / Plan: [facility] MEDICARE COMPLETE / Product Type: *No Product type* /   Insurance ID: [id number] - (Medicare Managed)  Secondary Insurance (if applicable):    Treatment Diagnosis:     ICD-10-CM    1. Gait difficulty  R26.9       2. Impairment of balance  R26.89          Medical Diagnosis:  Type 2 diabetes mellitus with diabetic peripheral angiopathy without gangrene (HCC) [E11.51]  Ataxia, unspecified [R27.0]   Referring Physician: Rashaad Huber MD  PCP: Rashaad Huber MD   Plan of care signed (Y/N): Y    Date of Patient follow up with Physician: PRN     Plan of Care Report: NO  POC update due: (10 visits /OR AUTH LIMITS, whichever is less)  2025                                             Medical History:  Comorbidities:  Cancer- skin  Diabetes (Type I or II)  Hypertension  TIA  Atrial Fibrillation  Relevant Medical History: see chart                                          Precautions/ Contra-indications:           Latex allergy:  NO  Pacemaker:    NO  Contraindications for Manipulation: NA    Red Flags:  None    Suicide Screening:   The patient did not verbalize a primary behavioral concern, suicidal ideation, suicidal intent, or demonstrate suicidal behaviors.    Preferred Language for Healthcare:   [x] English       [] other:    SUBJECTIVE EXAMINATION     Patient stated complaint/comment: Had a busy weekend, but did a lot of walking.     From eval  25: Patient states he had R L3-5 minimally invasive laminectomy bilateral lateral recess decompression, foraminotomies on 25, earlier this year to

## 2025-07-16 ENCOUNTER — HOSPITAL ENCOUNTER (OUTPATIENT)
Dept: PHYSICAL THERAPY | Age: 75
Setting detail: THERAPIES SERIES
Discharge: HOME | End: 2025-07-16
Payer: MEDICARE

## 2025-07-16 PROCEDURE — 97110 THERAPEUTIC EXERCISES: CPT

## 2025-07-16 NOTE — FLOWSHEET NOTE
[facility] - Outpatient Rehabilitation and Therapy: [address] [place] [address] [place] [address] office: [phone] fax: [phone]    Physical Therapy: TREATMENT/PROGRESS NOTE   Patient: Elfego Rodney Jr (75 y.o. male)   Examination Date: 2025   :  1950 MRN: [id number]   Visit #: 4   Insurance Allowable Auth Needed   MN [x]Yes    []No    Insurance: Payor: [facility] MEDICARE / Plan: [facility] MEDICARE COMPLETE / Product Type: *No Product type* /   Insurance ID: [id number] - (Medicare Managed)  Secondary Insurance (if applicable):    Treatment Diagnosis:     ICD-10-CM    1. Gait difficulty  R26.9       2. Impairment of balance  R26.89          Medical Diagnosis:  Type 2 diabetes mellitus with diabetic peripheral angiopathy without gangrene (HCC) [E11.51]  Ataxia, unspecified [R27.0]   Referring Physician: Rashaad Huber MD  PCP: Rashaad Huber MD   Plan of care signed (Y/N): Y    Date of Patient follow up with Physician: PRN     Plan of Care Report: NO  POC update due: (10 visits /OR AUTH LIMITS, whichever is less)  2025                                             Medical History:  Comorbidities:  Cancer- skin  Diabetes (Type I or II)  Hypertension  TIA  Atrial Fibrillation  Relevant Medical History: see chart                                          Precautions/ Contra-indications:           Latex allergy:  NO  Pacemaker:    NO  Contraindications for Manipulation: NA    Red Flags:  None    Suicide Screening:   The patient did not verbalize a primary behavioral concern, suicidal ideation, suicidal intent, or demonstrate suicidal behaviors.    Preferred Language for Healthcare:   [x] English       [] other:    SUBJECTIVE EXAMINATION     Patient stated complaint/comment: Reports feeling sore after last visit in hips and low back.     From eval  25: Patient states he had R L3-5 minimally invasive laminectomy bilateral lateral recess decompression, foraminotomies on 25, earlier

## 2025-07-22 ENCOUNTER — HOSPITAL ENCOUNTER (OUTPATIENT)
Dept: PHYSICAL THERAPY | Age: 75
Setting detail: THERAPIES SERIES
Discharge: HOME | End: 2025-07-22
Payer: MEDICARE

## 2025-07-22 PROCEDURE — 97112 NEUROMUSCULAR REEDUCATION: CPT

## 2025-07-22 PROCEDURE — 97116 GAIT TRAINING THERAPY: CPT

## 2025-07-22 PROCEDURE — 97110 THERAPEUTIC EXERCISES: CPT

## 2025-07-22 NOTE — FLOWSHEET NOTE
[facility] - Outpatient Rehabilitation and Therapy: [address] [place] [address] office: [phone] fax: [phone]    Physical Therapy: TREATMENT/PROGRESS NOTE   Patient: Elfego Rodney Jr (75 y.o. male)   Examination Date: 2025   :  1950 MRN: [id number]   Visit #: 5   Approval dates:25 - 25  Approved visits:16   Insurance Allowable Auth Needed   MN [x]Yes    []No    Insurance: Payor: [facility] MEDICARE / Plan: [facility] MEDICARE COMPLETE / Product Type: *No Product type* /   Insurance ID: [id number] - (Medicare Managed)  Secondary Insurance (if applicable):    Treatment Diagnosis:     ICD-10-CM    1. Gait difficulty  R26.9       2. Impairment of balance  R26.89          Medical Diagnosis:  Type 2 diabetes mellitus with diabetic peripheral angiopathy without gangrene (HCC) [E11.51]  Ataxia, unspecified [R27.0]   Referring Physician: Rashaad Huber MD  PCP: Rashaad Huber MD   Plan of care signed (Y/N): Y    Date of Patient follow up with Physician: PRN     Plan of Care Report: NO  POC update due: (10 visits /OR AUTH LIMITS, whichever is less)  2025                                             Medical History:  Comorbidities:  Cancer- skin  Diabetes (Type I or II)  Hypertension  TIA  Atrial Fibrillation  Relevant Medical History: see chart                                          Precautions/ Contra-indications:           Latex allergy:  NO  Pacemaker:    NO  Contraindications for Manipulation: NA    Red Flags:  None    Suicide Screening:   The patient did not verbalize a primary behavioral concern, suicidal ideation, suicidal intent, or demonstrate suicidal behaviors.    Preferred Language for Healthcare:   [x] English       [] other:    SUBJECTIVE EXAMINATION     Patient stated complaint/comment: Feels like he can walk better after therapy sessions.     From eval  25: Patient states he had R L3-5 minimally invasive laminectomy bilateral lateral recess

## 2025-07-24 ENCOUNTER — HOSPITAL ENCOUNTER (OUTPATIENT)
Dept: PHYSICAL THERAPY | Age: 75
Setting detail: THERAPIES SERIES
Discharge: HOME | End: 2025-07-24
Payer: MEDICARE

## 2025-07-24 PROCEDURE — 97110 THERAPEUTIC EXERCISES: CPT

## 2025-07-24 PROCEDURE — 97112 NEUROMUSCULAR REEDUCATION: CPT

## 2025-07-24 PROCEDURE — 97116 GAIT TRAINING THERAPY: CPT

## 2025-07-24 NOTE — FLOWSHEET NOTE
[facility] - Outpatient Rehabilitation and Therapy: [address] [place] [address] office: [phone] fax: [phone]    Physical Therapy: TREATMENT/PROGRESS NOTE   Patient: Elfego Rodney Jr (75 y.o. male)   Examination Date: 2025   :  1950 MRN: [id number]   Visit #: 6   Approval dates:25 - 25  Approved visits:16   Insurance Allowable Auth Needed   MN [x]Yes    []No    Insurance: Payor: [facility] MEDICARE / Plan: [facility] MEDICARE COMPLETE / Product Type: *No Product type* /   Insurance ID: [id number] - (Medicare Managed)  Secondary Insurance (if applicable):    Treatment Diagnosis:     ICD-10-CM    1. Gait difficulty  R26.9       2. Impairment of balance  R26.89          Medical Diagnosis:  Type 2 diabetes mellitus with diabetic peripheral angiopathy without gangrene (HCC) [E11.51]  Ataxia, unspecified [R27.0]   Referring Physician: Rashaad Huber MD  PCP: Rashaad Huber MD   Plan of care signed (Y/N): Y    Date of Patient follow up with Physician: PRN     Plan of Care Report: NO  POC update due: (10 visits /OR AUTH LIMITS, whichever is less)  2025                                             Medical History:  Comorbidities:  Cancer- skin  Diabetes (Type I or II)  Hypertension  TIA  Atrial Fibrillation  Relevant Medical History: see chart                                          Precautions/ Contra-indications:           Latex allergy:  NO  Pacemaker:    NO  Contraindications for Manipulation: NA    Red Flags:  None    Suicide Screening:   The patient did not verbalize a primary behavioral concern, suicidal ideation, suicidal intent, or demonstrate suicidal behaviors.    Preferred Language for Healthcare:   [x] English       [] other:    SUBJECTIVE EXAMINATION     Patient stated complaint/comment: Walked around [place] yesterday for 3 hours, had minimal back pain.     From eval  25: Patient states he had R L3-5 minimally invasive laminectomy bilateral

## 2025-07-28 ENCOUNTER — HOSPITAL ENCOUNTER (OUTPATIENT)
Dept: PHYSICAL THERAPY | Age: 75
Setting detail: THERAPIES SERIES
Discharge: HOME | End: 2025-07-28
Payer: MEDICARE

## 2025-07-28 PROCEDURE — 97116 GAIT TRAINING THERAPY: CPT

## 2025-07-28 PROCEDURE — 97112 NEUROMUSCULAR REEDUCATION: CPT

## 2025-07-28 PROCEDURE — 97110 THERAPEUTIC EXERCISES: CPT

## 2025-07-28 NOTE — FLOWSHEET NOTE
[facility] - Outpatient Rehabilitation and Therapy: [address] [place] [address] office: [phone] fax: [phone]    Physical Therapy: TREATMENT/PROGRESS NOTE   Patient: Elfego Rodney Jr (75 y.o. male)   Examination Date: 2025   :  1950 MRN: [id number]   Visit #: 7   Approval dates:25 - 25  Approved visits:16   Insurance Allowable Auth Needed   MN [x]Yes    []No    Insurance: Payor: [facility] MEDICARE / Plan: [facility] MEDICARE COMPLETE / Product Type: *No Product type* /   Insurance ID: [id number] - (Medicare Managed)  Secondary Insurance (if applicable):    Treatment Diagnosis:     ICD-10-CM    1. Gait difficulty  R26.9       2. Impairment of balance  R26.89          Medical Diagnosis:  Type 2 diabetes mellitus with diabetic peripheral angiopathy without gangrene (HCC) [E11.51]  Ataxia, unspecified [R27.0]   Referring Physician: Rashaad Huber MD  PCP: Rashaad Huber MD   Plan of care signed (Y/N): Y    Date of Patient follow up with Physician: PRN     Plan of Care Report: NO  POC update due: (10 visits /OR AUTH LIMITS, whichever is less)  2025                                             Medical History:  Comorbidities:  Cancer- skin  Diabetes (Type I or II)  Hypertension  TIA  Atrial Fibrillation  Relevant Medical History: see chart                                          Precautions/ Contra-indications:           Latex allergy:  NO  Pacemaker:    NO  Contraindications for Manipulation: NA    Red Flags:  None    Suicide Screening:   The patient did not verbalize a primary behavioral concern, suicidal ideation, suicidal intent, or demonstrate suicidal behaviors.    Preferred Language for Healthcare:   [x] English       [] other:    SUBJECTIVE EXAMINATION     Patient stated complaint/comment: Not too sore after last visit. Feels he only has soreness after walking a lot or when sleeping.     From eval  25: Patient states he had R L3-5 minimally

## 2025-07-30 ENCOUNTER — HOSPITAL ENCOUNTER (OUTPATIENT)
Dept: PHYSICAL THERAPY | Age: 75
Setting detail: THERAPIES SERIES
Discharge: HOME | End: 2025-07-30
Payer: MEDICARE

## 2025-07-30 PROCEDURE — 97110 THERAPEUTIC EXERCISES: CPT

## 2025-07-30 PROCEDURE — 97116 GAIT TRAINING THERAPY: CPT

## 2025-07-30 NOTE — FLOWSHEET NOTE
[facility] - Outpatient Rehabilitation and Therapy: [address] [place] [address] office: [phone] fax: [phone]    Physical Therapy: TREATMENT/PROGRESS NOTE   Patient: Elfego Rodney Jr (75 y.o. male)   Examination Date: 2025   :  1950 MRN: [id number]   Visit #: 8   Approval dates: 25 - 25  Approved visits: 16   Insurance Allowable Auth Needed   MN [x]Yes    []No    Insurance: Payor: [facility] MEDICARE / Plan: [facility] MEDICARE COMPLETE / Product Type: *No Product type* /   Insurance ID: [id number] - (Medicare Managed)  Secondary Insurance (if applicable):    Treatment Diagnosis:     ICD-10-CM    1. Gait difficulty  R26.9       2. Impairment of balance  R26.89          Medical Diagnosis:  Type 2 diabetes mellitus with diabetic peripheral angiopathy without gangrene (HCC) [E11.51]  Ataxia, unspecified [R27.0]   Referring Physician: Rashaad Huber MD  PCP: Rashaad Huber MD   Plan of care signed (Y/N): Y    Date of Patient follow up with Physician: PRN     Plan of Care Report: NO  POC update due: (10 visits /OR AUTH LIMITS, whichever is less)  2025                                             Medical History:  Comorbidities:  Cancer- skin  Diabetes (Type I or II)  Hypertension  TIA  Atrial Fibrillation  Relevant Medical History: see chart                                          Precautions/ Contra-indications:           Latex allergy:  NO  Pacemaker:    NO  Contraindications for Manipulation: NA    Red Flags:  None    Suicide Screening:   The patient did not verbalize a primary behavioral concern, suicidal ideation, suicidal intent, or demonstrate suicidal behaviors.    Preferred Language for Healthcare:   [x] English       [] other:    SUBJECTIVE EXAMINATION     Patient stated complaint/comment: Felt fine after last visit. Went for a walk. Yesterday was walking through yard, and had a hard time getting his RLE to work properly. Felt like his RLE was

## 2025-08-04 ENCOUNTER — HOSPITAL ENCOUNTER (OUTPATIENT)
Dept: PHYSICAL THERAPY | Age: 75
Setting detail: THERAPIES SERIES
Discharge: HOME | End: 2025-08-04
Payer: MEDICARE

## 2025-08-04 PROCEDURE — 97530 THERAPEUTIC ACTIVITIES: CPT

## 2025-08-06 ENCOUNTER — HOSPITAL ENCOUNTER (OUTPATIENT)
Dept: PHYSICAL THERAPY | Age: 75
Setting detail: THERAPIES SERIES
Discharge: HOME | End: 2025-08-06
Payer: MEDICARE

## 2025-08-06 PROCEDURE — 97110 THERAPEUTIC EXERCISES: CPT

## 2025-08-06 PROCEDURE — 97116 GAIT TRAINING THERAPY: CPT

## 2025-08-11 ENCOUNTER — HOSPITAL ENCOUNTER (OUTPATIENT)
Dept: PHYSICAL THERAPY | Age: 75
Setting detail: THERAPIES SERIES
Discharge: HOME | End: 2025-08-11
Payer: MEDICARE

## 2025-08-11 PROCEDURE — 97110 THERAPEUTIC EXERCISES: CPT

## 2025-08-11 PROCEDURE — 97116 GAIT TRAINING THERAPY: CPT

## 2025-08-13 ENCOUNTER — HOSPITAL ENCOUNTER (OUTPATIENT)
Dept: PHYSICAL THERAPY | Age: 75
Setting detail: THERAPIES SERIES
Discharge: HOME | End: 2025-08-13
Payer: MEDICARE

## 2025-08-13 PROCEDURE — 97116 GAIT TRAINING THERAPY: CPT

## 2025-08-13 PROCEDURE — 97110 THERAPEUTIC EXERCISES: CPT

## 2025-08-13 PROCEDURE — 97112 NEUROMUSCULAR REEDUCATION: CPT

## 2025-08-18 ENCOUNTER — HOSPITAL ENCOUNTER (OUTPATIENT)
Dept: PHYSICAL THERAPY | Age: 75
Setting detail: THERAPIES SERIES
Discharge: HOME | End: 2025-08-18
Payer: MEDICARE

## 2025-08-18 PROCEDURE — 97116 GAIT TRAINING THERAPY: CPT

## 2025-08-18 PROCEDURE — 97110 THERAPEUTIC EXERCISES: CPT

## 2025-08-21 ENCOUNTER — HOSPITAL ENCOUNTER (OUTPATIENT)
Dept: PHYSICAL THERAPY | Age: 75
Setting detail: THERAPIES SERIES
Discharge: HOME | End: 2025-08-21
Payer: MEDICARE

## 2025-08-21 PROCEDURE — 97116 GAIT TRAINING THERAPY: CPT

## 2025-08-21 PROCEDURE — 97112 NEUROMUSCULAR REEDUCATION: CPT

## 2025-08-21 PROCEDURE — 97110 THERAPEUTIC EXERCISES: CPT

## 2025-08-25 ENCOUNTER — HOSPITAL ENCOUNTER (OUTPATIENT)
Dept: PHYSICAL THERAPY | Age: 75
Setting detail: THERAPIES SERIES
Discharge: HOME | End: 2025-08-25
Payer: MEDICARE

## 2025-08-25 PROCEDURE — 97530 THERAPEUTIC ACTIVITIES: CPT

## 2025-08-27 ENCOUNTER — HOSPITAL ENCOUNTER (OUTPATIENT)
Dept: PHYSICAL THERAPY | Age: 75
Setting detail: THERAPIES SERIES
Discharge: HOME | End: 2025-08-27
Payer: MEDICARE

## 2025-08-27 PROCEDURE — 97116 GAIT TRAINING THERAPY: CPT

## 2025-08-27 PROCEDURE — 97110 THERAPEUTIC EXERCISES: CPT

## 2025-08-27 PROCEDURE — 97112 NEUROMUSCULAR REEDUCATION: CPT

## 2025-09-03 ENCOUNTER — HOSPITAL ENCOUNTER (OUTPATIENT)
Dept: PHYSICAL THERAPY | Age: 75
Setting detail: THERAPIES SERIES
Discharge: HOME | End: 2025-09-03
Payer: MEDICARE

## 2025-09-03 PROCEDURE — 97112 NEUROMUSCULAR REEDUCATION: CPT

## 2025-09-03 PROCEDURE — 97110 THERAPEUTIC EXERCISES: CPT
